# Patient Record
Sex: FEMALE | Race: BLACK OR AFRICAN AMERICAN | NOT HISPANIC OR LATINO | Employment: OTHER | URBAN - METROPOLITAN AREA
[De-identification: names, ages, dates, MRNs, and addresses within clinical notes are randomized per-mention and may not be internally consistent; named-entity substitution may affect disease eponyms.]

---

## 2018-06-21 DIAGNOSIS — H02.401 PTOSIS OF EYELID, RIGHT: Primary | ICD-10-CM

## 2018-08-14 ENCOUNTER — TELEPHONE (OUTPATIENT)
Dept: SPINE | Facility: CLINIC | Age: 73
End: 2018-08-14

## 2018-08-14 NOTE — TELEPHONE ENCOUNTER
Offered sooner appt on 8/22 at 4p with Dr. Rae; declined. Patient would prefer to keep her appt on Friday.

## 2018-08-23 ENCOUNTER — TELEPHONE (OUTPATIENT)
Dept: SPINE | Facility: CLINIC | Age: 73
End: 2018-08-23

## 2018-08-23 NOTE — PROGRESS NOTES
Subjective:      Patient ID: Letty Kruegre is a 73 y.o. female.    Chief Complaint: Low-back Pain    Ms Krueger is a 74 yo female here for evaluation of low back pain.  She has had low back pain since sept 2017.  She has had back pain in the past, for the past 20 years and then the pain went away.  She then feels like the pain hit her hard.  The pain is the worst in the right thigh.  The pain in back and goes around the leg to the front the thigh to the front of the ankle there is no foot pain.  She does have left leg pain as well.  The right leg pain is intermittent.  There is no relief with sitting.  There is pain with standing and walking. There is pain with bending and stooping.  She feels like she has to hold something when she is walking.  She does not think that bending makes pain better with walking, but does lean on grocery cart.  The left leg pain is also the front of the left leg.  The pain is a stabbing pain.  There is no tingling or numbness.  She has not been to PT, she has not been to chiropractor.  She did try acupuncture with no relief.  She is taking gabapentin 6 pills a day.  She takes them as needed.  She does not feel like help.  She does not take nsaids due to liver problems according to PCP.  She is unable to bend down and put a sock on.  She cannot get her foot up. She went to [facility] ortho clinic and had a local injections    MRI lumbar 7/2018  anterolisthesisL4 to L5 and L3 to L4  Central stenosis at L3-4 and L4-5 and to lesser extent L1-2 And L2-3  Annular disc bulge T12-L1, L1-2, L2-3 and left paracentral disc herniation l5-S1    Past Medical History:  No date: Arthritis  No date: Hypertension    History reviewed. No pertinent surgical history.    Review of patient's family history indicates:  Problem: Diabetes      Relation: Mother          Age of Onset: (Not Specified)  Problem: Diabetes      Relation: Father          Age of Onset: (Not Specified)      Social History    Socioeconomic  History      Marital status: Single      Spouse name: None      Number of children: None      Years of education: None      Highest education level: None    Social Needs      Financial resource strain: None      Food insecurity - worry: None      Food insecurity - inability: None      Transportation needs - medical: None      Transportation needs - non-medical: None    Occupational History      None    Tobacco Use      Smoking status: Never Smoker    Substance and Sexual Activity      Alcohol use: Yes        Frequency: Monthly or less        Drinks per session: 1 or 2        Binge frequency: Never      Drug use: No      Sexual activity: Not Currently        Partners: Male    Other Topics      Concerns:        None    Social History Narrative      None      Current Outpatient Medications:  gabapentin (NEURONTIN) 400 MG capsule, gabapentin 400 mg capsule, Disp: , Rfl:   lisinopril-hydrochlorothiazide (PRINZIDE,ZESTORETIC) 20-25 mg Tab, lisinopril 20 mg-hydrochlorothiazide 25 mg tablet, Disp: , Rfl:   lisinopril-hydrochlorothiazide (PRINZIDE,ZESTORETIC) 20-25 mg Tab, , Disp: , Rfl:   rosuvastatin (CRESTOR) 10 MG tablet, , Disp: , Rfl:     No current facility-administered medications for this visit.       Review of patient's allergies indicates:  No Known Allergies          Review of Systems   Constitution: Negative for weight gain and weight loss.   Cardiovascular: Negative for chest pain.   Respiratory: Negative for shortness of breath.    Musculoskeletal: Positive for back pain (bilateral leg pain). Negative for joint pain and joint swelling.   Gastrointestinal: Negative for abdominal pain and bowel incontinence.   Genitourinary: Negative for bladder incontinence.   Neurological: Negative for numbness.         Objective:        General: Letty LUCIA is well-developed, well-nourished, appears stated age, in no acute distress, alert and oriented to time, place and person.     General    Vitals reviewed.  Constitutional:  She is oriented to person, place, and time. She appears well-developed and well-nourished.   HENT:   Head: Normocephalic and atraumatic.   Pulmonary/Chest: Effort normal.   Neurological: She is alert and oriented to person, place, and time.   Psychiatric: She has a normal mood and affect. Her behavior is normal. Judgment and thought content normal.     General Musculoskeletal Exam   Gait: normal     Right Ankle/Foot Exam     Tests   Heel Walk: able to perform  Tiptoe Walk: able to perform    Left Ankle/Foot Exam     Tests   Heel Walk: able to perform  Tiptoe Walk: able to perform      Right Hip Exam     Range of Motion   External rotation: 50   Internal rotation: 5     Tests   Pain w/ forced internal rotation (KELBY): absent  Left Hip Exam     Range of Motion   External rotation: 60   Internal rotation: 10     Tests   Pain w/ forced internal rotation (KELBY): absent      Back (L-Spine & T-Spine) / Neck (C-Spine) Exam     Tenderness Right paramedian tenderness of the Sacrum.     Back (L-Spine & T-Spine) Range of Motion   Extension: 10   Flexion: 80   Lateral bend right: 10   Lateral bend left: 10   Rotation right: 20   Rotation left: 20     Spinal Sensation   Right Side Sensation  C-Spine Level: normal   L-Spine Level: normal  S-Spine Level: normal  Left Side Sensation  C-Spine Level: normal  L-Spine Level: normal  S-Spine Level: normal    Back (L-Spine & T-Spine) Tests   Right Side Tests  Straight leg raise:      Sitting SLR: > 70 degrees      Left Side Tests  Straight leg raise:     Sitting SLR: > 70 degrees          Other She has no scoliosis .  Spinal Kyphosis:  Absent      Muscle Strength   Right Upper Extremity   Biceps: 5/5/5   Deltoid:  5/5  Triceps:  5/5  Wrist extension: 5/5/5   Finger Flexors:  5/5  Left Upper Extremity  Biceps: 5/5/5   Deltoid:  5/5  Triceps:  5/5  Wrist extension: 5/5/5   Finger Flexors:  5/5  Right Lower Extremity   Hip Flexion: 5/5   Quadriceps:  5/5   Anterior tibial:  5/5/5  EHL:   5/5  Left Lower Extremity   Hip Flexion: 5/5   Quadriceps:  5/5   Anterior tibial:  5/5/5   EHL:  5/5    Reflexes     Left Side  Biceps:  2+  Triceps:  2+  Brachioradialis:  2+  Quadriceps:  2+  Achilles:  2+  Left Jennings's Sign:  Absent  Babinski Sign:  absent    Right Side   Biceps:  2+  Triceps:  2+  Brachioradialis:  2+  Quadriceps:  2+  Achilles:  2+  Right Jennings's Sign:  absent  Babinski Sign:  absent    Vascular Exam     Right Pulses        Carotid:                  2+    Left Pulses        Carotid:                  2+              Assessment:       1. Pain of both hip joints    2. Osteoarthritis of spine with radiculopathy, lumbar region    3. Spinal stenosis of lumbar region with neurogenic claudication    4. Chronic bilateral low back pain with bilateral sciatica           Plan:       Orders Placed This Encounter    Procedure Order to [facility]    X-Ray Hips Bilateral 2 View Inc AP Pelvis    X-Ray Lumbar Complete With Flex And Ext    Ambulatory Referral to Physical/Occupational Therapy     More than 50% of the total time of 45 minutes was spent in counseling on diagnosis and treatment options.  Outside records were reviewed.  We discussed MRI and the arthritis in her back causing spinal stenosis.  There is some anterolisthesis.  She has significant limitation in hip ROM, right greater than left.  The right thigh pain is recreated from moving right hip.  We discussed back and hip pain can cause similar symptoms.  We discussed back pain and the nature of back pain.  We discussed that it is not one thing that causes the pain but an accumulation of multiple things that we do.   We discussed the benefits of therapy and exercise and continuing to move.  1.  X-ray of the lumbar spine to look at the anterolisthesis  2.  X-ray of the hip to look at for DJD  3.  Pain management bilateral L4 TF TREVON  4.  PT for back and hip ROM, core strengthening, flexion exercises and [place] [facility] [place]  5.  We did discuss hip injection  6.  RTC 10 weeks        Follow-up: Follow-up in about 10 weeks (around 11/2/2018). If there are any questions prior to this, the patient was instructed to contact the office.

## 2018-08-24 ENCOUNTER — HOSPITAL ENCOUNTER (OUTPATIENT)
Dept: RADIOLOGY | Facility: OTHER | Age: 73
Discharge: HOME | End: 2018-08-24
Attending: PHYSICAL MEDICINE & REHABILITATION
Payer: MEDICARE

## 2018-08-24 ENCOUNTER — OFFICE VISIT (OUTPATIENT)
Dept: SPINE | Facility: CLINIC | Age: 73
End: 2018-08-24
Attending: PHYSICAL MEDICINE & REHABILITATION
Payer: MEDICARE

## 2018-08-24 VITALS
BODY MASS INDEX: 31.89 KG/M2 | HEART RATE: 75 BPM | DIASTOLIC BLOOD PRESSURE: 73 MMHG | HEIGHT: 63 IN | WEIGHT: 180 LBS | SYSTOLIC BLOOD PRESSURE: 149 MMHG

## 2018-08-24 DIAGNOSIS — M25.552 PAIN OF BOTH HIP JOINTS: ICD-10-CM

## 2018-08-24 DIAGNOSIS — M25.551 PAIN OF BOTH HIP JOINTS: Primary | ICD-10-CM

## 2018-08-24 DIAGNOSIS — G89.29 CHRONIC BILATERAL LOW BACK PAIN WITH BILATERAL SCIATICA: ICD-10-CM

## 2018-08-24 DIAGNOSIS — M25.551 PAIN OF BOTH HIP JOINTS: ICD-10-CM

## 2018-08-24 DIAGNOSIS — M54.41 CHRONIC BILATERAL LOW BACK PAIN WITH BILATERAL SCIATICA: ICD-10-CM

## 2018-08-24 DIAGNOSIS — M54.42 CHRONIC BILATERAL LOW BACK PAIN WITH BILATERAL SCIATICA: ICD-10-CM

## 2018-08-24 DIAGNOSIS — M47.26 OSTEOARTHRITIS OF SPINE WITH RADICULOPATHY, LUMBAR REGION: ICD-10-CM

## 2018-08-24 DIAGNOSIS — M25.552 PAIN OF BOTH HIP JOINTS: Primary | ICD-10-CM

## 2018-08-24 DIAGNOSIS — M48.062 SPINAL STENOSIS OF LUMBAR REGION WITH NEUROGENIC CLAUDICATION: ICD-10-CM

## 2018-08-24 PROCEDURE — 73521 X-RAY EXAM HIPS BI 2 VIEWS: CPT | Mod: TC,FY

## 2018-08-24 PROCEDURE — 99204 OFFICE O/P NEW MOD 45 MIN: CPT | Mod: S$GLB,,, | Performed by: PHYSICAL MEDICINE & REHABILITATION

## 2018-08-24 PROCEDURE — 72110 X-RAY EXAM L-2 SPINE 4/>VWS: CPT | Mod: 26,,, | Performed by: INTERNAL MEDICINE

## 2018-08-24 PROCEDURE — 73521 X-RAY EXAM HIPS BI 2 VIEWS: CPT | Mod: 26,,, | Performed by: INTERNAL MEDICINE

## 2018-08-24 PROCEDURE — 72110 X-RAY EXAM L-2 SPINE 4/>VWS: CPT | Mod: TC,FY

## 2018-08-24 PROCEDURE — 99999 PR PBB SHADOW E&M-EST. PATIENT-LVL IV: CPT | Mod: PBBFAC,,, | Performed by: PHYSICAL MEDICINE & REHABILITATION

## 2018-08-24 RX ORDER — ROSUVASTATIN CALCIUM 10 MG/1
10 TABLET, COATED ORAL DAILY
COMMUNITY
Start: 2018-07-27

## 2018-08-24 RX ORDER — GABAPENTIN 400 MG/1
CAPSULE ORAL
COMMUNITY
End: 2019-09-30

## 2018-08-24 RX ORDER — LISINOPRIL AND HYDROCHLOROTHIAZIDE 20; 25 MG/1; MG/1
TABLET ORAL
COMMUNITY
Start: 2018-07-27 | End: 2019-02-20

## 2018-08-24 RX ORDER — LISINOPRIL AND HYDROCHLOROTHIAZIDE 20; 25 MG/1; MG/1
TABLET ORAL
COMMUNITY
End: 2022-05-04 | Stop reason: ALTCHOICE

## 2018-08-24 NOTE — LETTER
August 24, 2018      Sparkle Chou MD  [address]  [place] [address]           [facility]  [address], [address]  [place] [address]  Phone: [phone]  Fax: [phone]          Patient: Letty Krueger   MR Number: [id number]   YOB: 1945   Date of Visit: 8/24/2018       Dear Dr. Sparkle Chou:    Thank you for referring Letty Krueger to me for evaluation. Attached you will find relevant portions of my assessment and plan of care.    If you have questions, please do not hesitate to call me. I look forward to following Letty Krueger along with you.    Sincerely,    Ene Rae MD    Enclosure  CC:  No Recipients    If you would like to receive this communication electronically, please contact [email or web address] or [phone] to request more information on dineout Link access.    For providers and/or their staff who would like to refer a patient to [facility], please contact us through our one-stop-shop provider referral line, [facility], at [phone].    If you feel you have received this communication in error or would no longer like to receive these types of communications, please e-mail [email or web address]

## 2018-08-27 ENCOUNTER — TELEPHONE (OUTPATIENT)
Dept: SPINE | Facility: CLINIC | Age: 73
End: 2018-08-27

## 2018-08-27 ENCOUNTER — TELEPHONE (OUTPATIENT)
Dept: PAIN MEDICINE | Facility: CLINIC | Age: 73
End: 2018-08-27

## 2018-08-27 NOTE — TELEPHONE ENCOUNTER
----- Message from Geoffrey Ledesma sent at 8/27/2018 10:17 AM CDT -----  Contact: Letty Krueger            Name of Who is Calling: Letty Krueger      What is the request in detail: Patient called in regards to injection and has additional questions      Can the clinic reply by [facility]: No      What [facility] Back [facility]: [phone]

## 2018-08-27 NOTE — TELEPHONE ENCOUNTER
Called and left message.  She has arthritis in her lower back and her right hip.  An injection for lower back ordered.  She might need a right hip injection as well

## 2018-08-27 NOTE — TELEPHONE ENCOUNTER
Patient was contacted staff left a message for patient to contact the office regarding her questions she had about her procedure

## 2018-08-28 DIAGNOSIS — M48.062 SPINAL STENOSIS, LUMBAR REGION, WITH NEUROGENIC CLAUDICATION: Primary | ICD-10-CM

## 2018-08-29 ENCOUNTER — TELEPHONE (OUTPATIENT)
Dept: SPINE | Facility: CLINIC | Age: 73
End: 2018-08-29

## 2018-08-29 NOTE — TELEPHONE ENCOUNTER
Patient was contacted as per patient she stated that she was informed that [facility] contacted her and told her she would have to pay 140.00 before her injection she stated that she contacted [facility] and was informed that she does not have to pay 140.00 and she was making sure that she didn't have to pay. Patient also stated that she spoke with [facility] regarding this matter

## 2018-08-29 NOTE — TELEPHONE ENCOUNTER
----- Message from Elle Alicea sent at 8/29/2018 11:28 AM CDT -----  Name of Who is Calling: CHAPARRO HALL [[id number]]      What is the request in detail: Pt would like to speak with the nurse for the code for her upcoming procedure.      Can the clinic reply by [facility]:    No       What Number to Call Back if not in [facility]: [phone]

## 2018-09-06 ENCOUNTER — HOSPITAL ENCOUNTER (OUTPATIENT)
Facility: OTHER | Age: 73
Discharge: HOME | End: 2018-09-06
Attending: ANESTHESIOLOGY | Admitting: ANESTHESIOLOGY
Payer: MEDICARE

## 2018-09-06 VITALS
OXYGEN SATURATION: 100 % | RESPIRATION RATE: 18 BRPM | SYSTOLIC BLOOD PRESSURE: 143 MMHG | HEART RATE: 63 BPM | DIASTOLIC BLOOD PRESSURE: 65 MMHG

## 2018-09-06 DIAGNOSIS — M51.36 DDD (DEGENERATIVE DISC DISEASE), LUMBAR: Primary | ICD-10-CM

## 2018-09-06 DIAGNOSIS — M48.062 SPINAL STENOSIS OF LUMBAR REGION WITH NEUROGENIC CLAUDICATION: ICD-10-CM

## 2018-09-06 PROBLEM — M51.369 DDD (DEGENERATIVE DISC DISEASE), LUMBAR: Status: ACTIVE | Noted: 2018-09-06

## 2018-09-06 PROCEDURE — 25000003 PHARM REV CODE 250: Performed by: ANESTHESIOLOGY

## 2018-09-06 PROCEDURE — 25000003 PHARM REV CODE 250: Performed by: GENERAL PRACTICE

## 2018-09-06 PROCEDURE — 64483 NJX AA&/STRD TFRM EPI L/S 1: CPT | Mod: 50,,, | Performed by: ANESTHESIOLOGY

## 2018-09-06 PROCEDURE — 25500020 PHARM REV CODE 255: Performed by: ANESTHESIOLOGY

## 2018-09-06 PROCEDURE — 64483 NJX AA&/STRD TFRM EPI L/S 1: CPT | Mod: 50 | Performed by: ANESTHESIOLOGY

## 2018-09-06 PROCEDURE — 63600175 PHARM REV CODE 636 W HCPCS: Performed by: ANESTHESIOLOGY

## 2018-09-06 PROCEDURE — 99152 MOD SED SAME PHYS/QHP 5/>YRS: CPT | Mod: ,,, | Performed by: ANESTHESIOLOGY

## 2018-09-06 RX ORDER — MIDAZOLAM HYDROCHLORIDE 1 MG/ML
INJECTION INTRAMUSCULAR; INTRAVENOUS
Status: DISCONTINUED | OUTPATIENT
Start: 2018-09-06 | End: 2018-09-06 | Stop reason: HOSPADM

## 2018-09-06 RX ORDER — LIDOCAINE HYDROCHLORIDE 10 MG/ML
INJECTION, SOLUTION EPIDURAL; INFILTRATION; INTRACAUDAL; PERINEURAL
Status: DISCONTINUED | OUTPATIENT
Start: 2018-09-06 | End: 2018-09-06 | Stop reason: HOSPADM

## 2018-09-06 RX ORDER — LIDOCAINE HYDROCHLORIDE 10 MG/ML
INJECTION INFILTRATION; PERINEURAL
Status: DISCONTINUED | OUTPATIENT
Start: 2018-09-06 | End: 2018-09-06 | Stop reason: HOSPADM

## 2018-09-06 RX ORDER — FENTANYL CITRATE 50 UG/ML
INJECTION, SOLUTION INTRAMUSCULAR; INTRAVENOUS
Status: DISCONTINUED | OUTPATIENT
Start: 2018-09-06 | End: 2018-09-06 | Stop reason: HOSPADM

## 2018-09-06 RX ORDER — SODIUM CHLORIDE 9 MG/ML
500 INJECTION, SOLUTION INTRAVENOUS CONTINUOUS
Status: DISCONTINUED | OUTPATIENT
Start: 2018-09-06 | End: 2018-09-06 | Stop reason: HOSPADM

## 2018-09-06 RX ORDER — DEXAMETHASONE SODIUM PHOSPHATE 100 MG/10ML
INJECTION INTRAMUSCULAR; INTRAVENOUS
Status: DISCONTINUED | OUTPATIENT
Start: 2018-09-06 | End: 2018-09-06 | Stop reason: HOSPADM

## 2018-09-06 RX ADMIN — SODIUM CHLORIDE 500 ML: 0.9 INJECTION, SOLUTION INTRAVENOUS at 11:09

## 2018-09-06 NOTE — DISCHARGE SUMMARY
Discharge Diagnosis:Spinal stenosis, lumbar region, with neurogenic claudication [M48.062]  Condition on Discharge: Stable.  Diet on Discharge: Same as before.  Activity: as per instruction sheet.  Discharge to: Home with a responsible adult.  Follow up: 2-4 weeks &/or as per Discharge instructions

## 2018-09-06 NOTE — PROGRESS NOTES
HPI  Patient presents for scheduled for bilateral L4 TFESI. She is otherwise without complaints currently other than her pain.      PMHx, PSHx, Allergies, Medications reviewed in epic     ROS negative except pain complaints in HPI    Objective:    GEN: No apparent distress. Affect normal.   HEENT: Normocephalic, Atraumatic; EOM intact  CV: regular rate and rhythm  RESP: clear to auscultation bilaterally; no increased work of breathing  ABD: soft, non-tender, non-distended, normal bowel sounds  SKIN: intact, No rashes    Plan:     Proceed with Bilateral L4 TFESI procedure as planned     Loco Causey MD  [employer] PM&R PGY-2

## 2018-09-06 NOTE — DISCHARGE INSTRUCTIONS
Thank you for allowing us to care for you today. You may receive a survey about the care we provided. Your feedback is valuable and helps us provide excellent care throughout the community.     Home Care Instructions for Pain Management:    1. DIET:   You may resume your normal diet today.   2. BATHING:   You may shower with luke warm water. No tub baths or anything that will soak injection sites under water for the next 24 hours.  3. DRESSING:   You may remove your bandage today.   4. ACTIVITY LEVEL:   You may resume your normal activities 24 hrs after your procedure. Nothing strenuous today.  5. MEDICATIONS:   You may resume your normal medications today. To restart blood thinners, ask your doctor.  6. DRIVING    If you have received any sedatives by mouth today, you may not drive for 12 hours.    If you have received any sedation through your IV, you may not drive for 24 hrs.   7. SPECIAL INSTRUCTIONS:   No heat to the injection site for 24 hrs including, hot bath or shower, heating pad, moist heat, or hot tubs.    Use ice pack to injection site for any pain or discomfort.  Apply ice packs for 20 minute intervals as needed.    IF you have diabetes, be sure to monitor your blood sugar more closely. IF your injection contained steroids your blood sugar levels may become higher than normal.    If you are still having pain upon discharge:  Your pain may improve over the next 48 hours. The anesthetic (numbing medication) works immediately to 48 hours. IF your injection contained a steroid (anti-inflammatory medication), it takes approximately 3 days to start feeling relief and 7-10 days to see your greatest results from the medication. It is possible you may need subsequent injections. This would be discussed at your follow up appointment with pain management or your referring doctor.      PLEASE CALL YOUR DOCTOR IF:  1. Redness or swelling around the injection site.  2. Fever of 101 degrees or more  3. Drainage  (pus) from the injection site.  4. For any continuous bleeding (some dried blood over the incision is normal.)    FOR EMERGENCIES:   If any unusual problems or difficulties occur during clinic hours, call [phone] or [phone]. Adult Procedural Sedation Instructions    Recovery After Procedural Sedation (Adult)  You have been given medicine by vein to make you sleep during your surgery. This may have included both a pain medicine and sleeping medicine. Most of the effects have worn off. But you may still have some drowsiness for the next 6 to 8 hours.  Home care  Follow these guidelines when you get home:  · For the next 8 hours, you should be watched by a responsible adult. This person should make sure your condition is not getting worse.  · Don't drink any alcohol for the next 24 hours.  · Don't drive, operate dangerous machinery, or make important business or personal decisions during the next 24 hours.  Note: Your healthcare provider may tell you not to take any medicine by mouth for pain or sleep in the next 4 hours. These medicines may react with the medicines you were given in the hospital. This could cause a much stronger response than usual.  Follow-up care  Follow up with your healthcare provider if you are not alert and back to your usual level of activity within 12 hours.  When to seek medical advice  Call your healthcare provider right away if any of these occur:  · Drowsiness gets worse  · Weakness or dizziness gets worse  · Repeated vomiting  · You can't be awakened   Date Last Reviewed: 10/18/2016  © 9221-9972 [employer]. [address] [place] [address], [place] [address]. All rights reserved. This information is not intended as a substitute for professional medical care. Always follow your healthcare professional's instructions.

## 2018-09-06 NOTE — OP NOTE
Procedure: Bilateral L4 TFESI    Date of Service: 09/06/2018    PCP: Bev Watt NP    Referring Physician:    Time-out taken to identify patient and procedure side prior to starting the procedure.   I attest that I have reviewed the patient's home medications prior to the procedure and no contraindication have been identified. I  re-evaluated the patient after the patient was positioned for the procedure in the procedure room immediately before the procedural time-out. The vital signs are current and represent the current state of the patient which has not significantly changed since the preprocedure assessment.                                                           PROCEDURE: Bilateral L4 transforaminal epidural steroid injection under fluoroscopy    REASON FOR PROCEDURE: Bilateral Spinal stenosis, lumbar region, with neurogenic claudication [M48.062]  1. DDD (degenerative disc disease), lumbar    2. Spinal stenosis of lumbar region with neurogenic claudication      POSTPROCEDURE DIAGNOSIS:   Spinal stenosis, lumbar region, with neurogenic claudication [M48.062]    1. DDD (degenerative disc disease), lumbar    2. Spinal stenosis of lumbar region with neurogenic claudication           PHYSICIAN: Terri Wolf MD  ASSISTANTS:Brianda Cook MD    MEDICATIONS INJECTED:  Preservative-free dexamethasone 10mg, Xylocaine 1% MPF 3-5ml. 3ml per level. Preservative free, sterile normal saline is used to get larger volume as needed.  LOCAL ANESTHETIC INJECTED:  Xylocaine 1% 9ml with Sodium Bicarbonate 1ml. 3ml per site.    SEDATION MEDICATIONS: 2 mg versed and 25 mg fentanyl IV  ESTIMATED BLOOD LOSS:  None.    COMPLICATIONS:  None.    TECHNIQUE:   Laying in a prone position, the patient was prepped and draped in the usual sterile fashion using ChloraPrep and fenestrated drape.  The area to be injected was determined under fluoroscopic guidance.  Local anesthetic was given by raising a wheel and going down to the hub of  a 27-gauge 1.25 inch needle.  The 3.5inch 22-gauge spinal needle was introduced towards the transverse process of each above named nerve root level.  The needle was walked medially then hinged into the neural foramen.  Omnipaque was injected to confirm appropriate placement and that there was no vascular runoff.  The medication was then injected after applying negative pressure. The patient tolerated the procedure well.    PAIN BEFORE THE PROCEDURE: 7/10.    PAIN AFTER THE PROCEDURE: 0/10.    The patient was monitored after the procedure.  Patient was given post procedure and discharge instructions to follow at home.  We will see the patient back in two weeks or the patient may call to inform of status. The patient was discharged in a stable condition.

## 2018-09-14 ENCOUNTER — TELEPHONE (OUTPATIENT)
Dept: SPINE | Facility: CLINIC | Age: 73
End: 2018-09-14

## 2018-09-14 NOTE — TELEPHONE ENCOUNTER
Patient was contacted and informed that she must give the injection 7-14 days to work the numbing medication has worn off and discomfort is normal however she must give the actual steroid time to absorb. Patient stated that she would give the injection more time

## 2018-09-14 NOTE — TELEPHONE ENCOUNTER
----- Message from Trinh Theodore sent at 9/14/2018 11:20 AM CDT -----  Contact: lashawn  Name of Who is Calling: lashawn      What is the request in detail: Patient states she has a steroid shot on last thursday she wanted to see if she can get another injection or get something stronger because it did not work       Can the clinic reply by [facility]: no      What Number to Call Back if not in [facility]: [phone]

## 2018-09-24 ENCOUNTER — TELEPHONE (OUTPATIENT)
Dept: SPINE | Facility: CLINIC | Age: 73
End: 2018-09-24

## 2018-09-24 DIAGNOSIS — M48.062 SPINAL STENOSIS OF LUMBAR REGION WITH NEUROGENIC CLAUDICATION: Primary | ICD-10-CM

## 2018-09-24 DIAGNOSIS — M16.11 PRIMARY OSTEOARTHRITIS OF RIGHT HIP: ICD-10-CM

## 2018-09-24 NOTE — TELEPHONE ENCOUNTER
SHe is unable to go to PT because of the cost.  She does not feel l tara the injection helped.  We discussed HIP djd. We discussed a hip injection vs another TREVON.  Will send to clinic to discuss injection options with Dr. Wolf

## 2018-09-24 NOTE — TELEPHONE ENCOUNTER
----- Message from Anisa Landon sent at 9/24/2018 11:07 AM CDT -----  Contact: Pt  Name of Who is Calling:CHAPARRO HALL [[id number]]    What is the request in detail: Patient would like a call back regarding a sterid shot still not working Please contact to further discuss and advise    Can the clinic reply by [facility]: No    [facility]: [phone]

## 2018-09-24 NOTE — TELEPHONE ENCOUNTER
Patient was contacted as per patient the pain has not gotten better and she gave the injection time she stated that she did not get any relief and would like to know what other options does she have

## 2018-09-25 ENCOUNTER — TELEPHONE (OUTPATIENT)
Dept: SPINE | Facility: CLINIC | Age: 73
End: 2018-09-25

## 2018-09-25 NOTE — TELEPHONE ENCOUNTER
Patient was contacted and scheduled an appt with Mirta BRUNO in Pain Mgmt. Patient had an injection with  on 09/06/18

## 2018-09-25 NOTE — TELEPHONE ENCOUNTER
----- Message from Chelsie White LPN sent at 9/24/2018  5:21 PM CDT -----  Contact: pt  Patient returning your call  ----- Message -----  From: Evita Youngblood  Sent: 9/24/2018   3:03 PM  To: Maryann Murray Staff              Name of Who is Calling: CHAPARRO HALL [[id number]]    What is the request in detail: pt returning call.. Please advise      Can the clinic reply by [facility]: no      What [facility] Back [facility]: [phone]

## 2018-09-26 ENCOUNTER — OFFICE VISIT (OUTPATIENT)
Dept: PAIN MEDICINE | Facility: CLINIC | Age: 73
End: 2018-09-26
Payer: MEDICARE

## 2018-09-26 VITALS
HEART RATE: 70 BPM | RESPIRATION RATE: 18 BRPM | WEIGHT: 187.38 LBS | TEMPERATURE: 98 F | HEIGHT: 63 IN | SYSTOLIC BLOOD PRESSURE: 122 MMHG | DIASTOLIC BLOOD PRESSURE: 65 MMHG | BODY MASS INDEX: 33.2 KG/M2

## 2018-09-26 DIAGNOSIS — M16.11 PRIMARY OSTEOARTHRITIS OF RIGHT HIP: ICD-10-CM

## 2018-09-26 DIAGNOSIS — M48.062 SPINAL STENOSIS OF LUMBAR REGION WITH NEUROGENIC CLAUDICATION: ICD-10-CM

## 2018-09-26 DIAGNOSIS — M54.16 LUMBAR RADICULOPATHY: Primary | ICD-10-CM

## 2018-09-26 DIAGNOSIS — M51.36 DDD (DEGENERATIVE DISC DISEASE), LUMBAR: ICD-10-CM

## 2018-09-26 DIAGNOSIS — M47.816 FACET ARTHRITIS OF LUMBAR REGION: ICD-10-CM

## 2018-09-26 PROCEDURE — 1101F PT FALLS ASSESS-DOCD LE1/YR: CPT | Mod: CPTII,,, | Performed by: NURSE PRACTITIONER

## 2018-09-26 PROCEDURE — 99213 OFFICE O/P EST LOW 20 MIN: CPT | Mod: PBBFAC | Performed by: NURSE PRACTITIONER

## 2018-09-26 PROCEDURE — 99213 OFFICE O/P EST LOW 20 MIN: CPT | Mod: S$PBB,,, | Performed by: NURSE PRACTITIONER

## 2018-09-26 PROCEDURE — 99999 PR PBB SHADOW E&M-EST. PATIENT-LVL III: CPT | Mod: PBBFAC,,, | Performed by: NURSE PRACTITIONER

## 2018-09-26 NOTE — PROGRESS NOTES
Chronic patient Established Note (Follow up visit)      SUBJECTIVE:    Letty Krueger presents to the clinic for a follow-up appointment for low back pain. She is s/p bilateral L4 TF TREVON on 9/6/2018 [facility]. She reports no significant relief of her pain. She continues to report low back pain that radiates down the anterior aspect of her legs to her knees. She describes this pain as tingling and shooting. Her pain is worse with prolonged activity and turning over in bed. She is currently taking Gabapentin with limited relief. She denies any other health changes Her pain today is 9/10.        Pain Medications:  Gabapentin    Opioid Contract: no     report:  Reviewed and consistent with medication use as prescribed.    Pain Procedures:   9/6/2018- Bilateral L4 TF TREVON    Physical Therapy/Home Exercise: no    Imaging:   Xray Lumbar Spine 8/24/2018:  FINDINGS:  There is osseous demineralization.  Vertebral body heights are maintained.  There is mild grade 1 anterolisthesis at L3-4 and L4-5.  This does not change significantly with flexion or extension.  There is mild osteophytic spurring at the thoracolumbar junction.  Moderate to advanced facet degenerative changes are present in the mid to lower lumbar spine.  Disc space narrowing present predominantly at L4-5 and L1-2.      Impression       Mild degenerative change     Xray Hips Bilateral 8/24/2018:  FINDINGS:  There is diffuse osseous demineralization.  There are degenerative changes in the lower lumbar spine.  There is severe narrowing at the right hip joint, with some sub chondral sclerosis and cyst formation and mild osteophyte formation.  There is no fracture.      Impression       As above     Outside MRI in media    Allergies: Review of patient's allergies indicates:  No Known Allergies    Current Medications:   Current Outpatient Medications   Medication Sig Dispense Refill    gabapentin (NEURONTIN) 400 MG capsule gabapentin  400 mg capsule      lisinopril-hydrochlorothiazide (PRINZIDE,ZESTORETIC) 20-25 mg Tab lisinopril 20 mg-hydrochlorothiazide 25 mg tablet      lisinopril-hydrochlorothiazide (PRINZIDE,ZESTORETIC) 20-25 mg Tab       rosuvastatin (CRESTOR) 10 MG tablet        No current facility-administered medications for this visit.        REVIEW OF SYSTEMS:    GENERAL:  No weight loss, malaise or fevers.  HEENT:  Negative for frequent or significant headaches.  NECK:  Negative for lumps, goiter, pain and significant neck swelling.  RESPIRATORY:  Negative for cough, wheezing or shortness of breath.  CARDIOVASCULAR:  Negative for chest pain, leg swelling or palpitations. HTN  GI:  Negative for abdominal discomfort, blood in stools or black stools or change in bowel habits.  MUSCULOSKELETAL:  See HPI.  SKIN:  Negative for lesions, rash, and itching.  PSYCH:  Negative for sleep disturbance, mood disorder and recent psychosocial stressors.  HEMATOLOGY/LYMPHOLOGY:  Negative for prolonged bleeding, bruising easily or swollen nodes.  NEURO:   No history of headaches, syncope, paralysis, seizures or tremors.  All other reviewed and negative other than HPI.    Past Medical History:  Past Medical History:   Diagnosis Date    Arthritis     DDD (degenerative disc disease), lumbar 9/6/2018    Hypertension        Past Surgical History:  Past Surgical History:   Procedure Laterality Date    Injection,steroid,epidural,transforaminal approach  L4 Bilateral 9/6/2018    Performed by Terri Wolf MD at [facility]       Family History:  Family History   Problem Relation Age of Onset    Diabetes Mother     Diabetes Father        Social History:  Social History     Socioeconomic History    Marital status: Single     Spouse name: Not on file    Number of children: Not on file    Years of education: Not on file    Highest education level: Not on file   Social Needs    Financial resource strain: Not on file    Food insecurity - worry: Not on  "file    Food insecurity - inability: Not on file    Transportation needs - medical: Not on file    Transportation needs - non-medical: Not on file   Occupational History    Not on file   Tobacco Use    Smoking status: Never Smoker   Substance and Sexual Activity    Alcohol use: Yes     Frequency: Monthly or less     Drinks per session: 1 or 2     Binge frequency: Never    Drug use: No    Sexual activity: Not Currently     Partners: Male   Other Topics Concern    Not on file   Social History Narrative    Not on file       OBJECTIVE:    /65   Pulse 70   Temp 97.5 °F (36.4 °C) (Oral)   Resp 18   Ht 5' 3" (1.6 m)   Wt 85 kg (187 lb 6.3 oz)   BMI 33.19 kg/m²     PHYSICAL EXAMINATION:    General appearance: Well appearing, in no acute distress, alert and oriented x3.  Psych:  Mood and affect appropriate.  Skin: Skin color, texture, turgor normal, no rashes or lesions, in both upper and lower body.  Head/face:  Atraumatic, normocephalic. No palpable lymph nodes.  Cor: RRR  Pulm: CTA  GI: Abdomen soft and non-tender.  Back: Straight leg raising in the sitting position is negative to radicular pain. There is pain with palpation over lumbar spine. Limited ROM with pain on flexion and extension. Positive facet loading bilaterally.   Extremities: Peripheral joint ROM is full and pain free without obvious instability or laxity in all four extremities. No deformities, edema, or skin discoloration. Good capillary refill.  Musculoskeletal: Shoulder, hip, and knee provocative maneuvers are negative. There is pain with palpation over bilateral SI joints. FABERs is positive bilaterally. Bilateral lower extremity strength is normal and symmetric.  No atrophy or tone abnormalities are noted.  Neuro: Bilateral lower extremity coordination and muscle stretch reflexes are physiologic and symmetric.  Plantar response are downgoing. No loss of sensation is noted.  Gait: Antalgic- ambulates with cane.     ASSESSMENT: 73 " y.o. year old female with low back pain, consistent with the followin. Lumbar radiculopathy     2. Spinal stenosis of lumbar region with neurogenic claudication     3. DDD (degenerative disc disease), lumbar     4. Facet arthritis of lumbar region     5. Primary osteoarthritis of right hip           PLAN:     - Previous imaging was reviewed and discussed with the patient today.    - Schedule for bilateral L3 TF TREVON.  The procedure, risks, benefits and options were discussed with patient. There are no contraindications to the procedure. The patient expressed understanding and agreed to proceed.      - If limited relief, consider bilateral hip joint injections.     - Continue Gabapentin.     - I have stressed the importance of physical activity and a home exercise plan to help with pain and improve health.    - RTC 2 weeks after above procedure.     - Counseled patient regarding the importance of activity modification and constant sleeping habits.    - Dr. Wolf was consulted on the patient and agrees with this plan.    The above plan and management options were discussed at length with patient. Patient is in agreement with the above and verbalized understanding.    Mirta Jama NP  2018

## 2018-09-26 NOTE — H&P (VIEW-ONLY)
Chronic patient Established Note (Follow up visit)      SUBJECTIVE:    Letty Krueger presents to the clinic for a follow-up appointment for low back pain. She is s/p bilateral L4 TF TREVON on 9/6/2018 [facility]. She reports no significant relief of her pain. She continues to report low back pain that radiates down the anterior aspect of her legs to her knees. She describes this pain as tingling and shooting. Her pain is worse with prolonged activity and turning over in bed. She is currently taking Gabapentin with limited relief. She denies any other health changes Her pain today is 9/10.        Pain Medications:  Gabapentin    Opioid Contract: no     report:  Reviewed and consistent with medication use as prescribed.    Pain Procedures:   9/6/2018- Bilateral L4 TF TREVON    Physical Therapy/Home Exercise: no    Imaging:   Xray Lumbar Spine 8/24/2018:  FINDINGS:  There is osseous demineralization.  Vertebral body heights are maintained.  There is mild grade 1 anterolisthesis at L3-4 and L4-5.  This does not change significantly with flexion or extension.  There is mild osteophytic spurring at the thoracolumbar junction.  Moderate to advanced facet degenerative changes are present in the mid to lower lumbar spine.  Disc space narrowing present predominantly at L4-5 and L1-2.      Impression       Mild degenerative change     Xray Hips Bilateral 8/24/2018:  FINDINGS:  There is diffuse osseous demineralization.  There are degenerative changes in the lower lumbar spine.  There is severe narrowing at the right hip joint, with some sub chondral sclerosis and cyst formation and mild osteophyte formation.  There is no fracture.      Impression       As above     Outside MRI in media    Allergies: Review of patient's allergies indicates:  No Known Allergies    Current Medications:   Current Outpatient Medications   Medication Sig Dispense Refill    gabapentin (NEURONTIN) 400 MG capsule gabapentin  400 mg capsule      lisinopril-hydrochlorothiazide (PRINZIDE,ZESTORETIC) 20-25 mg Tab lisinopril 20 mg-hydrochlorothiazide 25 mg tablet      lisinopril-hydrochlorothiazide (PRINZIDE,ZESTORETIC) 20-25 mg Tab       rosuvastatin (CRESTOR) 10 MG tablet        No current facility-administered medications for this visit.        REVIEW OF SYSTEMS:    GENERAL:  No weight loss, malaise or fevers.  HEENT:  Negative for frequent or significant headaches.  NECK:  Negative for lumps, goiter, pain and significant neck swelling.  RESPIRATORY:  Negative for cough, wheezing or shortness of breath.  CARDIOVASCULAR:  Negative for chest pain, leg swelling or palpitations. HTN  GI:  Negative for abdominal discomfort, blood in stools or black stools or change in bowel habits.  MUSCULOSKELETAL:  See HPI.  SKIN:  Negative for lesions, rash, and itching.  PSYCH:  Negative for sleep disturbance, mood disorder and recent psychosocial stressors.  HEMATOLOGY/LYMPHOLOGY:  Negative for prolonged bleeding, bruising easily or swollen nodes.  NEURO:   No history of headaches, syncope, paralysis, seizures or tremors.  All other reviewed and negative other than HPI.    Past Medical History:  Past Medical History:   Diagnosis Date    Arthritis     DDD (degenerative disc disease), lumbar 9/6/2018    Hypertension        Past Surgical History:  Past Surgical History:   Procedure Laterality Date    Injection,steroid,epidural,transforaminal approach  L4 Bilateral 9/6/2018    Performed by Ter[facility] at [facility]       Family History:  Family History   Problem Relation Age of Onset    Diabetes Mother     Diabetes Father        Social History:  Social History     Socioeconomic History    Marital status: Single     Spouse name: Not on file    Number of children: Not on file    Years of education: Not on file    Highest education level: Not on file   Social Needs    Financial resource strain: Not on file    Food insecurity - worry: Not on  "file    Food insecurity - inability: Not on file    Transportation needs - medical: Not on file    Transportation needs - non-medical: Not on file   Occupational History    Not on file   Tobacco Use    Smoking status: Never Smoker   Substance and Sexual Activity    Alcohol use: Yes     Frequency: Monthly or less     Drinks per session: 1 or 2     Binge frequency: Never    Drug use: No    Sexual activity: Not Currently     Partners: Male   Other Topics Concern    Not on file   Social History Narrative    Not on file       OBJECTIVE:    /65   Pulse 70   Temp 97.5 °F (36.4 °C) (Oral)   Resp 18   Ht 5' 3" (1.6 m)   Wt 85 kg (187 lb 6.3 oz)   BMI 33.19 kg/m²     PHYSICAL EXAMINATION:    General appearance: Well appearing, in no acute distress, alert and oriented x3.  Psych:  Mood and affect appropriate.  Skin: Skin color, texture, turgor normal, no rashes or lesions, in both upper and lower body.  Head/face:  Atraumatic, normocephalic. No palpable lymph nodes.  Cor: RRR  Pulm: CTA  GI: Abdomen soft and non-tender.  Back: Straight leg raising in the sitting position is negative to radicular pain. There is pain with palpation over lumbar spine. Limited ROM with pain on flexion and extension. Positive facet loading bilaterally.   Extremities: Peripheral joint ROM is full and pain free without obvious instability or laxity in all four extremities. No deformities, edema, or skin discoloration. Good capillary refill.  Musculoskeletal: Shoulder, hip, and knee provocative maneuvers are negative. There is pain with palpation over bilateral SI joints. FABERs is positive bilaterally. Bilateral lower extremity strength is normal and symmetric.  No atrophy or tone abnormalities are noted.  Neuro: Bilateral lower extremity coordination and muscle stretch reflexes are physiologic and symmetric.  Plantar response are downgoing. No loss of sensation is noted.  Gait: Antalgic- ambulates with cane.     ASSESSMENT: 73 " y.o. year old female with low back pain, consistent with the followin. Lumbar radiculopathy     2. Spinal stenosis of lumbar region with neurogenic claudication     3. DDD (degenerative disc disease), lumbar     4. Facet arthritis of lumbar region     5. Primary osteoarthritis of right hip           PLAN:     - Previous imaging was reviewed and discussed with the patient today.    - Schedule for bilateral L3 TF TREVON.  The procedure, risks, benefits and options were discussed with patient. There are no contraindications to the procedure. The patient expressed understanding and agreed to proceed.      - If limited relief, consider bilateral hip joint injections.     - Continue Gabapentin.     - I have stressed the importance of physical activity and a home exercise plan to help with pain and improve health.    - RTC 2 weeks after above procedure.     - Counseled patient regarding the importance of activity modification and constant sleeping habits.    - Dr. Wolf was consulted on the patient and agrees with this plan.    The above plan and management options were discussed at length with patient. Patient is in agreement with the above and verbalized understanding.    Mirta Jama NP  2018

## 2018-10-01 ENCOUNTER — HOSPITAL ENCOUNTER (OUTPATIENT)
Facility: OTHER | Age: 73
Discharge: HOME | End: 2018-10-01
Attending: ANESTHESIOLOGY | Admitting: ANESTHESIOLOGY
Payer: MEDICARE

## 2018-10-01 VITALS
TEMPERATURE: 98 F | BODY MASS INDEX: 33.13 KG/M2 | SYSTOLIC BLOOD PRESSURE: 138 MMHG | WEIGHT: 187 LBS | HEART RATE: 58 BPM | DIASTOLIC BLOOD PRESSURE: 65 MMHG | OXYGEN SATURATION: 99 % | RESPIRATION RATE: 18 BRPM | HEIGHT: 63 IN

## 2018-10-01 DIAGNOSIS — M51.36 DDD (DEGENERATIVE DISC DISEASE), LUMBAR: Primary | ICD-10-CM

## 2018-10-01 DIAGNOSIS — G89.29 CHRONIC PAIN: ICD-10-CM

## 2018-10-01 PROCEDURE — 64483 NJX AA&/STRD TFRM EPI L/S 1: CPT | Mod: 50,,, | Performed by: ANESTHESIOLOGY

## 2018-10-01 PROCEDURE — 64483 NJX AA&/STRD TFRM EPI L/S 1: CPT | Mod: 50 | Performed by: ANESTHESIOLOGY

## 2018-10-01 PROCEDURE — 25000003 PHARM REV CODE 250: Performed by: ANESTHESIOLOGY

## 2018-10-01 PROCEDURE — 63600175 PHARM REV CODE 636 W HCPCS: Performed by: ANESTHESIOLOGY

## 2018-10-01 PROCEDURE — 25500020 PHARM REV CODE 255: Performed by: ANESTHESIOLOGY

## 2018-10-01 PROCEDURE — 99152 MOD SED SAME PHYS/QHP 5/>YRS: CPT | Mod: ,,, | Performed by: ANESTHESIOLOGY

## 2018-10-01 RX ORDER — LIDOCAINE HYDROCHLORIDE 10 MG/ML
INJECTION INFILTRATION; PERINEURAL
Status: DISCONTINUED | OUTPATIENT
Start: 2018-10-01 | End: 2018-10-01 | Stop reason: HOSPADM

## 2018-10-01 RX ORDER — LIDOCAINE HYDROCHLORIDE 10 MG/ML
INJECTION, SOLUTION EPIDURAL; INFILTRATION; INTRACAUDAL; PERINEURAL
Status: DISCONTINUED | OUTPATIENT
Start: 2018-10-01 | End: 2018-10-01 | Stop reason: HOSPADM

## 2018-10-01 RX ORDER — DEXAMETHASONE SODIUM PHOSPHATE 100 MG/10ML
INJECTION INTRAMUSCULAR; INTRAVENOUS
Status: DISCONTINUED | OUTPATIENT
Start: 2018-10-01 | End: 2018-10-01 | Stop reason: HOSPADM

## 2018-10-01 RX ORDER — FENTANYL CITRATE 50 UG/ML
INJECTION, SOLUTION INTRAMUSCULAR; INTRAVENOUS
Status: DISCONTINUED | OUTPATIENT
Start: 2018-10-01 | End: 2018-10-01 | Stop reason: HOSPADM

## 2018-10-01 RX ORDER — MIDAZOLAM HYDROCHLORIDE 1 MG/ML
INJECTION INTRAMUSCULAR; INTRAVENOUS
Status: DISCONTINUED | OUTPATIENT
Start: 2018-10-01 | End: 2018-10-01 | Stop reason: HOSPADM

## 2018-10-01 RX ORDER — SODIUM CHLORIDE 9 MG/ML
500 INJECTION, SOLUTION INTRAVENOUS CONTINUOUS
Status: DISCONTINUED | OUTPATIENT
Start: 2018-10-01 | End: 2018-10-01 | Stop reason: HOSPADM

## 2018-10-01 NOTE — DISCHARGE INSTRUCTIONS
Thank you for allowing us to care for you today. You may receive a survey about the care we provided. Your feedback is valuable and helps us provide excellent care throughout the community.     Home Care Instructions for Pain Management:    1. DIET:   You may resume your normal diet today.   2. BATHING:   You may shower with luke warm water. No tub baths or anything that will soak injection sites under water for the next 24 hours.  3. DRESSING:   You may remove your bandage today.   4. ACTIVITY LEVEL:   You may resume your normal activities 24 hrs after your procedure. Nothing strenuous today.  5. MEDICATIONS:   You may resume your normal medications today. To restart blood thinners, ask your doctor.  6. DRIVING    If you have received any sedatives by mouth today, you may not drive for 12 hours.    If you have received any sedation through your IV, you may not drive for 24 hrs.   7. SPECIAL INSTRUCTIONS:   No heat to the injection site for 24 hrs including, hot bath or shower, heating pad, moist heat, or hot tubs.    Use ice pack to injection site for any pain or discomfort.  Apply ice packs for 20 minute intervals as needed.    IF you have diabetes, be sure to monitor your blood sugar more closely. IF your injection contained steroids your blood sugar levels may become higher than normal.    If you are still having pain upon discharge:  Your pain may improve over the next 48 hours. The anesthetic (numbing medication) works immediately to 48 hours. IF your injection contained a steroid (anti-inflammatory medication), it takes approximately 3 days to start feeling relief and 7-10 days to see your greatest results from the medication. It is possible you may need subsequent injections. This would be discussed at your follow up appointment with pain management or your referring doctor.      PLEASE CALL YOUR DOCTOR IF:  1. Redness or swelling around the injection site.  2. Fever of 101 degrees or more  3. Drainage  (pus) from the injection site.  4. For any continuous bleeding (some dried blood over the incision is normal.)    FOR EMERGENCIES:   If any unusual problems or difficulties occur during clinic hours, call [phone] or [phone]. Adult Procedural Sedation Instructions    Recovery After Procedural Sedation (Adult)  You have been given medicine by vein to make you sleep during your surgery. This may have included both a pain medicine and sleeping medicine. Most of the effects have worn off. But you may still have some drowsiness for the next 6 to 8 hours.  Home care  Follow these guidelines when you get home:  · For the next 8 hours, you should be watched by a responsible adult. This person should make sure your condition is not getting worse.  · Don't drink any alcohol for the next 24 hours.  · Don't drive, operate dangerous machinery, or make important business or personal decisions during the next 24 hours.  Note: Your healthcare provider may tell you not to take any medicine by mouth for pain or sleep in the next 4 hours. These medicines may react with the medicines you were given in the hospital. This could cause a much stronger response than usual.  Follow-up care  Follow up with your healthcare provider if you are not alert and back to your usual level of activity within 12 hours.  When to seek medical advice  Call your healthcare provider right away if any of these occur:  · Drowsiness gets worse  · Weakness or dizziness gets worse  · Repeated vomiting  · You can't be awakened   Date Last Reviewed: 10/18/2016  © 8611-1339 [email or web address] [address] [place] [address], [place] [address]. All rights reserved. This information is not intended as a substitute for professional medical care. Always follow your healthcare professional's instructions.

## 2018-10-01 NOTE — INTERVAL H&P NOTE
"The patient has been examined and the H&P has been reviewed:    I concur with the findings and no changes have occurred since H&P was written.    Anesthesia/Surgery risks, benefits and alternative options discussed and understood by patient/family.      HPI  73 year old woman with a past medical history of HTN, lumbar radiculopathy    PMHx, PSHx, Allergies, Medications reviewed in epic    ROS negative except pain complaints in HPI    OBJECTIVE:    /62   Pulse 65   Temp 97.5 °F (36.4 °C) (Oral)   Resp 18   Ht 5' 3" (1.6 m)   Wt 84.8 kg (187 lb)   SpO2 95%   BMI 33.13 kg/m²     PHYSICAL EXAMINATION:    GENERAL: Well appearing, in no acute distress, alert and oriented x3.  PSYCH:  Mood and affect appropriate.  SKIN: Skin color, texture, turgor normal, no rashes or lesions.  CV: RRR with palpation of the radial artery.  PULM: No evidence of respiratory difficulty, symmetric chest rise. Clear to auscultation.  NEURO: Cranial nerves grossly intact.    Plan:    Proceed with procedure as planned epidural steroid injection    Ge Lara  10/01/2018      Active Hospital Problems    Diagnosis  POA    Chronic pain [G89.29]  Yes      Resolved Hospital Problems   No resolved problems to display.     "

## 2018-10-01 NOTE — DISCHARGE SUMMARY
Discharge Diagnosis:Lumbar radiculopathy [M54.16]  DDD (degenerative disc disease), lumbar [M51.36]  Condition on Discharge: Stable.  Diet on Discharge: Same as before.  Activity: as per instruction sheet.  Discharge to: Home with a responsible adult.  Follow up: 2-4 weeks &/or as per Discharge instructions

## 2018-10-01 NOTE — OP NOTE
Date of Service: 10/01/2018    PCP: Bev Watt NP    Referring Physician:    Time-out taken to identify patient and procedure side prior to starting the procedure.   I attest that I have reviewed the patient's home medications prior to the procedure and no contraindication have been identified. I  re-evaluated the patient after the patient was positioned for the procedure in the procedure room immediately before the procedural time-out. The vital signs are current and represent the current state of the patient which has not significantly changed since the preprocedure assessment.                                                           PROCEDURE: Bilateral transforaminal epidural steroid injection under fluoroscopy    REASON FOR PROCEDURE: Bilateral Lumbar radiculopathy [M54.16]  DDD (degenerative disc disease), lumbar [M51.36]  1. DDD (degenerative disc disease), lumbar    2. Chronic pain      POSTPROCEDURE DIAGNOSIS:   Lumbar radiculopathy [M54.16]  DDD (degenerative disc disease), lumbar [M51.36]    1. DDD (degenerative disc disease), lumbar    2. Chronic pain           PHYSICIAN: Terri Wolf MD  ASSISTANTS: Brianda Cook MD, Man Turner MD    MEDICATIONS INJECTED:  Preservative-free dexamethasone 10mg, Xylocaine 1% MPF 3-5ml. 3ml per level. Preservative free, sterile normal saline is used to get larger volume as needed.  LOCAL ANESTHETIC INJECTED:  Xylocaine 1% 9ml with Sodium Bicarbonate 1ml. 3ml per site.    SEDATION MEDICATIONS: 2 mg midazolam and 50 mcg fentanyl  ESTIMATED BLOOD LOSS:  None.    COMPLICATIONS:  None.    TECHNIQUE:   Laying in a prone position, the patient was prepped and draped in the usual sterile fashion using ChloraPrep and fenestrated drape.  The area to be injected was determined under fluoroscopic guidance.  Local anesthetic was given by raising a wheel and going down to the hub of a 27-gauge 1.25 inch needle.  The 3.5inch 22-gauge spinal needle was introduced towards the  transverse process of each above named nerve root level.  The needle was walked medially then hinged into the neural foramen.  Omnipaque was injected to confirm appropriate placement and that there was no vascular runoff.  The medication was then injected after applying negative pressure. The patient tolerated the procedure well.    PAIN BEFORE THE PROCEDURE: 6/10.    PAIN AFTER THE PROCEDURE: 1/10.    The patient was monitored after the procedure.  Patient was given post procedure and discharge instructions to follow at home.  We will see the patient back in two weeks or the patient may call to inform of status. The patient was discharged in a stable condition.

## 2018-10-29 ENCOUNTER — OFFICE VISIT (OUTPATIENT)
Dept: PAIN MEDICINE | Facility: CLINIC | Age: 73
End: 2018-10-29
Attending: ANESTHESIOLOGY
Payer: MEDICARE

## 2018-10-29 VITALS
RESPIRATION RATE: 18 BRPM | SYSTOLIC BLOOD PRESSURE: 130 MMHG | DIASTOLIC BLOOD PRESSURE: 72 MMHG | BODY MASS INDEX: 33.13 KG/M2 | HEART RATE: 74 BPM | WEIGHT: 187 LBS | TEMPERATURE: 98 F | HEIGHT: 63 IN

## 2018-10-29 DIAGNOSIS — M48.062 SPINAL STENOSIS OF LUMBAR REGION WITH NEUROGENIC CLAUDICATION: ICD-10-CM

## 2018-10-29 DIAGNOSIS — M51.36 DDD (DEGENERATIVE DISC DISEASE), LUMBAR: ICD-10-CM

## 2018-10-29 DIAGNOSIS — M16.11 PRIMARY OSTEOARTHRITIS OF RIGHT HIP: Primary | ICD-10-CM

## 2018-10-29 DIAGNOSIS — M54.16 LUMBAR RADICULOPATHY: ICD-10-CM

## 2018-10-29 DIAGNOSIS — M47.816 FACET ARTHRITIS OF LUMBAR REGION: ICD-10-CM

## 2018-10-29 DIAGNOSIS — M47.816 LUMBAR SPONDYLOSIS: ICD-10-CM

## 2018-10-29 PROCEDURE — 99213 OFFICE O/P EST LOW 20 MIN: CPT | Mod: PBBFAC | Performed by: NURSE PRACTITIONER

## 2018-10-29 PROCEDURE — 1101F PT FALLS ASSESS-DOCD LE1/YR: CPT | Mod: CPTII,,, | Performed by: NURSE PRACTITIONER

## 2018-10-29 PROCEDURE — 99213 OFFICE O/P EST LOW 20 MIN: CPT | Mod: S$PBB,,, | Performed by: NURSE PRACTITIONER

## 2018-10-29 PROCEDURE — 99999 PR PBB SHADOW E&M-EST. PATIENT-LVL III: CPT | Mod: PBBFAC,,, | Performed by: NURSE PRACTITIONER

## 2018-10-29 NOTE — PATIENT INSTRUCTIONS
Neurosurgery at [facility]- Dr. Ye, Dr. Coto    Orthopedics (hips) at [facility]- Dr. Aguilar, Dr. [facility]

## 2018-10-29 NOTE — PROGRESS NOTES
Chronic patient Established Note (Follow up visit)      SUBJECTIVE:    Letty Krueger presents to the clinic for a follow-up appointment for low back pain. She is s/p bilateral L3 TF TREVON on 10/1/2018. She reports no significant relief of her pain. She continues to report low back pain that radiates into her right groin and down the anterior aspect of her legs to her knees. She describes her pain as shooting. Her pain is worse with prolonged walking, activity, and turning over in bed. She continues to take Gabapentin with limited benefit. She continues to participate in a home exercise routine. She denies any other health changes. Her pain today is 7/10.      Pain Medications:  Gabapentin    Opioid Contract: no     report:  Reviewed and consistent with medication use as prescribed.    Pain Procedures:   9/6/2018- Bilateral L4 TF TREVON  10/1/2018- Bilateral L3 TF TREVON    Physical Therapy/Home Exercise: per self    Imaging:   Xray Lumbar Spine 8/24/2018:  FINDINGS:  There is osseous demineralization.  Vertebral body heights are maintained.  There is mild grade 1 anterolisthesis at L3-4 and L4-5.  This does not change significantly with flexion or extension.  There is mild osteophytic spurring at the thoracolumbar junction.  Moderate to advanced facet degenerative changes are present in the mid to lower lumbar spine.  Disc space narrowing present predominantly at L4-5 and L1-2.      Impression       Mild degenerative change     Xray Hips Bilateral 8/24/2018:  FINDINGS:  There is diffuse osseous demineralization.  There are degenerative changes in the lower lumbar spine.  There is severe narrowing at the right hip joint, with some sub chondral sclerosis and cyst formation and mild osteophyte formation.  There is no fracture.      Impression       As above     Outside MRI in media    Allergies: Review of patient's allergies indicates:  No Known Allergies    Current Medications:   Current Outpatient Medications    Medication Sig Dispense Refill    gabapentin (NEURONTIN) 400 MG capsule gabapentin 400 mg capsule      lisinopril-hydrochlorothiazide (PRINZIDE,ZESTORETIC) 20-25 mg Tab lisinopril 20 mg-hydrochlorothiazide 25 mg tablet      lisinopril-hydrochlorothiazide (PRINZIDE,ZESTORETIC) 20-25 mg Tab       rosuvastatin (CRESTOR) 10 MG tablet        No current facility-administered medications for this visit.        REVIEW OF SYSTEMS:    GENERAL:  No weight loss, malaise or fevers.  HEENT:  Negative for frequent or significant headaches.  NECK:  Negative for lumps, goiter, pain and significant neck swelling.  RESPIRATORY:  Negative for cough, wheezing or shortness of breath.  CARDIOVASCULAR:  Negative for chest pain, leg swelling or palpitations. HTN  GI:  Negative for abdominal discomfort, blood in stools or black stools or change in bowel habits.  MUSCULOSKELETAL:  See HPI.  SKIN:  Negative for lesions, rash, and itching.  PSYCH:  Negative for sleep disturbance, mood disorder and recent psychosocial stressors.  HEMATOLOGY/LYMPHOLOGY:  Negative for prolonged bleeding, bruising easily or swollen nodes.  NEURO:   No history of headaches, syncope, paralysis, seizures or tremors.  All other reviewed and negative other than HPI.    Past Medical History:  Past Medical History:   Diagnosis Date    Arthritis     DDD (degenerative disc disease), lumbar 9/6/2018    Hypertension        Past Surgical History:  Past Surgical History:   Procedure Laterality Date    Injection,steroid,epidural,transforaminal approach   Bilateral L3 Bilateral 10/1/2018    [facility]    Injection,steroid,epidural,transforaminal approach  L4 Bilateral 9/6/2018    Performed by Treri Wolf [facility] at [facility]       Family History:  Family History   Problem Relation Age of Onset    Diabetes Mother     Diabetes Father        Social History:  Social History     Socioeconomic History    Marital status: Single     Spouse  "name: Not on file    Number of children: Not on file    Years of education: Not on file    Highest education level: Not on file   Social Needs    Financial resource strain: Not on file    Food insecurity - worry: Not on file    Food insecurity - inability: Not on file    Transportation needs - medical: Not on file    Transportation needs - non-medical: Not on file   Occupational History    Not on file   Tobacco Use    Smoking status: Never Smoker   Substance and Sexual Activity    Alcohol use: Yes     Frequency: Monthly or less     Drinks per session: 1 or 2     Binge frequency: Never    Drug use: No    Sexual activity: Not Currently     Partners: Male   Other Topics Concern    Not on file   Social History Narrative    Not on file       OBJECTIVE:    /72   Pulse 74   Temp 98 °F (36.7 °C) (Oral)   Resp 18   Ht 5' 3" (1.6 m)   Wt 84.8 kg (187 lb)   BMI 33.13 kg/m²     PHYSICAL EXAMINATION:    General appearance: Well appearing, in no acute distress, alert and oriented x3.  Psych:  Mood and affect appropriate.  Skin: Skin color, texture, turgor normal, no rashes or lesions, in both upper and lower body.  Head/face:  Atraumatic, normocephalic. No palpable lymph nodes.  Cor: RRR  Pulm: CTA  GI: Abdomen soft and non-tender.  Back: Straight leg raising in the sitting position is negative to radicular pain. There is pain with palpation over lumbar spine. Limited ROM with pain on flexion and extension. Positive facet loading bilaterally.   Extremities: Peripheral joint ROM is full and pain free without obvious instability or laxity in all four extremities. No deformities, edema, or skin discoloration. Good capillary refill.  Musculoskeletal: Shoulder and knee provocative maneuvers are negative. There is pain with internal and external rotation of right hip. There is pain with palpation over the right SI joint. FABERs is positive to the right, negative to the left. Bilateral lower extremity strength " is normal and symmetric.  No atrophy or tone abnormalities are noted.  Neuro: Bilateral lower extremity coordination and muscle stretch reflexes are physiologic and symmetric.  Plantar response are downgoing. No loss of sensation is noted.  Gait: Antalgic- ambulates with cane.     ASSESSMENT: 73 y.o. year old female with low back pain, consistent with the followin. Primary osteoarthritis of right hip     2. Spinal stenosis of lumbar region with neurogenic claudication     3. Lumbar radiculopathy     4. Facet arthritis of lumbar region     5. DDD (degenerative disc disease), lumbar     6. Lumbar spondylosis           PLAN:     - Previous imaging was reviewed and discussed with the patient today.    - We discussed right hip joint injection. She was not interested today. She may call to schedule in the future.      - Continue Gabapentin.     - She would like to consider seeing neurosurgery or orthopedics at this time. She states that she will have a referral placed through [facility].     - I have stressed the importance of physical activity and a home exercise plan to help with pain and improve health.    - RTC PRN.     - Counseled patient regarding the importance of activity modification and constant sleeping habits.    - Dr. Wolf was consulted on the patient and agrees with this plan.    The above plan and management options were discussed at length with patient. Patient is in agreement with the above and verbalized understanding.    Mirta Jama NP  10/29/2018

## 2018-10-29 NOTE — LETTER
October 29, 2018      Ene Rae MD  [address]  [facility] [address]  Back [facility] Spine Center  [facility] [address]           [facility]  [address]  [facility] [address]  Phone: [phone]  Fax: [phone]          Patient: Letty Krueger   MR Number: [id number]   YOB: 1945   Date of Visit: 10/29/2018       Dear Dr. Ene Rae:    Thank you for referring Letty Krueger to me for evaluation. Attached you will find relevant portions of my assessment and plan of care.    If you have questions, please do not hesitate to call me. I look forward to following Letty Krueger along with you.    Sincerely,    Mirta Jama, NP    Enclosure  CC:  No Recipients    If you would like to receive this communication electronically, please contact [email or web address] or [phone] to request more information on [employer] Link access.    For providers and/or their staff who would like to refer a patient to [facility], please contact us through our one-stop-shop provider referral line, [facility] [place], at [phone].    If you feel you have received this communication in error or would no longer like to receive these types of communications, please e-mail [email or web address]

## 2018-11-29 NOTE — PROGRESS NOTES
Subjective:      Patient ID: Letty Krueger is a 73 y.o. female.    Chief Complaint: Low-back Pain and Hip Pain (right )    Ms Krueger is a 74 yo female here for follow up of low back pain.  She has had low back pain since sept 2017.  She has had back pain in the past, for the past 20 years and then the pain went away and returned.  She was last seen by me on 8/24/2018 and we did x-ray and sent her to pain management for bilateral L4 Tf TREVON done on 9/1 and then bilateral L3 on 10/1/2018.  Today, she feels better.  She does feel like the injections helped some.  She is considering ortho for her hip.  She does not want any more shots in her back.  The pain is in the back and right front of the right leg.  She has spasms in her feet twice a week.  She has itching in her feet.  She will use rubbing on her feet.  She saw neurology yesterday with [facility].  She feels like she is in a better frame of mind.  She has a hard time putting sock and shoe on right foot.  The pain is 4/10 now, worst 6/10 bending, shoes and socks and walking , best 4/10 being still    X-ray bilateral hips 8/24/2018  There is diffuse osseous demineralization.  There are degenerative changes in the lower lumbar spine.  There is severe narrowing at the right hip joint, with some sub chondral sclerosis and cyst formation and mild osteophyte formation.  There is no fracture.    Impression      As above      X-ray lumbar 8/24/2018  There is osseous demineralization.  Vertebral body heights are maintained.  There is mild grade 1 anterolisthesis at L3-4 and L4-5.  This does not change significantly with flexion or extension.  There is mild osteophytic spurring at the thoracolumbar junction.  Moderate to advanced facet degenerative changes are present in the mid to lower lumbar spine.  Disc space narrowing present predominantly at L4-5 and L1-2.    Impression      Mild degenerative change      MRI lumbar 7/2018  anterolisthesisL4 to L5 and L3 to L4  Central  stenosis at L3-4 and L4-5 and to lesser extent L1-2 And L2-3  Annular disc bulge T12-L1, L1-2, L2-3 and left paracentral disc herniation l5-S1    Past Medical History:  No date: Arthritis  No date: Hypertension    History reviewed. No pertinent surgical history.    Review of patient's family history indicates:  Problem: Diabetes      Relation: Mother          Age of Onset: (Not Specified)  Problem: Diabetes      Relation: Father          Age of Onset: (Not Specified)      Social History    Socioeconomic History      Marital status: Single      Spouse name: None      Number of children: None      Years of education: None      Highest education level: None    Social Needs      Financial resource strain: None      Food insecurity - worry: None      Food insecurity - inability: None      Transportation needs - medical: None      Transportation needs - non-medical: None    Occupational History      None    Tobacco Use      Smoking status: Never Smoker    Substance and Sexual Activity      Alcohol use: Yes        Frequency: Monthly or less        Drinks per session: 1 or 2        Binge frequency: Never      Drug use: No      Sexual activity: Not Currently        Partners: Male    Other Topics      Concerns:        None    Social History Narrative      None      Current Outpatient Medications:  gabapentin (NEURONTIN) 400 MG capsule, gabapentin 400 mg capsule, Disp: , Rfl:   lisinopril-hydrochlorothiazide (PRINZIDE,ZESTORETIC) 20-25 mg Tab, lisinopril 20 mg-hydrochlorothiazide 25 mg tablet, Disp: , Rfl:   lisinopril-hydrochlorothiazide (PRINZIDE,ZESTORETIC) 20-25 mg Tab, , Disp: , Rfl:   rosuvastatin (CRESTOR) 10 MG tablet, , Disp: , Rfl:     No current facility-administered medications for this visit.       Review of patient's allergies indicates:  No Known Allergies          Review of Systems   Constitution: Negative for weight gain and weight loss.   Cardiovascular: Negative for chest pain.   Respiratory: Negative for  shortness of breath.    Musculoskeletal: Positive for back pain (right leg pain). Negative for joint pain and joint swelling.   Gastrointestinal: Negative for abdominal pain and bowel incontinence.   Genitourinary: Negative for bladder incontinence.   Neurological: Negative for numbness.         Objective:        General: Letty LUCIA is well-developed, well-nourished, appears stated age, in no acute distress, alert and oriented to time, place and person.     General    Vitals reviewed.  Constitutional: She is oriented to person, place, and time. She appears well-developed and well-nourished.   HENT:   Head: Normocephalic and atraumatic.   Pulmonary/Chest: Effort normal.   Neurological: She is alert and oriented to person, place, and time.   Psychiatric: She has a normal mood and affect. Her behavior is normal. Judgment and thought content normal.     General Musculoskeletal Exam   Gait: normal     Right Ankle/Foot Exam     Tests   Heel Walk: able to perform  Tiptoe Walk: able to perform    Left Ankle/Foot Exam     Tests   Heel Walk: able to perform  Tiptoe Walk: able to perform      Right Hip Exam     Range of Motion   External rotation: 50   Internal rotation: 5     Tests   Pain w/ forced internal rotation (KELBY): absent  Left Hip Exam     Range of Motion   External rotation: 60   Internal rotation: 10     Tests   Pain w/ forced internal rotation (KELBY): absent      Back (L-Spine & T-Spine) / Neck (C-Spine) Exam     Tenderness Right paramedian tenderness of the Sacrum.     Back (L-Spine & T-Spine) Range of Motion   Extension: 10   Flexion: 80   Lateral bend right: 10   Lateral bend left: 10   Rotation right: 20   Rotation left: 20     Spinal Sensation   Right Side Sensation  C-Spine Level: normal   L-Spine Level: normal  S-Spine Level: normal  Left Side Sensation  C-Spine Level: normal  L-Spine Level: normal  S-Spine Level: normal    Back (L-Spine & T-Spine) Tests   Right Side Tests  Straight leg raise:       Sitting SLR: > 70 degrees      Left Side Tests  Straight leg raise:     Sitting SLR: > 70 degrees          Other She has no scoliosis .  Spinal Kyphosis:  Absent      Muscle Strength   Right Upper Extremity   Biceps: 5/5/5   Deltoid:  5/5  Triceps:  5/5  Wrist extension: 5/5/5   Finger Flexors:  5/5  Left Upper Extremity  Biceps: 5/5/5   Deltoid:  5/5  Triceps:  5/5  Wrist extension: 5/5/5   Finger Flexors:  5/5  Right Lower Extremity   Hip Flexion: 5/5   Quadriceps:  5/5   Anterior tibial:  5/5/5  EHL:  5/5  Left Lower Extremity   Hip Flexion: 5/5   Quadriceps:  5/5   Anterior tibial:  5/5/5   EHL:  5/5    Reflexes     Left Side  Biceps:  2+  Triceps:  2+  Brachioradialis:  2+  Quadriceps:  2+  Achilles:  2+  Left Jennings's Sign:  Absent  Babinski Sign:  absent    Right Side   Biceps:  2+  Triceps:  2+  Brachioradialis:  2+  Quadriceps:  2+  Achilles:  2+  Right Jennings's Sign:  absent  Babinski Sign:  absent    Vascular Exam     Right Pulses        Carotid:                  2+    Left Pulses        Carotid:                  2+              Assessment:       1. Primary osteoarthritis of right hip    2. Spinal stenosis of lumbar region with neurogenic claudication    3. Pain of both hip joints    4. Osteoarthritis of spine with radiculopathy, lumbar region    5. Chronic bilateral low back pain with bilateral sciatica           Plan:       Orders Placed This Encounter    HME - OTHER    Ambulatory consult to Orthopedics     1.  We discussed going to ortho for right hip pain and hip DJD, she would like to see ortho.  The main pain complaint is right anterior thigh which is recreated with hip ROM.    2.  She would like to wait on right hip injection  3.  TREVON gave some relief of left leg symptoms.   4.  She did not make it to PT due to copay.  She is exercising on her own.  She is going to go to park and get into pool  5.  She would like an toilet seat lift, hard to get up from sitting  6.  RTC 3  months        Follow-up: No Follow-up on file. If there are any questions prior to this, the patient was instructed to contact the office.

## 2018-11-30 ENCOUNTER — OFFICE VISIT (OUTPATIENT)
Dept: SPINE | Facility: CLINIC | Age: 73
End: 2018-11-30
Attending: PHYSICAL MEDICINE & REHABILITATION
Payer: MEDICARE

## 2018-11-30 VITALS
HEIGHT: 63 IN | BODY MASS INDEX: 33.13 KG/M2 | HEART RATE: 69 BPM | WEIGHT: 187 LBS | DIASTOLIC BLOOD PRESSURE: 66 MMHG | SYSTOLIC BLOOD PRESSURE: 148 MMHG

## 2018-11-30 DIAGNOSIS — M25.551 PAIN OF BOTH HIP JOINTS: ICD-10-CM

## 2018-11-30 DIAGNOSIS — G89.29 CHRONIC BILATERAL LOW BACK PAIN WITH BILATERAL SCIATICA: ICD-10-CM

## 2018-11-30 DIAGNOSIS — M48.062 SPINAL STENOSIS OF LUMBAR REGION WITH NEUROGENIC CLAUDICATION: ICD-10-CM

## 2018-11-30 DIAGNOSIS — M47.26 OSTEOARTHRITIS OF SPINE WITH RADICULOPATHY, LUMBAR REGION: ICD-10-CM

## 2018-11-30 DIAGNOSIS — M54.41 CHRONIC BILATERAL LOW BACK PAIN WITH BILATERAL SCIATICA: ICD-10-CM

## 2018-11-30 DIAGNOSIS — M25.552 PAIN OF BOTH HIP JOINTS: ICD-10-CM

## 2018-11-30 DIAGNOSIS — M16.11 PRIMARY OSTEOARTHRITIS OF RIGHT HIP: Primary | ICD-10-CM

## 2018-11-30 DIAGNOSIS — M54.42 CHRONIC BILATERAL LOW BACK PAIN WITH BILATERAL SCIATICA: ICD-10-CM

## 2018-11-30 PROCEDURE — 99999 PR PBB SHADOW E&M-EST. PATIENT-LVL III: CPT | Mod: PBBFAC,,, | Performed by: PHYSICAL MEDICINE & REHABILITATION

## 2018-11-30 PROCEDURE — 1101F PT FALLS ASSESS-DOCD LE1/YR: CPT | Mod: CPTII,S$GLB,, | Performed by: PHYSICAL MEDICINE & REHABILITATION

## 2018-11-30 PROCEDURE — 99214 OFFICE O/P EST MOD 30 MIN: CPT | Mod: S$GLB,,, | Performed by: PHYSICAL MEDICINE & REHABILITATION

## 2018-12-11 ENCOUNTER — OFFICE VISIT (OUTPATIENT)
Dept: ORTHOPEDICS | Facility: CLINIC | Age: 73
End: 2018-12-11
Payer: MEDICARE

## 2018-12-11 ENCOUNTER — HOSPITAL ENCOUNTER (OUTPATIENT)
Dept: RADIOLOGY | Facility: HOSPITAL | Age: 73
Discharge: HOME | End: 2018-12-11
Attending: NURSE PRACTITIONER
Payer: MEDICARE

## 2018-12-11 VITALS — BODY MASS INDEX: 33.27 KG/M2 | HEIGHT: 63 IN | WEIGHT: 187.75 LBS

## 2018-12-11 DIAGNOSIS — M25.551 RIGHT HIP PAIN: ICD-10-CM

## 2018-12-11 DIAGNOSIS — M25.551 RIGHT HIP PAIN: Primary | ICD-10-CM

## 2018-12-11 PROCEDURE — 99213 OFFICE O/P EST LOW 20 MIN: CPT | Mod: S$GLB,,, | Performed by: NURSE PRACTITIONER

## 2018-12-11 PROCEDURE — 99999 PR PBB SHADOW E&M-EST. PATIENT-LVL III: CPT | Mod: PBBFAC,,, | Performed by: NURSE PRACTITIONER

## 2018-12-11 PROCEDURE — 1101F PT FALLS ASSESS-DOCD LE1/YR: CPT | Mod: CPTII,S$GLB,, | Performed by: NURSE PRACTITIONER

## 2018-12-11 RX ORDER — LANOLIN ALCOHOL/MO/W.PET/CERES
1000 CREAM (GRAM) TOPICAL DAILY
COMMUNITY

## 2018-12-11 NOTE — LETTER
December 12, 2018      Ene Rae MD  [address]  [address]  Back [facility] Spine Center  [facility] [address]           [facility] - Orthopedics  [address], [address]  [facility] [address]  Phone: [phone]          Patient: Letty Krueger   MR Number: [id number]   YOB: 1945   Date of Visit: 12/11/2018       Dear Dr. Ene Rae:    Thank you for referring Letty Krueger to me for evaluation. Attached you will find relevant portions of my assessment and plan of care.    If you have questions, please do not hesitate to call me. I look forward to following Letty Krueger along with you.    Sincerely,    Jenny Arce, NP    Enclosure  CC:  No Recipients    If you would like to receive this communication electronically, please contact [email or web address] or [phone] to request more information on [employer] Link access.    For providers and/or their staff who would like to refer a patient to [facility], please contact us through our one-stop-shop provider referral line, [facility] , at [phone].    If you feel you have received this communication in error or would no longer like to receive these types of communications, please e-mail [email or web address]

## 2018-12-13 NOTE — PROGRESS NOTES
CC: Pain of the Right Hip      HPI: Pt with c/o right hip pain which radiates around the hip and down the thigh for the past year. She has been followed by back and spine and has had injections which helped some of her pain, but her hip continues to hurt. She had an xray which showed degenerative changes of the hip and she was referred to orthopedics. She has taken nsaids with some relief, but they are no longer relieving her pain. She is ambulating without assistive device. There is antalgic gait..    ROS  General: denies fever and chills  Resp: no c/o sob  CVS: no c/o cp  MSK: c/o right hip pain which is aching and radiates to the groin and thigh    PE  General: AAOx3, pleasant and cooperative  Resp: respirations even and unlabored  MSK: right hip exam  + stinchfield  + straight leg raise  + pain with internal rotation  + pain with external rotation  - pain over the greater trochanter    Xray:  Reviewed by me: There is diffuse osseous demineralization.  There are degenerative changes in the lower lumbar spine.  There is severe narrowing at the right hip joint, with some sub chondral sclerosis and cyst formation and mild osteophyte formation.  There is no fracture.    Assessment:  Right hip djd    Plan:  Discussed treatment options and patient would like to see the surgeon to discuss hip replacement. She can't remember the name of the surgeon that her doctor recommended and she wants to call back to make the appt.  Continue nsaids for pain relief for now

## 2018-12-14 ENCOUNTER — TELEPHONE (OUTPATIENT)
Dept: ORTHOPEDICS | Facility: CLINIC | Age: 73
End: 2018-12-14

## 2018-12-26 ENCOUNTER — OFFICE VISIT (OUTPATIENT)
Dept: ORTHOPEDICS | Facility: CLINIC | Age: 73
End: 2018-12-26
Payer: MEDICARE

## 2018-12-26 VITALS — HEIGHT: 63 IN | WEIGHT: 186.75 LBS | BODY MASS INDEX: 33.09 KG/M2

## 2018-12-26 DIAGNOSIS — M16.11 PRIMARY OSTEOARTHRITIS OF RIGHT HIP: Primary | ICD-10-CM

## 2018-12-26 PROCEDURE — 99999 PR PBB SHADOW E&M-EST. PATIENT-LVL III: CPT | Mod: PBBFAC,,, | Performed by: ORTHOPAEDIC SURGERY

## 2018-12-26 PROCEDURE — 99213 OFFICE O/P EST LOW 20 MIN: CPT | Mod: S$GLB,,, | Performed by: ORTHOPAEDIC SURGERY

## 2018-12-26 PROCEDURE — 1101F PT FALLS ASSESS-DOCD LE1/YR: CPT | Mod: CPTII,S$GLB,, | Performed by: ORTHOPAEDIC SURGERY

## 2018-12-26 NOTE — LETTER
December 26, 2018      Jenny Arce NP  [address]  [facility] [address]           [facility] - [facility]oped[facility]  [address]  [facility] [address]  Phone: [phone]          Patient: Letty Krueger   MR Number: [id number]   YOB: 1945   Date of Visit: 12/26/2018       Dear Jenny Arce:    Thank you for referring Letty Krueger to me for evaluation. Attached you will find relevant portions of my assessment and plan of care.    If you have questions, please do not hesitate to call me. I look forward to following Letty Krueger along with you.    Sincerely,    Stan Aguilar MD    Enclosure  CC:  No Recipients    If you would like to receive this communication electronically, please contact [email or web address] or [phone] to request more information on [employer] Link access.    For providers and/or their staff who would like to refer a patient to [facility], please contact us through our one-stop-shop provider referral line, [facility], at [phone].    If you feel you have received this communication in error or would no longer like to receive these types of communications, please e-mail [email or web address]

## 2018-12-26 NOTE — PROGRESS NOTES
Subjective:      Patient ID: Letty Krueger is a 73 y.o. female.    Chief Complaint: Pain of the Right Hip    HPI     Letty Krueger is a 73 year old female here with a 1 year history of right hip pain. The patient is a  retiree. There was not a history of trauma.  The pain is severe The pain is located in the groin.  There is is not radiation.  The pain is described as achy. The patient has not had prior surgery. It is aggravated by standing, walking and Other: flexion.  It  is alleviated by rest. There is not numbness or tingling of the lower extremity.  There is back pain.  She  has tried NSAIDS and an intrarticular CSI injection 1 year ago which helped.  She does have difficulty getting in or out of a car, getting dressed, or going up or down stairs.  The patient does use an assistive device.  HTN on Prinzid  HLD on simvastatin     Past Medical History:   Diagnosis Date    Arthritis     DDD (degenerative disc disease), lumbar 9/6/2018    Hypertension        Current Outpatient Medications on File Prior to Visit   Medication Sig Dispense Refill    cyanocobalamin (VITAMIN B-12) 1000 MCG tablet Take 100 mcg by mouth once daily.      gabapentin (NEURONTIN) 400 MG capsule gabapentin 400 mg capsule      lisinopril-hydrochlorothiazide (PRINZIDE,ZESTORETIC) 20-25 mg Tab lisinopril 20 mg-hydrochlorothiazide 25 mg tablet      lisinopril-hydrochlorothiazide (PRINZIDE,ZESTORETIC) 20-25 mg Tab       multivitamin capsule Take 1 capsule by mouth once daily.      rosuvastatin (CRESTOR) 10 MG tablet        No current facility-administered medications on file prior to visit.        Past Surgical History:   Procedure Laterality Date    Injection,steroid,epidural,transforaminal approach   Bilateral L3 Bilateral 10/1/2018    Performed by Terri Wolf [facility] at [facility]    Injection,steroid,epidural,transforaminal approach  L4 Bilateral 9/6/2018    Performed by Terri Wolf MD at [facility]       Family History    Problem Relation Age of Onset    Diabetes Mother     Diabetes Father        Social History     Socioeconomic History    Marital status: Single     Spouse name: Not on file    Number of children: Not on file    Years of education: Not on file    Highest education level: Not on file   Social Needs    Financial resource strain: Not on file    Food insecurity - worry: Not on file    Food insecurity - inability: Not on file    Transportation needs - medical: Not on file    Transportation needs - non-medical: Not on file   Occupational History    Not on file   Tobacco Use    Smoking status: Never Smoker   Substance and Sexual Activity    Alcohol use: Yes     Frequency: Monthly or less     Drinks per session: 1 or 2     Binge frequency: Never    Drug use: No    Sexual activity: Not Currently     Partners: Male   Other Topics Concern    Not on file   Social History Narrative    Not on file         Review of Systems   Constitution: Negative for chills, fever and night sweats.   HENT: Negative for hearing loss.    Eyes: Negative for blurred vision and double vision.   Cardiovascular: Negative for chest pain, claudication and leg swelling.   Respiratory: Negative for shortness of breath.    Endocrine: Negative for polydipsia, polyphagia and polyuria.   Hematologic/Lymphatic: Negative for adenopathy and bleeding problem. Does not bruise/bleed easily.   Skin: Negative for poor wound healing.   Musculoskeletal: Positive for arthritis, joint pain and stiffness. Negative for falls.   Gastrointestinal: Negative for diarrhea and heartburn.   Genitourinary: Negative for bladder incontinence.   Neurological: Negative for focal weakness, headaches, numbness, paresthesias and sensory change.   Psychiatric/Behavioral: The patient is not nervous/anxious.    Allergic/Immunologic: Negative for persistent infections.         Objective:      Body mass index is 33.08 kg/m².        General    Constitutional: She is oriented  to person, place, and time. She appears well-developed and well-nourished.   HENT:   Head: Normocephalic and atraumatic.   Eyes: EOM are normal.   Cardiovascular: Normal rate.    Pulmonary/Chest: Effort normal.   Neurological: She is alert and oriented to person, place, and time.   Psychiatric: She has a normal mood and affect. Her behavior is normal.     General Musculoskeletal Exam   Gait: normal   Pelvic Obliquity: none      Right Knee Exam     Inspection   Alignment:  normal  Effusion: absent    Left Knee Exam     Inspection   Alignment:  normal  Effusion: absent    Right Hip Exam     Inspection   Scars: absent  Swelling: absent  Bruising: absent  No deformity of hip.  Quadriceps Atrophy:  Negative  Erythema: absent    Range of Motion   Abduction: 25   Adduction: 20   Extension: 0   Flexion: 100   External rotation: 30   Internal rotation: 10     Tests   Pain w/ forced internal rotation (KELBY): absent  Stinchfield test: positive  Log Roll: positive    Other   Sensation: normal  Left Hip Exam     Inspection   Scars: absent  Swelling: absent  No deformity of hip.  Quadriceps Atrophy:  negative  Erythema: absent  Bruising: absent    Range of Motion   Abduction: 25   Adduction: 20   Extension: 0   Flexion: 100   External rotation: 30   Internal rotation: 25     Tests   Pain w/ forced internal rotation (KELBY): absent  Stinchfield test: negative  Log Roll: negative    Other   Sensation: normal      Back (L-Spine & T-Spine) / Neck (C-Spine) Exam   Back exam is normal.      Muscle Strength   Right Lower Extremity   Hip Abduction: 5/5   Hip Adduction: 5/5   Hip Flexion: 5/5   Ankle Dorsiflexion:  5/5   Left Lower Extremity   Hip Abduction: 5/5   Hip Adduction: 5/5   Hip Flexion: 5/5   Ankle Dorsiflexion:  5/5     Reflexes     Left Side  Quadriceps:  2+    Right Side   Quadriceps:  2+    Vascular Exam     Right Pulses  Dorsalis Pedis:      2+          Left Pulses  Dorsalis Pedis:      2+          Capillary Refill  Right  Hand: normal capillary refill  Left Hand: normal capillary refill    Edema  Right Upper Leg: absent  Left Upper Leg: absent        Body mass index is 33.08 kg/m².    Radiographs taken 8/24/18 and reviewed by me demonstrate severe arthritic change of the right hip(s).There  is bone destruction.  There is not a fracture.        Assessment:       Encounter Diagnosis   Name Primary?    Primary osteoarthritis of right hip Yes          Plan:       Letty LUCIA was seen today for pain.    Diagnoses and all orders for this visit:    Primary osteoarthritis of right hip  -     CT Hip Without Contrast Right; Future    Treatment options were discussed. The surgical process of robotically assisted right hip replacement was discussed in detail with Letty Lamins   including a detailed discussion of the procedure itself including bearing options and prognosis. The typical perioperative and post-operative course was discussed and perioperative risks were discussed to the patient's satisfaction.  Risks and complications discussed included but were not limited to the risks of anesthetic complications, infection, bleeding, wound healing complications, further surgery, aseptic loosening, instability, limb length inequality, neurologic dysfunction including numbness and weakness,  DVT, pulmonary embolism, perioperative medical risks (cardiac, pulmonary, renal, neurologic), and death and the patient elects to proceed. The patient should attend the joint seminar and get medically cleared.  ASA for DVT prophylaxis

## 2018-12-27 DIAGNOSIS — M16.11 PRIMARY OSTEOARTHRITIS OF RIGHT HIP: Primary | ICD-10-CM

## 2019-02-04 ENCOUNTER — TELEPHONE (OUTPATIENT)
Dept: ORTHOPEDICS | Facility: CLINIC | Age: 74
End: 2019-02-04

## 2019-02-04 NOTE — TELEPHONE ENCOUNTER
----- Message from Gracie Jenkins sent at 2/4/2019  1:11 PM CST -----  Contact: Pt  Pt is requesting a callback from office has questions about her procedure and what has to be done for the pre op visit    Pt can be reached at [phone]    Thanks

## 2019-02-04 NOTE — TELEPHONE ENCOUNTER
Pt was told she will get all of her surgery information at pre op appt. Pt was asking for surgery time but told she will get a call closer to surgery for time. Pt also states she will cancel her eye appointment because it is too close to hip surgery.

## 2019-02-11 ENCOUNTER — ANESTHESIA EVENT (OUTPATIENT)
Dept: SURGERY | Facility: HOSPITAL | Age: 74
DRG: 470 | End: 2019-02-11
Payer: MEDICARE

## 2019-02-11 DIAGNOSIS — M79.606 PAIN OF LOWER EXTREMITY, UNSPECIFIED LATERALITY: Primary | ICD-10-CM

## 2019-02-11 DIAGNOSIS — I10 HYPERTENSION, UNSPECIFIED TYPE: ICD-10-CM

## 2019-02-11 NOTE — PRE ADMISSION SCREENING
Anesthesia Assessment: Preoperative EQUATION    Planned Procedure: Procedure(s) (LRB):  ARTHROPLASTY, HIP, TOTAL, DANA COMPUTER-ASSISTED NAVIGATION (Right)  Requested Anesthesia Type:Spinal/Epidural  Surgeon: Stan Aguilar MD  Service: Orthopedics  Known or anticipated Date of Surgery:3/7/2019     Plan:    Testing:  CBC, CMP, PT/INR, T&S and EKG   Pre-anesthesia  visit       Visit focus: possible regional anesthesia and/or nerve block      Consultation: Perioperative Hospitalist     Sachi Mitchell RN 02/11/2019

## 2019-02-11 NOTE — ANESTHESIA PREPROCEDURE EVALUATION
Anesthesia Assessment: Preoperative EQUATION    Planned Procedure: Procedure(s) (LRB):  ARTHROPLASTY, HIP, TOTAL, DANA COMPUTER-ASSISTED NAVIGATION (Right)  Requested Anesthesia Type:Spinal/Epidural  Surgeon: Stan Aguilar MD  Service: Orthopedics  Known or anticipated Date of Surgery:3/7/2019     Plan:    Testing:  CBC, CMP, PT/INR, T&S and EKG   Pre-anesthesia  visit       Visit focus: possible regional anesthesia and/or nerve block      Consultation:IM Perioperative Hospitalist     Sachi Mitchell RN 02/11/2019 02/11/2019    Pre-operative evaluation for Procedure(s) (LRB):  ARTHROPLASTY, HIP, TOTAL, DANA COMPUTER-ASSISTED NAVIGATION (Right)    Letty Krueger is a 74 y.o. female     Patient Active Problem List   Diagnosis    DDD (degenerative disc disease), lumbar    Chronic pain    Essential hypertension    HLD (hyperlipidemia)    Elevated alkaline phosphatase level       Review of patient's allergies indicates:  No Known Allergies    No current facility-administered medications on file prior to encounter.      Current Outpatient Medications on File Prior to Encounter   Medication Sig Dispense Refill    cyanocobalamin (VITAMIN B-12) 1000 MCG tablet Take 1,000 mcg by mouth once daily.       gabapentin (NEURONTIN) 400 MG capsule gabapentin 400 mg capsule      lisinopril-hydrochlorothiazide (PRINZIDE,ZESTORETIC) 20-25 mg Tab lisinopril 20 mg-hydrochlorothiazide 25 mg tablet- 1 tablet oral daily      multivitamin capsule Take 1 capsule by mouth once daily.      rosuvastatin (CRESTOR) 10 MG tablet Take 10 mg by mouth once daily.          Past Surgical History:   Procedure Laterality Date    eye lid surgery - Clint syndrome      Injection,steroid,epidural,transforaminal approach   Bilateral L3 Bilateral 10/1/2018    [facility]     Injection,steroid,epidural,transforaminal approach  L4 Bilateral 2018    [facility] at [facility]       Social History     Socioeconomic History    Marital status: Single     Spouse name: Not on file    Number of children: Not on file    Years of education: Not on file    Highest education level: Not on file   Social Needs    Financial resource strain: Not on file    Food insecurity - worry: Not on file    Food insecurity - inability: Not on file    Transportation needs - medical: Not on file    Transportation needs - non-medical: Not on file   Occupational History    Not on file   Tobacco Use    Smoking status: Former Smoker     Last attempt to quit: 1969     Years since quittin.2    Smokeless tobacco: Never Used   Substance and Sexual Activity    Alcohol use: Yes     Frequency: Monthly or less     Drinks per session: 1 or 2     Binge frequency: Never     Comment: occasional     Drug use: No    Sexual activity: Not Currently     Partners: Male   Other Topics Concern    Not on file   Social History Narrative    Not on file       Diagnostic Studies:      EKG:  Normal sinus rhythm  Intraventricular conduction delay  Otherwise normal ECG  No previous ECGs available  Confirmed by Kulwinder Beltran MD ([id number]) on 2019 2:26:55 PM    2D Echo:  No results found for this or any previous visit.      Anesthesia Evaluation         Review of Systems  Anesthesia Hx:  No problems with previous Anesthesia   Social:  Former Smoker, Social Alcohol Use    Hematology/Oncology:  Hematology Normal   Oncology Normal     EENT/Dental:EENT/Dental Normal   Cardiovascular:   Hypertension hyperlipidemia Housework, cooks, grocery shopping, can climb a flight of stairs.  Denies chest pain or sob Disorder of Cardiac Conduction, Intraventricular Conduct Defect, Non-Specific Intraventricular Conduction Delay    Pulmonary:  Possible Obstructive Sleep Apnea , (STOP/BANG) Symptoms A - Age > 50 and P -  Pressure being treated for high BP    Renal/:  Renal/ Normal     Hepatic/GI:   Denies PUD. Denies GERD. Denies Liver Disease.  Liver Disease, Abnormal Liver Enzymes Alk phosphatase 137   Musculoskeletal:  Musculoskeletal General/Symptoms: Functional capacity is ambulatory with cane.  Joint Disease:  Arthritis, Osteoarthritis  Spine Disorders: Spinal Stenosis, Lumbar Spinal Stenosis (hx of epidural injections)   Neurological:   Denies CVA. Denies Seizures.  Pain , onset is chronic , location of hip , quality of aching/dull , severity is a 5 , precipitating factors are walking, bending down , alleviating factors are resting. Osteoarthritis  Denies Peripheral Neuropathy    Endocrine:  Endocrine Normal    Dermatological:  Skin Normal    Psych:  Psychiatric Normal           Physical Exam  General:  Well nourished, Obesity    Airway/Jaw/Neck:  Airway Findings: Mouth Opening: Normal Tongue: Normal  General Airway Assessment: Adult  Jaw/Neck Findings:      Dental:  Dental Findings: (missing teeth at bottom) Upper Dentures, In tact        Mental Status:  Mental Status Findings:  Cooperative, Alert and Oriented         Labs and ekg reviewed. T&S the am of surgery    Discharge plans: significant other Floyd Mansoor [phone]    Please refer to , Internal Medicine, perioperative risk assessment and recommendations.     Sachi Mitchell, RN 02/20/2019            Anesthesia Plan  Type of Anesthesia, risks & benefits discussed:  Anesthesia Type:  CSE, epidural, general, MAC, spinal  Patient's Preference:   Intra-op Monitoring Plan: standard ASA monitors  Intra-op Monitoring Plan Comments:   Post Op Pain Control Plan: multimodal analgesia and per primary service following discharge from PACU  Post Op Pain Control Plan Comments:   Induction:   IV  Beta Blocker:  Patient is not currently on a Beta-Blocker (No further documentation required).       Informed Consent: Patient understands risks and agrees with Anesthesia  plan.  Questions answered. Anesthesia consent signed with patient.  ASA Score: 2     Day of Surgery Review of History & Physical:    H&P update referred to the surgeon.         Ready For Surgery From Anesthesia Perspective.

## 2019-02-19 ENCOUNTER — HOSPITAL ENCOUNTER (OUTPATIENT)
Dept: RADIOLOGY | Facility: HOSPITAL | Age: 74
Discharge: HOME | End: 2019-02-19
Attending: ORTHOPAEDIC SURGERY
Payer: MEDICARE

## 2019-02-19 DIAGNOSIS — M16.11 PRIMARY OSTEOARTHRITIS OF RIGHT HIP: ICD-10-CM

## 2019-02-19 PROCEDURE — 73700 CT LOWER EXTREMITY W/O DYE: CPT | Mod: 26,RT,, | Performed by: RADIOLOGY

## 2019-02-19 PROCEDURE — 73700 CT HIP WITHOUT CONTRAST RIGHT: ICD-10-PCS | Mod: 26,RT,, | Performed by: RADIOLOGY

## 2019-02-19 PROCEDURE — 73700 CT LOWER EXTREMITY W/O DYE: CPT | Mod: TC,RT

## 2019-02-20 ENCOUNTER — HOSPITAL ENCOUNTER (OUTPATIENT)
Dept: PREADMISSION TESTING | Facility: HOSPITAL | Age: 74
Discharge: HOME | End: 2019-02-20
Attending: ANESTHESIOLOGY
Payer: MEDICARE

## 2019-02-20 ENCOUNTER — INITIAL CONSULT (OUTPATIENT)
Dept: INTERNAL MEDICINE | Facility: CLINIC | Age: 74
End: 2019-02-20
Payer: MEDICARE

## 2019-02-20 ENCOUNTER — HOSPITAL ENCOUNTER (OUTPATIENT)
Dept: CARDIOLOGY | Facility: CLINIC | Age: 74
Discharge: HOME | End: 2019-02-20
Attending: ANESTHESIOLOGY
Payer: MEDICARE

## 2019-02-20 VITALS
SYSTOLIC BLOOD PRESSURE: 139 MMHG | HEART RATE: 77 BPM | TEMPERATURE: 98 F | BODY MASS INDEX: 32.67 KG/M2 | HEIGHT: 63 IN | WEIGHT: 184.38 LBS | OXYGEN SATURATION: 98 % | DIASTOLIC BLOOD PRESSURE: 65 MMHG

## 2019-02-20 DIAGNOSIS — I10 ESSENTIAL HYPERTENSION: ICD-10-CM

## 2019-02-20 DIAGNOSIS — I10 HYPERTENSION, UNSPECIFIED TYPE: ICD-10-CM

## 2019-02-20 DIAGNOSIS — R74.8 ELEVATED ALKALINE PHOSPHATASE LEVEL: ICD-10-CM

## 2019-02-20 DIAGNOSIS — M79.606 PAIN OF LOWER EXTREMITY, UNSPECIFIED LATERALITY: ICD-10-CM

## 2019-02-20 DIAGNOSIS — M16.11 PRIMARY OSTEOARTHRITIS OF RIGHT HIP: Primary | ICD-10-CM

## 2019-02-20 DIAGNOSIS — Z01.818 PREOP EXAMINATION: Primary | ICD-10-CM

## 2019-02-20 DIAGNOSIS — E78.5 HYPERLIPIDEMIA, UNSPECIFIED HYPERLIPIDEMIA TYPE: ICD-10-CM

## 2019-02-20 DIAGNOSIS — M25.551 PAIN OF RIGHT HIP JOINT: Primary | ICD-10-CM

## 2019-02-20 PROCEDURE — 1101F PR PT FALLS ASSESS DOC 0-1 FALLS W/OUT INJ PAST YR: ICD-10-PCS | Mod: CPTII,S$GLB,, | Performed by: HOSPITALIST

## 2019-02-20 PROCEDURE — 3074F PR MOST RECENT SYSTOLIC BLOOD PRESSURE < 130 MM HG: ICD-10-PCS | Mod: CPTII,S$GLB,, | Performed by: HOSPITALIST

## 2019-02-20 PROCEDURE — 3074F SYST BP LT 130 MM HG: CPT | Mod: CPTII,S$GLB,, | Performed by: HOSPITALIST

## 2019-02-20 PROCEDURE — 93000 ELECTROCARDIOGRAM COMPLETE: CPT | Mod: S$GLB,,, | Performed by: INTERNAL MEDICINE

## 2019-02-20 PROCEDURE — 99204 PR OFFICE/OUTPT VISIT, NEW, LEVL IV, 45-59 MIN: ICD-10-PCS | Mod: S$GLB,,, | Performed by: HOSPITALIST

## 2019-02-20 PROCEDURE — 99999 PR PBB SHADOW E&M-EST. PATIENT-LVL II: ICD-10-PCS | Mod: PBBFAC,,, | Performed by: HOSPITALIST

## 2019-02-20 PROCEDURE — 3078F PR MOST RECENT DIASTOLIC BLOOD PRESSURE < 80 MM HG: ICD-10-PCS | Mod: CPTII,S$GLB,, | Performed by: HOSPITALIST

## 2019-02-20 PROCEDURE — 1101F PT FALLS ASSESS-DOCD LE1/YR: CPT | Mod: CPTII,S$GLB,, | Performed by: HOSPITALIST

## 2019-02-20 PROCEDURE — 93000 EKG 12-LEAD: ICD-10-PCS | Mod: S$GLB,,, | Performed by: INTERNAL MEDICINE

## 2019-02-20 PROCEDURE — 3078F DIAST BP <80 MM HG: CPT | Mod: CPTII,S$GLB,, | Performed by: HOSPITALIST

## 2019-02-20 PROCEDURE — 99999 PR PBB SHADOW E&M-EST. PATIENT-LVL II: CPT | Mod: PBBFAC,,, | Performed by: HOSPITALIST

## 2019-02-20 PROCEDURE — 99204 OFFICE O/P NEW MOD 45 MIN: CPT | Mod: S$GLB,,, | Performed by: HOSPITALIST

## 2019-02-20 NOTE — OUTPATIENT SUBJECTIVE & OBJECTIVE
Outpatient Subjective & Objective     Chief complaint-Preoperative evaluation, Perioperative Medical management, complication reduction plan     Active cardiac conditions- none    Revised cardiac risk index predictors- none    Functional capacity -Examples of physical activity,   house work and can take a flight of stairs holding on to the railing----- She can undertake all the above activities without  chest pain,chest tightness, Shortness of breath ,dizziness,lightheadedness making her exercise tolerance more than 4 Mets.       Review of Systems   Constitutional: Negative for chills and fever.        No unusual weight changes   HENT:        STOPBANG score  2/ 8    HTN  Age over 50        Eyes:        Needs cataract surgery Left eye   Respiratory:        Occasional Dry cough   No Hemoptysis   Cardiovascular:        As noted   Gastrointestinal:        No overt GI/ blood losses  Bowel movements- Regular    Endocrine:        Prednisone use > 20 mg daily for 3 weeks- None    Genitourinary: Negative for dysuria.        No urinary hesitancy    Musculoskeletal:        As above   Also has low back pain - she attributes to Arthritis, spinal stenosis   Skin: Negative for rash.   Neurological: Negative for syncope.        No unilateral weakness   Hematological:        Current use of Anticoagulants  Current use of Antiplatelet agents  none    Psychiatric/Behavioral:        No Depression,Anxiety     No vascular stenting            No anesthesia, bleeding, cardiac problems ,PONV with previous surgeries/procedures.  Medications and Allergies reviewed in epic.   FH- No anesthesia,bleeding / venous thrombosis ,  in family   Lives alone- Help available  post op     Physical Exam     /65   Pulse 77   Temp 98.2  Sat 98 %       Physical Exam  Constitutional- General appearance-Conscious,Coherent  Eyes- No conjunctival icterus,pupils  round  and reactive to light   ENT-Oral cavity- moist  and  upper denture , Hearing grossly  normal   Neck- No thyromegaly ,Trachea -central, No jugular venous distension,   No Carotid Bruit   Cardiovascular -Heart Sounds- Normal  and  no murmur   , No gallop rhythm   Respiratory - Normal Respiratory Effort, Normal breath sounds,  no wheeze  and  no forced expiratory wheeze    Peripheral pitting pedal edema-- none , no calf pain   Gastrointestinal -Soft abdomen, No palpable masses, Non Tender,Liver,Spleen not palpable. No-- free fluid and shifting dullness  Musculoskeletal- No finger Clubbing. Strength grossly normal   Lymphatic-No Palpable cervical, axillary,Inguinal lymphadenopathy   Psychiatric - normal effect,Orientation  Rt Dorsalis pedis pulses-palpable    Lt Dorsalis pedis pulses- palpable   Rt Posterior tibial pulses -palpable   Left posterior tibial pulses -palpable   Miscellaneous -  no renal bruit    Investigations    EKG, labs pending       Review of old records- Was done and information gathered regards to events leading to surgery and health conditions of significance in the perioperative period.    Outpatient Subjective & Objective

## 2019-02-20 NOTE — LETTER
February 20, 2019      Riley Aceves MD  [address] [address]           [facility]  [address] [address]  Phone: [phone]          Patient: Letty Krueger   MR Number: [id number]   YOB: 1945   Date of Visit: 2/20/2019       Dear Dr. Riley Aceves:    Thank you for referring Letty Krueger to me for evaluation. Attached you will find relevant portions of my assessment and plan of care.    If you have questions, please do not hesitate to call me. I look forward to following Letty Krueger along with you.    Sincerely,    Eneida Larson MD    Enclosure  CC:  No Recipients    If you would like to receive this communication electronically, please contact [email or web address] or [phone] to request more information on [employer] access.    For providers and/or their staff who would like to refer a patient to [facility], please contact us through our one-stop-shop provider referral line, [facility], at [phone].    If you feel you have received this communication in error or would no longer like to receive these types of communications, please e-mail [email or web address]

## 2019-02-20 NOTE — HPI
History of present illness- I had the pleasure of meeting this pleasant 74 y.o. lady in the pre op clinic prior to her elective Orthopedic surgery. The patient is new to me .     I have obtained the history by speaking to the patient and by reviewing the electronic health records.    Events leading up to surgery / History of presenting illness -    She has been troubled with moderate-severe  rt hip   pain for about 1.5 years  . Pain increases with activity and decreases with resting.    Relevant health conditions of significance for the perioperative period/ History of presenting illness -    Subjectively describes health as good    Was very active until 1.5 years ago - Was going out , was riding the bus, taking stairs   Stays reasonably active - [facility]  Lives in a senior citizens apartment - on the 3rd floor and has taken flights of stairs when elevator broke down    A few days ago , took the stairs , coming down as the elevator was crowded   Health conditions of significance for the perioperative period - HTN    Not known to have heart disease , Diabetes Mellitus, Lung disease

## 2019-02-20 NOTE — DISCHARGE INSTRUCTIONS
Your surgery has been scheduled for:__________________________________________    You should report to:  ____[facility], located on the [place] side of the first floor of the           [facility] ([phone])  ____[facility], located on the [facility] side of the            Second floor of the [facility] ([phone])  ____3rd Floor [facility] located on the [facility] side of the [facility] [phone]  Please Note   - Tell your doctor if you take Aspirin, products containing Aspirin, herbal medications  or blood thinners, such as Coumadin, Ticlid, or Plavix.  (Consult your provider regarding holding or stopping before surgery).  - Arrange for someone to drive you home following surgery.  You will not be allowed to leave the surgical facility alone or drive yourself home following sedation and anesthesia.  Before Surgery  - Stop taking all herbal medications 14days prior to surgery  - No Motrin/Advil (Ibuprofen) 7 days before surgery  - No Aleve (Naproxen) 7 days before surgery  - No Goody's/BC powder 7 days before surgery  - Stop Taking Asprin, products containing Asprin _____days before surgery  - Stop taking blood thinners_______days before surgery  - Refrain from drinking alcoholic beverages for 24hours before and after surgery  - Stop or limit smoking _________days before surgery  - You may take Tylenol for pain  Night before Surgery  - Take a shower or bath (shower is recommended).  Bathe with Hibiclens soap or an antibacterial soap from the neck down.  If not supplied by your surgeon, hibiclens soap will need to be purchased over the counter in pharmacy.  Rinse soap off thoroughly.  - Shampoo your hair with your regular shampoo                           Food and Beverage Instructions  1. Stop ALL solid food, gum, candy (including vitamins) 8 hours before surgery/procedure time.  2. The patient should be  ENCOURAGED to drink carbohydrate-rich clear liquids (sports drinks, clear juices) until 2 hours prior to surgery/procedure time.  3. CLEAR liquids include only water, black coffee NO creamer, clear oral rehydration drinks, clear sports drinks or clear fruit juices (no orange juice, no pulpy juices, no apple cider). Advise patients if they can read newsprint through the liquid, it qualifies as clear liquid.   4. IF IN DOUBT, drink water instead.   5. NOTHING  TO DRINK 2 hours before to arrival for surgery/procedure time. If you are told to take medication on the morning of surgery, it may be taken with a sip of water.     FOLLOW YOUR SURGEON'S INSTRUCTIONS REGARDING FOOD AND BEVERAGES IF DIFFERENT THAN ABOVE INSTRUCTIONS.    The Day of Surgery  - Take another bath or shower with hibiclens or any antibacterial soap, to reduce the chance of infection.  - Take heart and blood pressure medications with a small sip of water, as advised by the perioperative team.  - Do not take fluid pills  - You may brush your teeth and rinse your mouth, but do not swall any additional water.   - Do not apply perfumes, powder, body lotions or deodorant on the day of surgery.  - Nail polish should be removed.  - Do not wear makeup or moisturizer  - Wear comfortable clothes, such as a button front shirt and loose fitting pants.  - Leave all jewelry, including body piercings, and valuables at home.    - Bring any devices you will neeed after surgery such as crutches or canes.  - If you have sleep apnea, please bring your CPAP machine  In the event that your physical condition changes including the onset of a cold or respiratory illness, or if you have to delay or cancel your surgery, please notify your surgeon.    Anesthesia: Regional Anesthesia    Youre scheduled for surgery. During surgery, youll receive medicine called anesthesia to keep you comfortable and pain-free. Your surgeon has decided that youll receive regional anesthesia.  This sheet tells you what to expect with this type of anesthesia.  What is regional anesthesia?  Regional anesthesia numbs one region of your body. The anesthesia may be given around nerves or into veins in your arms, neck, or legs (nerve block or Chatmoss block). Or it may be sent into the spinal fluid (spinal anesthesia) or into the space just outside the spinal fluid (epidural anesthesia). You may also be given sedatives to help you relax.  Nerve block or Chatmoss block  A small area of the body, such as an arm or leg, can be numbed using a nerve block or Vitaly block.  · Nerve block. During a nerve block, your skin is numbed. A needle is then inserted near nerves that serve the area to be numbed. Anesthetic is sent through the needle.  · IV regional or Chatmoss block. For this type of block, an IV line is put into a vein. The blood flow to the area to be numbed is blocked for a short time. Anesthetic is sent through the IV.  Spinal anesthesia  Spinal anesthesia numbs your body from about the waist down.  · Anesthetic is injected into the spinal fluid. This is a substance that surrounds the spinal cord in your spinal column. The anesthetic blocks pain traveling from the body to the brain.  · To receive the anesthetic, your skin is numbed at the injection site on your back.  · A needle is then inserted into the spinal space. Anesthetic is sent into the spinal fluid through the needle.  Epidural anesthesia  Epidural anesthesia is most commonly used during childbirth and may also be used after surgical procedures of the chest, belly, and legs.  · Anesthetic is injected into the epidural space. This is just outside the dural sac which contains the spinal fluid.  · To receive the anesthetic, your skin is numbed at the injection site on your back.  · A needle is then inserted into the epidural space. Anesthetic is sent into the epidural space through the needle.  · A small flexible catheter may be attached to the needle and left in  place. This allows for continuous injections or infusions of anesthetic.  Anesthesia tools and medicines that might be near you during your procedure  · Local anesthetic. This medicine is given through a needle numbs one region of your body.  · Electrocardiography leads (electrodes). These are used to record your heart rate and rhythm.  · Blood pressure cuff. A cuff is placed on your arm to keep track of your blood pressure.  · Pulse oximeter. This small clip is placed on the end of the finger. It measures your blood oxygen level.  · Sedatives. These medicines may be given through an IV. They help to relax you and keep you comfortable. You may stay awake or sleep lightly.  · Oxygen. You may be given oxygen through a facemask.  Risks and possible complications  Regional anesthesia carries some risks. These include:  · Nausea and vomiting  · Headache  · Backache  · Decreased blood pressure  · Allergic reaction to the anesthetic  · Ongoing numbness (rare)  · Irregular heartbeat (rare)  · Cardiac arrest (rare)   Date Last Reviewed: 12/1/2016  © 5507-1488 [employer]. [address] [place] [address]. All rights reserved. This information is not intended as a substitute for professional medical care. Always follow your healthcare professional's instructions.

## 2019-02-20 NOTE — ASSESSMENT & PLAN NOTE
Lisinopril- HCTZ   Home BP readings -  Recent BP readings in the [id number]'s  Hypertension-  Blood pressure is acceptable .  I suggest holdingLisinopril- HCTZ   on the morning of the surgery and can continue that  post operatively under blood pressure, electrolyte and renal function monitoring as long as they are acceptable.I suggest addressing pain control as uncontrolled pain can increased blood pressure

## 2019-02-20 NOTE — PROGRESS NOTES
[facility] - Pre Op Consult  Progress Note    Patient Name: Letty Krueger  MRN: [id number]  Date of Evaluation- 03/06/2019  PCP- Bev Watt NP    Future cases for Letty Krueger [[id number]]     Case ID Status Date Time Zurdo Procedure Provider Location    [id number] [facility] 3/7/2019  7:00  ARTHROPLASTY, HIP, TOTAL, DANA COMPUTER-ASSISTED NAVIGATION Stan Aguilar MD [[id number]] [facility] OR 2ND FLR      Rt     HPI:  History of present illness- I had the pleasure of meeting this pleasant 74 y.o. lady in the pre op clinic prior to her elective Orthopedic surgery. The patient is new to me .     I have obtained the history by speaking to the patient and by reviewing the electronic health records.    Events leading up to surgery / History of presenting illness -    She has been troubled with moderate-severe  rt hip   pain for about 1.5 years  . Pain increases with activity and decreases with resting.    Relevant health conditions of significance for the perioperative period/ History of presenting illness -    Subjectively describes health as good    Was very active until 1.5 years ago - Was going out , was riding the bus, taking stairs   Stays reasonably active - Goes to [facility]  Lives in a senior citizens apartment - on the 3rd floor and has taken flights of stairs when elevator broke down    A few days ago , took the stairs , coming down as the elevator was crowded   Health conditions of significance for the perioperative period - HTN    Not known to have heart disease , Diabetes Mellitus, Lung disease         Subjective/ Objective:          Chief complaint-Preoperative evaluation, Perioperative Medical management, complication reduction plan     Active cardiac conditions- none    Revised cardiac risk index predictors- none    Functional capacity -Examples of physical activity,   house work and can take a flight of stairs holding on to the railing----- She can undertake all the above activities without  chest pain,chest  tightness, Shortness of breath ,dizziness,lightheadedness making her exercise tolerance more than 4 Mets.       Review of Systems   Constitutional: Negative for chills and fever.        No unusual weight changes   HENT:        STOPBANG score  2/ 8    HTN  Age over 50        Eyes:        Needs cataract surgery Left eye   Respiratory:        Occasional Dry cough   No Hemoptysis   Cardiovascular:        As noted   Gastrointestinal:        No overt GI/ blood losses  Bowel movements- Regular    Endocrine:        Prednisone use > 20 mg daily for 3 weeks- None    Genitourinary: Negative for dysuria.        No urinary hesitancy    Musculoskeletal:        As above   Also has low back pain - she attributes to Arthritis, spinal stenosis   Skin: Negative for rash.   Neurological: Negative for syncope.        No unilateral weakness   Hematological:        Current use of Anticoagulants  Current use of Antiplatelet agents  none    Psychiatric/Behavioral:        No Depression,Anxiety     No vascular stenting            No anesthesia, bleeding, cardiac problems ,PONV with previous surgeries/procedures.  Medications and Allergies reviewed in epic.   FH- No anesthesia,bleeding / venous thrombosis ,  in family   Lives alone- Help available  post op     Physical Exam     /65   Pulse 77   Temp 98.2  Sat 98 %       Physical Exam  Constitutional- General appearance-Conscious,Coherent  Eyes- No conjunctival icterus,pupils  round  and reactive to light   ENT-Oral cavity- moist  and  upper denture , Hearing grossly normal   Neck- No thyromegaly ,Trachea -central, No jugular venous distension,   No Carotid Bruit   Cardiovascular -Heart Sounds- Normal  and  no murmur   , No gallop rhythm   Respiratory - Normal Respiratory Effort, Normal breath sounds,  no wheeze  and  no forced expiratory wheeze    Peripheral pitting pedal edema-- none , no calf pain   Gastrointestinal -Soft abdomen, No palpable masses, Non Tender,Liver,Spleen not  palpable. No-- free fluid and shifting dullness  Musculoskeletal- No finger Clubbing. Strength grossly normal   Lymphatic-No Palpable cervical, axillary,Inguinal lymphadenopathy   Psychiatric - normal effect,Orientation  Rt Dorsalis pedis pulses-palpable    Lt Dorsalis pedis pulses- palpable   Rt Posterior tibial pulses -palpable   Left posterior tibial pulses -palpable   Miscellaneous -  no renal bruit    Investigations    EKG, labs pending       Review of old records- Was done and information gathered regards to events leading to surgery and health conditions of significance in the perioperative period.        Preoperative cardiac risk assessment-  The patient does not have any active cardiac conditions . Revised cardiac risk index predictors-0 ---.Functional capacity is more than 4 Mets. She will be undergoing a Orthopedic procedure that carries a intermediate risk     The estimated risk of the rate of adverse cardiac outcomes  0.4%    No further cardiac work up is indicated prior to proceeding with the surgery          American Society of Anesthesiologists Physical status classification ( ASA ) class: 3     Postoperative pulmonary complication risk assessment:     ARISCAT ( Canet) risk index- risk class -  Low, if duration of surgery is under 3 hours, intermediate, if duration of surgery is over 3 hours      Stacey Respiratory failure index- percentage risk of respiratory failure: 0.5 %     Assessment/Plan:     Essential hypertension  Lisinopril- HCTZ   Home BP readings -  Recent BP readings in the [id number]'s  Hypertension-  Blood pressure is acceptable .  I suggest holdingLisinopril- HCTZ   on the morning of the surgery and can continue that  post operatively under blood pressure, electrolyte and renal function monitoring as long as they are acceptable.I suggest addressing pain control as uncontrolled pain can increased blood pressure     HLD (hyperlipidemia)  HLD-I  suggest continuation of  statin during the entire perioperative period.    Elevated alkaline phosphatase level  Mildly elevated   Normal Total bilirubin   For now , suggest follow up         Preventive perioperative care    Thromboembolic prophylaxis:  Her risk factors for thrombosis include obesity, surgical procedure and age.I suggest  thromboembolic prophylaxis ( mechanical/pharmacological, weighing the risk benefits of pharmacological agent use considering blas procedural bleeding )  during the perioperative period.I suggested being active in the post operative period. The patient is a candidate for extended DVT prophylaxis     Postoperative pulmonary complication prophylaxis-Risk factors for post operative pulmonary complications include age over 65 years and ASA class >2- I suggest incentive spirometry use, early ambulation and end tidal carbon dioxide monitoring  , oral care , head end of bed end bed elevation      Renal complication prophylaxis-Risk factors for renal complications include hypertension . I suggest keeping her well hydrated.I suggested drinking 2 litre's of water a day      Surgical site Infection Prophylaxis-I  suggest appropriate antibiotic for Prophylaxis against Surgical site infections       This visit was focused on Preoperative evaluation, Perioperative Medical management, complication reduction plans. I suggest that the patient follows up with primary care or relevant sub specialists for ongoing health care.    I appreciate the opportunity to be involved in this patients care. Please feel free to contact me if there were any questions about this consultation.    Patient is optimized     Patient  was instructed to call and update me about any changes to health,  medication, office visits ,testing out side of the blas operative care center , hospitalizations between now and surgery     Eneida Larson MD  Perioperative Medicine  [facility]   Pager [phone]  ----  2/10- 18 30     EKG from  2/20.2019   Personally reviewed reportedly showed   ,Normal sinus rhythm  Intraventricular conduction delay  Otherwise normal ECG  No previous ECGs available     labs - Hb, HCT,PLT-INR-N  Alk phos very mildly elevated -N bilirubin   ---  2/26- 11 02     Called to discuss Alkphos elevation   Forwarded office note to PCP for follow up   Left a message to follow up on mildly elevated  alkaline phosphatase   ----  3/6- 15 24     Called to follow up , to address any concerns with the up coming surgery or any questions on Medication instructions   Unable to speak   Left a message to call, if needed   Left a message to follow up on mildly elevated  alkaline phosphatase - spelt for her

## 2019-02-25 ENCOUNTER — OFFICE VISIT (OUTPATIENT)
Dept: ORTHOPEDICS | Facility: CLINIC | Age: 74
End: 2019-02-25
Payer: MEDICARE

## 2019-02-25 ENCOUNTER — HOSPITAL ENCOUNTER (OUTPATIENT)
Dept: RADIOLOGY | Facility: HOSPITAL | Age: 74
Discharge: HOME | End: 2019-02-25
Attending: PHYSICIAN ASSISTANT
Payer: MEDICARE

## 2019-02-25 VITALS — HEIGHT: 62 IN | BODY MASS INDEX: 33.92 KG/M2 | WEIGHT: 184.31 LBS

## 2019-02-25 DIAGNOSIS — M25.551 PAIN OF RIGHT HIP JOINT: ICD-10-CM

## 2019-02-25 DIAGNOSIS — M16.11 PRIMARY OSTEOARTHRITIS OF RIGHT HIP: Primary | ICD-10-CM

## 2019-02-25 PROBLEM — R74.8 ELEVATED ALKALINE PHOSPHATASE LEVEL: Status: ACTIVE | Noted: 2019-02-25

## 2019-02-25 PROCEDURE — 73502 XR HIP 2 VIEW RIGHT: ICD-10-PCS | Mod: 26,RT,, | Performed by: RADIOLOGY

## 2019-02-25 PROCEDURE — 73502 X-RAY EXAM HIP UNI 2-3 VIEWS: CPT | Mod: 26,RT,, | Performed by: RADIOLOGY

## 2019-02-25 PROCEDURE — 99499 UNLISTED E&M SERVICE: CPT | Mod: S$GLB,,, | Performed by: PHYSICIAN ASSISTANT

## 2019-02-25 PROCEDURE — 73502 X-RAY EXAM HIP UNI 2-3 VIEWS: CPT | Mod: TC,RT

## 2019-02-25 PROCEDURE — 99999 PR PBB SHADOW E&M-EST. PATIENT-LVL III: CPT | Mod: PBBFAC,,, | Performed by: PHYSICIAN ASSISTANT

## 2019-02-25 PROCEDURE — 99499 NO LOS: ICD-10-PCS | Mod: S$GLB,,, | Performed by: PHYSICIAN ASSISTANT

## 2019-02-25 PROCEDURE — 99999 PR PBB SHADOW E&M-EST. PATIENT-LVL III: ICD-10-PCS | Mod: PBBFAC,,, | Performed by: PHYSICIAN ASSISTANT

## 2019-02-25 NOTE — PROGRESS NOTES
Letty Krueger is a 74 y.o. year old here today for a pre-operative visit in preparation for a Right total hip arthroplasty to be performed by  Dr. Aguilar on 3/7/2019.  she was last seen and treated in the clinic on 12/26/2018. she will be medically optimized by the pre op center. There has been no significant change in medical status since last visit. No fever, chills, malaise, or unexplained weight change.      Allergies, Medications, past medical and surgical history reviewed.    Focused examination performed.    Patient declined to see Dr. Aguilar today in clinic. All questions answered. Patient encouraged to call with questions. Contact information given.     Pre, blas, and post operative procedures and expectations discussed. Questions were answered. Letty Krueger has been educated and is ready to proceed with surgery. Approximately 30 minutes was spent discussing surgical outcomes, plans, procedures pre, blas, and post operative expections and care.  Surgical consent signed.    Letty Krueger will contact us if there are any questions, concerns, or changes in medical status prior to surgery.

## 2019-02-27 RX ORDER — MORPHINE SULFATE 10 MG/ML
2 INJECTION, SOLUTION INTRAMUSCULAR; INTRAVENOUS
Status: CANCELLED | OUTPATIENT
Start: 2019-02-27

## 2019-02-27 RX ORDER — FAMOTIDINE 20 MG/1
20 TABLET, FILM COATED ORAL 2 TIMES DAILY
Status: CANCELLED | OUTPATIENT
Start: 2019-02-27

## 2019-02-27 RX ORDER — LIDOCAINE HYDROCHLORIDE 10 MG/ML
1 INJECTION, SOLUTION EPIDURAL; INFILTRATION; INTRACAUDAL; PERINEURAL
Status: CANCELLED | OUTPATIENT
Start: 2019-02-27

## 2019-02-27 RX ORDER — ACETAMINOPHEN 10 MG/ML
1000 INJECTION, SOLUTION INTRAVENOUS ONCE
Status: CANCELLED | OUTPATIENT
Start: 2019-02-27 | End: 2019-02-27

## 2019-02-27 RX ORDER — NALOXONE HCL 0.4 MG/ML
0.02 VIAL (ML) INJECTION
Status: CANCELLED | OUTPATIENT
Start: 2019-02-27 | End: 2019-03-02

## 2019-02-27 RX ORDER — POLYETHYLENE GLYCOL 3350 17 G/17G
17 POWDER, FOR SOLUTION ORAL DAILY
Status: CANCELLED | OUTPATIENT
Start: 2019-02-27

## 2019-02-27 RX ORDER — MUPIROCIN 20 MG/G
1 OINTMENT TOPICAL 2 TIMES DAILY
Status: CANCELLED | OUTPATIENT
Start: 2019-02-27 | End: 2019-03-04

## 2019-02-27 RX ORDER — AMOXICILLIN 250 MG
1 CAPSULE ORAL 2 TIMES DAILY
Status: CANCELLED | OUTPATIENT
Start: 2019-02-27

## 2019-02-27 RX ORDER — PREGABALIN 25 MG/1
75 CAPSULE ORAL NIGHTLY
Status: CANCELLED | OUTPATIENT
Start: 2019-02-27

## 2019-02-27 RX ORDER — SODIUM CHLORIDE 9 MG/ML
INJECTION, SOLUTION INTRAVENOUS CONTINUOUS
Status: CANCELLED | OUTPATIENT
Start: 2019-02-27 | End: 2019-02-28

## 2019-02-27 RX ORDER — RAMELTEON 8 MG/1
8 TABLET ORAL NIGHTLY PRN
Status: CANCELLED | OUTPATIENT
Start: 2019-02-27

## 2019-02-27 RX ORDER — PREGABALIN 25 MG/1
75 CAPSULE ORAL
Status: CANCELLED | OUTPATIENT
Start: 2019-02-27

## 2019-02-27 RX ORDER — CELECOXIB 100 MG/1
400 CAPSULE ORAL
Status: CANCELLED | OUTPATIENT
Start: 2019-02-27

## 2019-02-27 RX ORDER — OXYCODONE HYDROCHLORIDE 5 MG/1
10 TABLET ORAL
Status: CANCELLED | OUTPATIENT
Start: 2019-02-27

## 2019-02-27 RX ORDER — SODIUM CHLORIDE 9 MG/ML
INJECTION, SOLUTION INTRAVENOUS
Status: CANCELLED | OUTPATIENT
Start: 2019-02-27

## 2019-02-27 RX ORDER — ASPIRIN 81 MG/1
81 TABLET ORAL 2 TIMES DAILY
Status: CANCELLED | OUTPATIENT
Start: 2019-02-27

## 2019-02-27 RX ORDER — TAMSULOSIN HYDROCHLORIDE 0.4 MG/1
0.4 CAPSULE ORAL DAILY
Status: CANCELLED | OUTPATIENT
Start: 2019-02-27 | End: 2019-03-01

## 2019-02-27 RX ORDER — OXYCODONE HYDROCHLORIDE 5 MG/1
5 TABLET ORAL
Status: CANCELLED | OUTPATIENT
Start: 2019-02-27

## 2019-02-27 RX ORDER — MUPIROCIN 20 MG/G
1 OINTMENT TOPICAL
Status: CANCELLED | OUTPATIENT
Start: 2019-02-27

## 2019-02-27 RX ORDER — ACETAMINOPHEN 500 MG
1000 TABLET ORAL EVERY 6 HOURS
Status: CANCELLED | OUTPATIENT
Start: 2019-02-27 | End: 2019-03-01

## 2019-02-27 RX ORDER — OXYCODONE HYDROCHLORIDE 5 MG/1
15 TABLET ORAL
Status: CANCELLED | OUTPATIENT
Start: 2019-02-27

## 2019-02-27 RX ORDER — BISACODYL 10 MG
10 SUPPOSITORY, RECTAL RECTAL EVERY 12 HOURS PRN
Status: CANCELLED | OUTPATIENT
Start: 2019-02-27

## 2019-02-27 RX ORDER — CELECOXIB 100 MG/1
200 CAPSULE ORAL DAILY
Status: CANCELLED | OUTPATIENT
Start: 2019-02-27

## 2019-02-27 RX ORDER — ONDANSETRON 2 MG/ML
4 INJECTION INTRAMUSCULAR; INTRAVENOUS EVERY 8 HOURS PRN
Status: CANCELLED | OUTPATIENT
Start: 2019-02-27

## 2019-02-27 NOTE — H&P
CC: Right hip pain    Letty Krueger is a 74 y.o. female with 2 year history of Right hip pain.  Pain is worse with activity and weight bearing.  Patient has experienced interference of activities of daily living due to increased pain and decreased range of motion. Patient has failed non-operative treatment including NSAIDs, as well as greater than 3 months of activity modification. Letty Krueger ambulates using assistive device.     Relevant medical conditions of significance in perioperative period:  HTN: taking lisinopril- HCTZ followed by PCP   Hyperlipidemia: on rosuvastatin    Past Medical History:   Diagnosis Date    Arthritis     DDD (degenerative disc disease), lumbar 9/6/2018    Hyperlipidemia     Hypertension        Past Surgical History:   Procedure Laterality Date    eye lid surgery - Clint syndrome      Injection,steroid,epidural,transforaminal approach   Bilateral L3 Bilateral 10/1/2018    Performed by [facility] at [facility]    Injection,steroid,epidural,transforaminal approach  L4 Bilateral 9/6/2018    Performed by Terri Wolf MD at [facility]       Family History   Problem Relation Age of Onset    Diabetes Mother     Diabetes Father        Review of patient's allergies indicates:  No Known Allergies      Current Outpatient Medications:     cyanocobalamin (VITAMIN B-12) 1000 MCG tablet, Take 1,000 mcg by mouth once daily. , Disp: , Rfl:     gabapentin (NEURONTIN) 400 MG capsule, gabapentin 400 mg capsule, Disp: , Rfl:     lisinopril-hydrochlorothiazide (PRINZIDE,ZESTORETIC) 20-25 mg Tab, lisinopril 20 mg-hydrochlorothiazide 25 mg tablet- 1 tablet oral daily, Disp: , Rfl:     multivitamin capsule, Take 1 capsule by mouth once daily., Disp: , Rfl:     rosuvastatin (CRESTOR) 10 MG tablet, Take 10 mg by mouth once daily. , Disp: , Rfl:     Review of Systems:   Constitutional: no fever or chills  Eyes: no visual changes  ENT: no nasal congestion or sore  "throat  Respiratory: no cough or shortness of breath  Cardiovascular: no chest pain or palpitations  Gastrointestinal: no nausea or vomiting, tolerating diet  Genitourinary: no hematuria or dysuria  Integument/Breast: no rash or pruritis  Hematologic/Lymphatic: no easy bruising or lymphadenopathy  Musculoskeletal: positive for hip pain  Neurological: no seizures or tremors  Behavioral/Psych: no auditory or visual hallucinations  Endocrine: no heat or cold intolerance    PE:  Ht 5' 2" (1.575 m)   Wt 83.6 kg (184 lb 4.9 oz)   BMI 33.71 kg/m²   General: Pleasant, cooperative, NAD   Gait: antalgic  HEENT: NCAT, sclera nonicteric   Lungs: Respirations are clear, equal and unlabored.   CV: S1S2; 2+ bilateral upper and lower extremity pulses.   Skin: Intact throughout LE with no rashes, erythema, or lesions  Extremities: No LE edema, NVI lower extremities    Right Hip Exam:  100 degrees flexion  0 degrees extension   10 degrees internal rotation  30 degrees external rotation  25 degrees abduction  20 degrees adduction  There is pain with passive range of motion.     Radiographs: Radiographs reveal advanced degenerative changes including subchondral cyst formation, subchondral sclerosis, osteophyte formation, joint space narrowing.     Diagnosis: osteoarthritis Right hip    Plan: Right total hip arthroplasty     Due to the serious nature of total joint infection and the high prevalence of community acquired MRSA, vancomycin will be used perioperatively.              "

## 2019-02-27 NOTE — H&P (VIEW-ONLY)
CC: Right hip pain    Letty Krueger is a 74 y.o. female with 2 year history of Right hip pain.  Pain is worse with activity and weight bearing.  Patient has experienced interference of activities of daily living due to increased pain and decreased range of motion. Patient has failed non-operative treatment including NSAIDs, as well as greater than 3 months of activity modification. Letty Krueger ambulates using assistive device.     Relevant medical conditions of significance in perioperative period:  HTN: taking lisinopril- HCTZ followed by PCP   Hyperlipidemia: on rosuvastatin    Past Medical History:   Diagnosis Date    Arthritis     DDD (degenerative disc disease), lumbar 9/6/2018    Hyperlipidemia     Hypertension        Past Surgical History:   Procedure Laterality Date    eye lid surgery - Clint syndrome      Injection,steroid,epidural,transforaminal approach   Bilateral L3 Bilateral 10/1/2018    Performed by [facility]ri [facility] at [facility]    Injection,steroid,epidural,transforaminal approach  L4 Bilateral 9/6/2018    Performed by [facility]ri Wolf [facility] at [facility]       Family History   Problem Relation Age of Onset    Diabetes Mother     Diabetes Father        Review of patient's allergies indicates:  No Known Allergies      Current Outpatient Medications:     cyanocobalamin (VITAMIN B-12) 1000 MCG tablet, Take 1,000 mcg by mouth once daily. , Disp: , Rfl:     gabapentin (NEURONTIN) 400 MG capsule, gabapentin 400 mg capsule, Disp: , Rfl:     lisinopril-hydrochlorothiazide (PRINZIDE,ZESTORETIC) 20-25 mg Tab, lisinopril 20 mg-hydrochlorothiazide 25 mg tablet- 1 tablet oral daily, Disp: , Rfl:     multivitamin capsule, Take 1 capsule by mouth once daily., Disp: , Rfl:     rosuvastatin (CRESTOR) 10 MG tablet, Take 10 mg by mouth once daily. , Disp: , Rfl:     Review of Systems:   Constitutional: no fever or chills  Eyes: no visual changes  ENT: no nasal congestion or sore  "throat  Respiratory: no cough or shortness of breath  Cardiovascular: no chest pain or palpitations  Gastrointestinal: no nausea or vomiting, tolerating diet  Genitourinary: no hematuria or dysuria  Integument/Breast: no rash or pruritis  Hematologic/Lymphatic: no easy bruising or lymphadenopathy  Musculoskeletal: positive for hip pain  Neurological: no seizures or tremors  Behavioral/Psych: no auditory or visual hallucinations  Endocrine: no heat or cold intolerance    PE:  Ht 5' 2" (1.575 m)   Wt 83.6 kg (184 lb 4.9 oz)   BMI 33.71 kg/m²   General: Pleasant, cooperative, NAD   Gait: antalgic  HEENT: NCAT, sclera nonicteric   Lungs: Respirations are clear, equal and unlabored.   CV: S1S2; 2+ bilateral upper and lower extremity pulses.   Skin: Intact throughout LE with no rashes, erythema, or lesions  Extremities: No LE edema, NVI lower extremities    Right Hip Exam:  100 degrees flexion  0 degrees extension   10 degrees internal rotation  30 degrees external rotation  25 degrees abduction  20 degrees adduction  There is pain with passive range of motion.     Radiographs: Radiographs reveal advanced degenerative changes including subchondral cyst formation, subchondral sclerosis, osteophyte formation, joint space narrowing.     Diagnosis: osteoarthritis Right hip    Plan: Right total hip arthroplasty     Due to the serious nature of total joint infection and the high prevalence of community acquired MRSA, vancomycin will be used perioperatively.              "

## 2019-03-06 ENCOUNTER — TELEPHONE (OUTPATIENT)
Dept: ORTHOPEDICS | Facility: CLINIC | Age: 74
End: 2019-03-06

## 2019-03-06 NOTE — TELEPHONE ENCOUNTER
Spoke with pt notified her of her 1030 arrival time for surgery tomorrow on the second floor. Pt verbalized understanding and had no further questions.

## 2019-03-06 NOTE — TELEPHONE ENCOUNTER
Spoke with pt, she wanted to know if she would be able to go home in a smaller car than she arrives in. I notified her that it shouldn't be a problem as long as she fits comfortably in it. Pt verbalized understanding and had no further questions.    ----- Message from Adelina Guan sent at 3/6/2019  3:31 PM CST -----  Contact: self  Pt is calling in regards to questions she has about her appt on tomorrow.     She can be reached at [phone].    Thank you

## 2019-03-07 ENCOUNTER — ANESTHESIA (OUTPATIENT)
Dept: SURGERY | Facility: HOSPITAL | Age: 74
DRG: 470 | End: 2019-03-07
Payer: MEDICARE

## 2019-03-07 ENCOUNTER — HOSPITAL ENCOUNTER (INPATIENT)
Facility: HOSPITAL | Age: 74
LOS: 1 days | Discharge: HOME HEALTH | DRG: 470 | End: 2019-03-08
Attending: ORTHOPAEDIC SURGERY | Admitting: ORTHOPAEDIC SURGERY
Payer: MEDICARE

## 2019-03-07 DIAGNOSIS — M16.11 PRIMARY OSTEOARTHRITIS OF RIGHT HIP: ICD-10-CM

## 2019-03-07 LAB
ABO + RH BLD: NORMAL
ANION GAP SERPL CALC-SCNC: 6 MMOL/L
ANION GAP SERPL CALC-SCNC: 6 MMOL/L
BASOPHILS # BLD AUTO: 0.06 K/UL
BASOPHILS NFR BLD: 0.5 %
BLD GP AB SCN CELLS X3 SERPL QL: NORMAL
BUN SERPL-MCNC: 13 MG/DL
BUN SERPL-MCNC: 13 MG/DL
CALCIUM SERPL-MCNC: 8.1 MG/DL
CALCIUM SERPL-MCNC: 8.1 MG/DL
CHLORIDE SERPL-SCNC: 106 MMOL/L
CHLORIDE SERPL-SCNC: 106 MMOL/L
CO2 SERPL-SCNC: 24 MMOL/L
CO2 SERPL-SCNC: 24 MMOL/L
CREAT SERPL-MCNC: 0.7 MG/DL
CREAT SERPL-MCNC: 0.7 MG/DL
DIFFERENTIAL METHOD: ABNORMAL
EOSINOPHIL # BLD AUTO: 0 K/UL
EOSINOPHIL NFR BLD: 0.4 %
ERYTHROCYTE [DISTWIDTH] IN BLOOD BY AUTOMATED COUNT: 13 %
EST. GFR  (AFRICAN AMERICAN): >60 ML/MIN/1.73 M^2
EST. GFR  (AFRICAN AMERICAN): >60 ML/MIN/1.73 M^2
EST. GFR  (NON AFRICAN AMERICAN): >60 ML/MIN/1.73 M^2
EST. GFR  (NON AFRICAN AMERICAN): >60 ML/MIN/1.73 M^2
GLUCOSE SERPL-MCNC: 117 MG/DL
GLUCOSE SERPL-MCNC: 117 MG/DL
HCT VFR BLD AUTO: 35.9 %
HGB BLD-MCNC: 11.5 G/DL
IMM GRANULOCYTES # BLD AUTO: 0.15 K/UL
IMM GRANULOCYTES NFR BLD AUTO: 1.3 %
LYMPHOCYTES # BLD AUTO: 1.7 K/UL
LYMPHOCYTES NFR BLD: 15.1 %
MCH RBC QN AUTO: 29.3 PG
MCHC RBC AUTO-ENTMCNC: 32 G/DL
MCV RBC AUTO: 91 FL
MONOCYTES # BLD AUTO: 0.4 K/UL
MONOCYTES NFR BLD: 3.6 %
NEUTROPHILS # BLD AUTO: 8.9 K/UL
NEUTROPHILS NFR BLD: 79.1 %
NRBC BLD-RTO: 0 /100 WBC
PLATELET # BLD AUTO: 208 K/UL
PMV BLD AUTO: 11.8 FL
POTASSIUM SERPL-SCNC: 3.9 MMOL/L
POTASSIUM SERPL-SCNC: 3.9 MMOL/L
RBC # BLD AUTO: 3.93 M/UL
SODIUM SERPL-SCNC: 136 MMOL/L
SODIUM SERPL-SCNC: 136 MMOL/L
WBC # BLD AUTO: 11.29 K/UL

## 2019-03-07 PROCEDURE — 37000008 HC ANESTHESIA 1ST 15 MINUTES: Performed by: ORTHOPAEDIC SURGERY

## 2019-03-07 PROCEDURE — 85025 COMPLETE CBC W/AUTO DIFF WBC: CPT

## 2019-03-07 PROCEDURE — 27130 TOTAL HIP ARTHROPLASTY: CPT | Mod: RT,,, | Performed by: ORTHOPAEDIC SURGERY

## 2019-03-07 PROCEDURE — 20985 PR CPTR-ASST SURGICAL NAVIGATION IMAGE-LESS: ICD-10-PCS | Mod: ,,, | Performed by: ORTHOPAEDIC SURGERY

## 2019-03-07 PROCEDURE — 88304 TISSUE EXAM BY PATHOLOGIST: CPT | Mod: 26,,, | Performed by: PATHOLOGY

## 2019-03-07 PROCEDURE — 88304 TISSUE EXAM BY PATHOLOGIST: CPT | Performed by: PATHOLOGY

## 2019-03-07 PROCEDURE — 88311 TISSUE SPECIMEN TO PATHOLOGY - SURGERY: ICD-10-PCS | Mod: 26,,, | Performed by: PATHOLOGY

## 2019-03-07 PROCEDURE — 63600175 PHARM REV CODE 636 W HCPCS: Performed by: ANESTHESIOLOGY

## 2019-03-07 PROCEDURE — S0020 INJECTION, BUPIVICAINE HYDRO: HCPCS | Performed by: STUDENT IN AN ORGANIZED HEALTH CARE EDUCATION/TRAINING PROGRAM

## 2019-03-07 PROCEDURE — 25000003 PHARM REV CODE 250: Performed by: STUDENT IN AN ORGANIZED HEALTH CARE EDUCATION/TRAINING PROGRAM

## 2019-03-07 PROCEDURE — 25000003 PHARM REV CODE 250: Performed by: PHYSICIAN ASSISTANT

## 2019-03-07 PROCEDURE — D9220A PRA ANESTHESIA: Mod: ANES,,, | Performed by: ANESTHESIOLOGY

## 2019-03-07 PROCEDURE — 11000001 HC ACUTE MED/SURG PRIVATE ROOM

## 2019-03-07 PROCEDURE — 64450 NJX AA&/STRD OTHER PN/BRANCH: CPT | Mod: 59,RT,, | Performed by: ANESTHESIOLOGY

## 2019-03-07 PROCEDURE — D9220A PRA ANESTHESIA: ICD-10-PCS | Mod: ANES,,, | Performed by: ANESTHESIOLOGY

## 2019-03-07 PROCEDURE — 27200750 HC INSULATED NEEDLE/ STIMUPLEX: Performed by: STUDENT IN AN ORGANIZED HEALTH CARE EDUCATION/TRAINING PROGRAM

## 2019-03-07 PROCEDURE — 27130 PR TOTAL HIP ARTHROPLASTY: ICD-10-PCS | Mod: RT,,, | Performed by: ORTHOPAEDIC SURGERY

## 2019-03-07 PROCEDURE — 36000710: Performed by: ORTHOPAEDIC SURGERY

## 2019-03-07 PROCEDURE — C1713 ANCHOR/SCREW BN/BN,TIS/BN: HCPCS | Performed by: ORTHOPAEDIC SURGERY

## 2019-03-07 PROCEDURE — 63600175 PHARM REV CODE 636 W HCPCS: Performed by: PHYSICIAN ASSISTANT

## 2019-03-07 PROCEDURE — C1776 JOINT DEVICE (IMPLANTABLE): HCPCS | Performed by: ORTHOPAEDIC SURGERY

## 2019-03-07 PROCEDURE — 64450 PENG: ICD-10-PCS | Mod: 59,RT,, | Performed by: ANESTHESIOLOGY

## 2019-03-07 PROCEDURE — 27130 TOTAL HIP ARTHROPLASTY: CPT | Mod: AS,RT,, | Performed by: PHYSICIAN ASSISTANT

## 2019-03-07 PROCEDURE — 76942 ECHO GUIDE FOR BIOPSY: CPT | Mod: 26,,, | Performed by: ANESTHESIOLOGY

## 2019-03-07 PROCEDURE — 86901 BLOOD TYPING SEROLOGIC RH(D): CPT

## 2019-03-07 PROCEDURE — 76942 ECHO GUIDE FOR BIOPSY: CPT | Performed by: ANESTHESIOLOGY

## 2019-03-07 PROCEDURE — 88311 DECALCIFY TISSUE: CPT | Mod: 26,,, | Performed by: PATHOLOGY

## 2019-03-07 PROCEDURE — 94761 N-INVAS EAR/PLS OXIMETRY MLT: CPT

## 2019-03-07 PROCEDURE — 63600175 PHARM REV CODE 636 W HCPCS: Performed by: NURSE ANESTHETIST, CERTIFIED REGISTERED

## 2019-03-07 PROCEDURE — 36000711: Performed by: ORTHOPAEDIC SURGERY

## 2019-03-07 PROCEDURE — 80048 BASIC METABOLIC PNL TOTAL CA: CPT

## 2019-03-07 PROCEDURE — D9220A PRA ANESTHESIA: ICD-10-PCS | Mod: CRNA,,, | Performed by: NURSE ANESTHETIST, CERTIFIED REGISTERED

## 2019-03-07 PROCEDURE — 71000039 HC RECOVERY, EACH ADD'L HOUR: Performed by: ORTHOPAEDIC SURGERY

## 2019-03-07 PROCEDURE — D9220A PRA ANESTHESIA: Mod: CRNA,,, | Performed by: NURSE ANESTHETIST, CERTIFIED REGISTERED

## 2019-03-07 PROCEDURE — 97116 GAIT TRAINING THERAPY: CPT

## 2019-03-07 PROCEDURE — 76942 ECHO GUIDE FOR BIOPSY: CPT | Performed by: STUDENT IN AN ORGANIZED HEALTH CARE EDUCATION/TRAINING PROGRAM

## 2019-03-07 PROCEDURE — 25000003 PHARM REV CODE 250: Performed by: NURSE ANESTHETIST, CERTIFIED REGISTERED

## 2019-03-07 PROCEDURE — 27130 PR TOTAL HIP ARTHROPLASTY: ICD-10-PCS | Mod: AS,RT,, | Performed by: PHYSICIAN ASSISTANT

## 2019-03-07 PROCEDURE — 63600175 PHARM REV CODE 636 W HCPCS: Performed by: STUDENT IN AN ORGANIZED HEALTH CARE EDUCATION/TRAINING PROGRAM

## 2019-03-07 PROCEDURE — 88304 TISSUE SPECIMEN TO PATHOLOGY - SURGERY: ICD-10-PCS | Mod: 26,,, | Performed by: PATHOLOGY

## 2019-03-07 PROCEDURE — 36415 COLL VENOUS BLD VENIPUNCTURE: CPT

## 2019-03-07 PROCEDURE — 20985 CPTR-ASST DIR MS PX: CPT | Mod: ,,, | Performed by: ORTHOPAEDIC SURGERY

## 2019-03-07 PROCEDURE — 97165 OT EVAL LOW COMPLEX 30 MIN: CPT

## 2019-03-07 PROCEDURE — 25000003 PHARM REV CODE 250

## 2019-03-07 PROCEDURE — 27201423 OPTIME MED/SURG SUP & DEVICES STERILE SUPPLY: Performed by: ORTHOPAEDIC SURGERY

## 2019-03-07 PROCEDURE — 76942 PENG: ICD-10-PCS | Mod: 26,,, | Performed by: ANESTHESIOLOGY

## 2019-03-07 PROCEDURE — 97530 THERAPEUTIC ACTIVITIES: CPT

## 2019-03-07 PROCEDURE — 37000009 HC ANESTHESIA EA ADD 15 MINS: Performed by: ORTHOPAEDIC SURGERY

## 2019-03-07 PROCEDURE — 97161 PT EVAL LOW COMPLEX 20 MIN: CPT

## 2019-03-07 PROCEDURE — 71000033 HC RECOVERY, INTIAL HOUR: Performed by: ORTHOPAEDIC SURGERY

## 2019-03-07 DEVICE — INSERT ACE 0DEG 36MM SZ E X3: Type: IMPLANTABLE DEVICE | Site: HIP | Status: FUNCTIONAL

## 2019-03-07 DEVICE — STEM ACC II 27 DEG SZ 5: Type: IMPLANTABLE DEVICE | Site: HIP | Status: FUNCTIONAL

## 2019-03-07 DEVICE — IMPLANTABLE DEVICE: Type: IMPLANTABLE DEVICE | Site: HIP | Status: FUNCTIONAL

## 2019-03-07 DEVICE — SCREW BONE 25MM: Type: IMPLANTABLE DEVICE | Site: HIP | Status: FUNCTIONAL

## 2019-03-07 DEVICE — SHELL ACE CLUSTER 54MM TTNM: Type: IMPLANTABLE DEVICE | Site: HIP | Status: FUNCTIONAL

## 2019-03-07 RX ORDER — TAMSULOSIN HYDROCHLORIDE 0.4 MG/1
0.4 CAPSULE ORAL DAILY
Status: COMPLETED | OUTPATIENT
Start: 2019-03-07 | End: 2019-03-08

## 2019-03-07 RX ORDER — CEFAZOLIN SODIUM 1 G/3ML
INJECTION, POWDER, FOR SOLUTION INTRAMUSCULAR; INTRAVENOUS
Status: DISCONTINUED | OUTPATIENT
Start: 2019-03-07 | End: 2019-03-07

## 2019-03-07 RX ORDER — AMOXICILLIN 250 MG
1 CAPSULE ORAL 2 TIMES DAILY
Status: DISCONTINUED | OUTPATIENT
Start: 2019-03-07 | End: 2019-03-08 | Stop reason: HOSPADM

## 2019-03-07 RX ORDER — CELECOXIB 200 MG/1
400 CAPSULE ORAL
Status: COMPLETED | OUTPATIENT
Start: 2019-03-07 | End: 2019-03-07

## 2019-03-07 RX ORDER — MORPHINE SULFATE 2 MG/ML
2 INJECTION, SOLUTION INTRAMUSCULAR; INTRAVENOUS
Status: DISCONTINUED | OUTPATIENT
Start: 2019-03-07 | End: 2019-03-08 | Stop reason: HOSPADM

## 2019-03-07 RX ORDER — GLYCOPYRROLATE 0.2 MG/ML
INJECTION INTRAMUSCULAR; INTRAVENOUS
Status: DISCONTINUED | OUTPATIENT
Start: 2019-03-07 | End: 2019-03-07

## 2019-03-07 RX ORDER — BUPIVACAINE HYDROCHLORIDE 7.5 MG/ML
INJECTION, SOLUTION EPIDURAL; RETROBULBAR
Status: COMPLETED | OUTPATIENT
Start: 2019-03-07 | End: 2019-03-07

## 2019-03-07 RX ORDER — MIDAZOLAM HYDROCHLORIDE 1 MG/ML
0.5 INJECTION INTRAMUSCULAR; INTRAVENOUS
Status: DISCONTINUED | OUTPATIENT
Start: 2019-03-07 | End: 2019-03-07 | Stop reason: HOSPADM

## 2019-03-07 RX ORDER — FENTANYL CITRATE 50 UG/ML
25 INJECTION, SOLUTION INTRAMUSCULAR; INTRAVENOUS EVERY 5 MIN PRN
Status: DISCONTINUED | OUTPATIENT
Start: 2019-03-07 | End: 2019-03-07 | Stop reason: HOSPADM

## 2019-03-07 RX ORDER — LIDOCAINE HYDROCHLORIDE 10 MG/ML
1 INJECTION, SOLUTION EPIDURAL; INFILTRATION; INTRACAUDAL; PERINEURAL
Status: DISCONTINUED | OUTPATIENT
Start: 2019-03-07 | End: 2019-03-07 | Stop reason: HOSPADM

## 2019-03-07 RX ORDER — METOPROLOL TARTRATE 1 MG/ML
INJECTION, SOLUTION INTRAVENOUS
Status: DISCONTINUED | OUTPATIENT
Start: 2019-03-07 | End: 2019-03-07

## 2019-03-07 RX ORDER — BISACODYL 10 MG
10 SUPPOSITORY, RECTAL RECTAL EVERY 12 HOURS PRN
Status: DISCONTINUED | OUTPATIENT
Start: 2019-03-07 | End: 2019-03-08 | Stop reason: HOSPADM

## 2019-03-07 RX ORDER — PREGABALIN 75 MG/1
75 CAPSULE ORAL NIGHTLY
Status: DISCONTINUED | OUTPATIENT
Start: 2019-03-07 | End: 2019-03-08 | Stop reason: HOSPADM

## 2019-03-07 RX ORDER — ACETAMINOPHEN 500 MG
1000 TABLET ORAL EVERY 6 HOURS
Status: DISCONTINUED | OUTPATIENT
Start: 2019-03-07 | End: 2019-03-08 | Stop reason: HOSPADM

## 2019-03-07 RX ORDER — PROPOFOL 10 MG/ML
VIAL (ML) INTRAVENOUS CONTINUOUS PRN
Status: DISCONTINUED | OUTPATIENT
Start: 2019-03-07 | End: 2019-03-07

## 2019-03-07 RX ORDER — SODIUM CHLORIDE 0.9 % (FLUSH) 0.9 %
3 SYRINGE (ML) INJECTION
Status: DISCONTINUED | OUTPATIENT
Start: 2019-03-07 | End: 2019-03-07 | Stop reason: HOSPADM

## 2019-03-07 RX ORDER — OXYCODONE HYDROCHLORIDE 5 MG/1
10 TABLET ORAL
Status: DISCONTINUED | OUTPATIENT
Start: 2019-03-07 | End: 2019-03-07

## 2019-03-07 RX ORDER — ACETAMINOPHEN 10 MG/ML
1000 INJECTION, SOLUTION INTRAVENOUS ONCE
Status: DISCONTINUED | OUTPATIENT
Start: 2019-03-07 | End: 2019-03-08 | Stop reason: HOSPADM

## 2019-03-07 RX ORDER — ONDANSETRON 2 MG/ML
4 INJECTION INTRAMUSCULAR; INTRAVENOUS ONCE AS NEEDED
Status: DISCONTINUED | OUTPATIENT
Start: 2019-03-07 | End: 2019-03-07 | Stop reason: HOSPADM

## 2019-03-07 RX ORDER — SODIUM CHLORIDE 9 MG/ML
INJECTION, SOLUTION INTRAVENOUS CONTINUOUS PRN
Status: DISCONTINUED | OUTPATIENT
Start: 2019-03-07 | End: 2019-03-07

## 2019-03-07 RX ORDER — ASPIRIN 81 MG/1
81 TABLET ORAL 2 TIMES DAILY
Status: DISCONTINUED | OUTPATIENT
Start: 2019-03-07 | End: 2019-03-08 | Stop reason: HOSPADM

## 2019-03-07 RX ORDER — KETAMINE HCL IN 0.9 % NACL 50 MG/5 ML
SYRINGE (ML) INTRAVENOUS
Status: DISCONTINUED | OUTPATIENT
Start: 2019-03-07 | End: 2019-03-07

## 2019-03-07 RX ORDER — SODIUM CHLORIDE 9 MG/ML
INJECTION, SOLUTION INTRAVENOUS
Status: COMPLETED | OUTPATIENT
Start: 2019-03-07 | End: 2019-03-07

## 2019-03-07 RX ORDER — MIDAZOLAM HYDROCHLORIDE 1 MG/ML
INJECTION, SOLUTION INTRAMUSCULAR; INTRAVENOUS
Status: DISCONTINUED | OUTPATIENT
Start: 2019-03-07 | End: 2019-03-07

## 2019-03-07 RX ORDER — OXYCODONE HYDROCHLORIDE 5 MG/1
TABLET ORAL
Status: COMPLETED
Start: 2019-03-07 | End: 2019-03-07

## 2019-03-07 RX ORDER — MUPIROCIN 20 MG/G
1 OINTMENT TOPICAL 2 TIMES DAILY
Status: DISCONTINUED | OUTPATIENT
Start: 2019-03-08 | End: 2019-03-08 | Stop reason: HOSPADM

## 2019-03-07 RX ORDER — NALOXONE HCL 0.4 MG/ML
0.02 VIAL (ML) INJECTION
Status: DISCONTINUED | OUTPATIENT
Start: 2019-03-07 | End: 2019-03-08 | Stop reason: HOSPADM

## 2019-03-07 RX ORDER — OXYCODONE HYDROCHLORIDE 5 MG/1
15 TABLET ORAL
Status: DISCONTINUED | OUTPATIENT
Start: 2019-03-07 | End: 2019-03-07

## 2019-03-07 RX ORDER — SODIUM CHLORIDE 9 MG/ML
INJECTION, SOLUTION INTRAVENOUS CONTINUOUS
Status: DISCONTINUED | OUTPATIENT
Start: 2019-03-07 | End: 2019-03-08 | Stop reason: HOSPADM

## 2019-03-07 RX ORDER — VANCOMYCIN HYDROCHLORIDE
1500
Status: COMPLETED | OUTPATIENT
Start: 2019-03-07 | End: 2019-03-07

## 2019-03-07 RX ORDER — PHENYLEPHRINE HYDROCHLORIDE 10 MG/ML
INJECTION INTRAVENOUS
Status: DISCONTINUED | OUTPATIENT
Start: 2019-03-07 | End: 2019-03-07

## 2019-03-07 RX ORDER — ROSUVASTATIN CALCIUM 10 MG/1
10 TABLET, COATED ORAL DAILY
Status: DISCONTINUED | OUTPATIENT
Start: 2019-03-08 | End: 2019-03-08 | Stop reason: HOSPADM

## 2019-03-07 RX ORDER — VASOPRESSIN 20 [USP'U]/ML
INJECTION, SOLUTION INTRAMUSCULAR; SUBCUTANEOUS
Status: DISCONTINUED | OUTPATIENT
Start: 2019-03-07 | End: 2019-03-07

## 2019-03-07 RX ORDER — CEFAZOLIN SODIUM 1 G/3ML
2 INJECTION, POWDER, FOR SOLUTION INTRAMUSCULAR; INTRAVENOUS
Status: COMPLETED | OUTPATIENT
Start: 2019-03-07 | End: 2019-03-08

## 2019-03-07 RX ORDER — FAMOTIDINE 20 MG/1
20 TABLET, FILM COATED ORAL 2 TIMES DAILY
Status: DISCONTINUED | OUTPATIENT
Start: 2019-03-07 | End: 2019-03-08

## 2019-03-07 RX ORDER — ASPIRIN 81 MG/1
81 TABLET ORAL 2 TIMES DAILY
Qty: 60 TABLET | Refills: 0 | Status: SHIPPED | OUTPATIENT
Start: 2019-03-07 | End: 2019-09-30

## 2019-03-07 RX ORDER — CELECOXIB 200 MG/1
200 CAPSULE ORAL DAILY
Status: DISCONTINUED | OUTPATIENT
Start: 2019-03-09 | End: 2019-03-08 | Stop reason: HOSPADM

## 2019-03-07 RX ORDER — EPHEDRINE SULFATE 50 MG/ML
INJECTION, SOLUTION INTRAVENOUS
Status: DISCONTINUED | OUTPATIENT
Start: 2019-03-07 | End: 2019-03-07

## 2019-03-07 RX ORDER — ONDANSETRON 2 MG/ML
4 INJECTION INTRAMUSCULAR; INTRAVENOUS EVERY 8 HOURS PRN
Status: DISCONTINUED | OUTPATIENT
Start: 2019-03-07 | End: 2019-03-08 | Stop reason: HOSPADM

## 2019-03-07 RX ORDER — MUPIROCIN 20 MG/G
1 OINTMENT TOPICAL
Status: COMPLETED | OUTPATIENT
Start: 2019-03-07 | End: 2019-03-07

## 2019-03-07 RX ORDER — CEFAZOLIN SODIUM 1 G/3ML
2 INJECTION, POWDER, FOR SOLUTION INTRAMUSCULAR; INTRAVENOUS
Status: DISCONTINUED | OUTPATIENT
Start: 2019-03-07 | End: 2019-03-07 | Stop reason: HOSPADM

## 2019-03-07 RX ORDER — PREGABALIN 75 MG/1
75 CAPSULE ORAL
Status: COMPLETED | OUTPATIENT
Start: 2019-03-07 | End: 2019-03-07

## 2019-03-07 RX ORDER — LIDOCAINE HCL/PF 100 MG/5ML
SYRINGE (ML) INTRAVENOUS
Status: DISCONTINUED | OUTPATIENT
Start: 2019-03-07 | End: 2019-03-07

## 2019-03-07 RX ORDER — LISINOPRIL AND HYDROCHLOROTHIAZIDE 10; 12.5 MG/1; MG/1
2 TABLET ORAL DAILY
Status: DISCONTINUED | OUTPATIENT
Start: 2019-03-08 | End: 2019-03-08 | Stop reason: HOSPADM

## 2019-03-07 RX ORDER — RAMELTEON 8 MG/1
8 TABLET ORAL NIGHTLY PRN
Status: DISCONTINUED | OUTPATIENT
Start: 2019-03-07 | End: 2019-03-08 | Stop reason: HOSPADM

## 2019-03-07 RX ORDER — POLYETHYLENE GLYCOL 3350 17 G/17G
17 POWDER, FOR SOLUTION ORAL DAILY
Status: DISCONTINUED | OUTPATIENT
Start: 2019-03-08 | End: 2019-03-08 | Stop reason: HOSPADM

## 2019-03-07 RX ORDER — FAMOTIDINE 20 MG/1
20 TABLET, FILM COATED ORAL 2 TIMES DAILY
Status: DISCONTINUED | OUTPATIENT
Start: 2019-03-07 | End: 2019-03-08 | Stop reason: HOSPADM

## 2019-03-07 RX ORDER — DOCUSATE SODIUM 100 MG/1
100 CAPSULE, LIQUID FILLED ORAL 2 TIMES DAILY PRN
Qty: 60 CAPSULE | Refills: 0 | Status: SHIPPED | OUTPATIENT
Start: 2019-03-07 | End: 2019-09-30

## 2019-03-07 RX ORDER — MUPIROCIN 20 MG/G
1 OINTMENT TOPICAL 2 TIMES DAILY
Status: DISCONTINUED | OUTPATIENT
Start: 2019-03-07 | End: 2019-03-08 | Stop reason: HOSPADM

## 2019-03-07 RX ORDER — ONDANSETRON 8 MG/1
8 TABLET, ORALLY DISINTEGRATING ORAL EVERY 12 HOURS PRN
Qty: 20 TABLET | Refills: 0 | Status: SHIPPED | OUTPATIENT
Start: 2019-03-07 | End: 2019-09-30

## 2019-03-07 RX ORDER — OXYCODONE HYDROCHLORIDE 5 MG/1
5 TABLET ORAL
Status: DISCONTINUED | OUTPATIENT
Start: 2019-03-07 | End: 2019-03-08 | Stop reason: HOSPADM

## 2019-03-07 RX ORDER — CEFAZOLIN SODIUM 1 G/3ML
2 INJECTION, POWDER, FOR SOLUTION INTRAMUSCULAR; INTRAVENOUS
Status: DISPENSED | OUTPATIENT
Start: 2019-03-07 | End: 2019-03-08

## 2019-03-07 RX ORDER — DEXAMETHASONE SODIUM PHOSPHATE 4 MG/ML
INJECTION, SOLUTION INTRA-ARTICULAR; INTRALESIONAL; INTRAMUSCULAR; INTRAVENOUS; SOFT TISSUE
Status: DISCONTINUED | OUTPATIENT
Start: 2019-03-07 | End: 2019-03-07

## 2019-03-07 RX ORDER — ACETAMINOPHEN 10 MG/ML
1000 INJECTION, SOLUTION INTRAVENOUS ONCE
Status: COMPLETED | OUTPATIENT
Start: 2019-03-07 | End: 2019-03-07

## 2019-03-07 RX ORDER — POLYETHYLENE GLYCOL 3350 17 G/17G
17 POWDER, FOR SOLUTION ORAL DAILY
Status: DISCONTINUED | OUTPATIENT
Start: 2019-03-07 | End: 2019-03-08 | Stop reason: HOSPADM

## 2019-03-07 RX ORDER — OXYCODONE AND ACETAMINOPHEN 5; 325 MG/1; MG/1
1 TABLET ORAL
Qty: 40 TABLET | Refills: 0 | Status: SHIPPED | OUTPATIENT
Start: 2019-03-07 | End: 2019-03-22 | Stop reason: SDUPTHER

## 2019-03-07 RX ADMIN — CEFAZOLIN 2 G: 330 INJECTION, POWDER, FOR SOLUTION INTRAMUSCULAR; INTRAVENOUS at 11:03

## 2019-03-07 RX ADMIN — SODIUM CHLORIDE, SODIUM GLUCONATE, SODIUM ACETATE, POTASSIUM CHLORIDE, MAGNESIUM CHLORIDE, SODIUM PHOSPHATE, DIBASIC, AND POTASSIUM PHOSPHATE: .53; .5; .37; .037; .03; .012; .00082 INJECTION, SOLUTION INTRAVENOUS at 01:03

## 2019-03-07 RX ADMIN — FENTANYL CITRATE 50 MCG: 50 INJECTION INTRAMUSCULAR; INTRAVENOUS at 11:03

## 2019-03-07 RX ADMIN — BUPIVACAINE HYDROCHLORIDE 10 ML: 7.5 INJECTION, SOLUTION EPIDURAL; RETROBULBAR at 11:03

## 2019-03-07 RX ADMIN — PHENYLEPHRINE HYDROCHLORIDE 200 MCG: 10 INJECTION INTRAVENOUS at 12:03

## 2019-03-07 RX ADMIN — OXYCODONE HYDROCHLORIDE 10 MG: 5 TABLET ORAL at 03:03

## 2019-03-07 RX ADMIN — FENTANYL CITRATE 25 MCG: 50 INJECTION INTRAMUSCULAR; INTRAVENOUS at 02:03

## 2019-03-07 RX ADMIN — SODIUM CHLORIDE: 0.9 INJECTION, SOLUTION INTRAVENOUS at 11:03

## 2019-03-07 RX ADMIN — OXYCODONE HYDROCHLORIDE 5 MG: 5 TABLET ORAL at 06:03

## 2019-03-07 RX ADMIN — MIDAZOLAM HYDROCHLORIDE 1 MG: 1 INJECTION, SOLUTION INTRAMUSCULAR; INTRAVENOUS at 12:03

## 2019-03-07 RX ADMIN — TAMSULOSIN HYDROCHLORIDE 0.4 MG: 0.4 CAPSULE ORAL at 03:03

## 2019-03-07 RX ADMIN — FAMOTIDINE 20 MG: 20 TABLET ORAL at 11:03

## 2019-03-07 RX ADMIN — DEXAMETHASONE SODIUM PHOSPHATE 8 MG: 4 INJECTION, SOLUTION INTRAMUSCULAR; INTRAVENOUS at 11:03

## 2019-03-07 RX ADMIN — MIDAZOLAM HYDROCHLORIDE 1 MG: 1 INJECTION, SOLUTION INTRAMUSCULAR; INTRAVENOUS at 11:03

## 2019-03-07 RX ADMIN — SODIUM CHLORIDE, SODIUM GLUCONATE, SODIUM ACETATE, POTASSIUM CHLORIDE, MAGNESIUM CHLORIDE, SODIUM PHOSPHATE, DIBASIC, AND POTASSIUM PHOSPHATE: .53; .5; .37; .037; .03; .012; .00082 INJECTION, SOLUTION INTRAVENOUS at 12:03

## 2019-03-07 RX ADMIN — MEPIVACAINE HYDROCHLORIDE 3 ML: 15 INJECTION, SOLUTION EPIDURAL; INFILTRATION at 12:03

## 2019-03-07 RX ADMIN — VANCOMYCIN HYDROCHLORIDE 1.5 G: 1 INJECTION, POWDER, LYOPHILIZED, FOR SOLUTION INTRAVENOUS at 11:03

## 2019-03-07 RX ADMIN — CEFAZOLIN 2 G: 330 INJECTION, POWDER, FOR SOLUTION INTRAMUSCULAR; INTRAVENOUS at 05:03

## 2019-03-07 RX ADMIN — PREGABALIN 75 MG: 75 CAPSULE ORAL at 11:03

## 2019-03-07 RX ADMIN — ASPIRIN 81 MG: 81 TABLET, COATED ORAL at 11:03

## 2019-03-07 RX ADMIN — SODIUM CHLORIDE: 0.9 INJECTION, SOLUTION INTRAVENOUS at 02:03

## 2019-03-07 RX ADMIN — ACETAMINOPHEN 1000 MG: 500 TABLET ORAL at 11:03

## 2019-03-07 RX ADMIN — MUPIROCIN 1 G: 20 OINTMENT TOPICAL at 11:03

## 2019-03-07 RX ADMIN — Medication 1500 MG: at 11:03

## 2019-03-07 RX ADMIN — Medication 10 MG: at 12:03

## 2019-03-07 RX ADMIN — METOPROLOL TARTRATE 1 MG: 5 INJECTION, SOLUTION INTRAVENOUS at 01:03

## 2019-03-07 RX ADMIN — ACETAMINOPHEN 1000 MG: 10 INJECTION, SOLUTION INTRAVENOUS at 03:03

## 2019-03-07 RX ADMIN — OXYCODONE HYDROCHLORIDE 5 MG: 5 TABLET ORAL at 11:03

## 2019-03-07 RX ADMIN — STANDARDIZED SENNA CONCENTRATE AND DOCUSATE SODIUM 1 TABLET: 8.6; 5 TABLET, FILM COATED ORAL at 11:03

## 2019-03-07 RX ADMIN — POLYETHYLENE GLYCOL 3350 17 G: 17 POWDER, FOR SOLUTION ORAL at 03:03

## 2019-03-07 RX ADMIN — PROPOFOL 75 MCG/KG/MIN: 10 INJECTION, EMULSION INTRAVENOUS at 12:03

## 2019-03-07 RX ADMIN — VASOPRESSIN 3 UNITS: 20 INJECTION INTRAVENOUS at 01:03

## 2019-03-07 RX ADMIN — FENTANYL CITRATE 25 MCG: 50 INJECTION INTRAMUSCULAR; INTRAVENOUS at 03:03

## 2019-03-07 RX ADMIN — CELECOXIB 400 MG: 200 CAPSULE ORAL at 11:03

## 2019-03-07 RX ADMIN — LIDOCAINE HYDROCHLORIDE 50 MG: 20 INJECTION, SOLUTION INTRAVENOUS at 12:03

## 2019-03-07 RX ADMIN — GLYCOPYRROLATE 0.2 MG: 0.2 INJECTION, SOLUTION INTRAMUSCULAR; INTRAVENOUS at 12:03

## 2019-03-07 RX ADMIN — EPHEDRINE SULFATE 10 MG: 50 INJECTION, SOLUTION INTRAMUSCULAR; INTRAVENOUS; SUBCUTANEOUS at 01:03

## 2019-03-07 NOTE — PLAN OF CARE
Problem: Occupational Therapy Goal  Goal: Occupational Therapy Goal  Goals to be met by: 7 days    Patient will increase functional independence with ADLs by performing:    UE Dressing with Supervision.  LE Dressing with Supervision with AD as needed.  Grooming while standing with Supervision.  Toileting from bedside commode with Supervision for hygiene and clothing management.   Stand pivot transfers with Supervision.  Toilet transfer to bedside commode with Supervision.       Outcome: Ongoing (interventions implemented as appropriate)  OT eval complete. Pt tolerated session well. Pt's functional outcomes established today based on her presenting functional level.     Comments: Cont OT POC

## 2019-03-07 NOTE — PLAN OF CARE
Problem: Physical Therapy Goal  Goal: Physical Therapy Goal  Goals to be met by: 3/14/19     Patient will increase functional independence with mobility by performin. Supine to sit with Set-up [place]  2. Sit to supine with Set-up [place]  3. Sit to stand transfer with Supervision  4. Bed to chair transfer with Stand-by Assistance using Rolling Walker  5. Gait  x 100 feet with Stand-by Assistance using Rolling Walker.   6. Ascend/Descend 7 inch curb step with Contact Guard Assistance using Rolling Walker.  7. Lower extremity exercise program x20-30 reps per handout, with independence      Outcome: Ongoing (interventions implemented as appropriate)  Pt tolerated session well today with no complications and demonstrated appropriate mobility s/p (R) DAVID. Pt with no LOB during ambulation using sW for support. Pt able to follow 3-point gait sequencing well with no complications. Pt will cont to benefit from therapy services and is appropriate for HHPT upon d/c.

## 2019-03-07 NOTE — INTERVAL H&P NOTE
Letty Krueger was interviewed, examined and the H and P reviewed.  There has been no interval change in her History and Physical.

## 2019-03-07 NOTE — ANESTHESIA POSTPROCEDURE EVALUATION
"Anesthesia Post Evaluation    Patient: Letty Krueger    Procedure(s) Performed: Procedure(s) (LRB):  ARTHROPLASTY, HIP, TOTAL, DANA COMPUTER-ASSISTED NAVIGATION (Right)    Final Anesthesia Type: spinal  Patient location during evaluation: PACU  Patient participation: Yes- Able to Participate  Level of consciousness: awake and alert and oriented  Post-procedure vital signs: reviewed and stable  Pain management: adequate  Airway patency: patent  PONV status at discharge: No PONV  Anesthetic complications: no      Cardiovascular status: blood pressure returned to baseline and hemodynamically stable  Respiratory status: unassisted, room air and spontaneous ventilation  Hydration status: euvolemic  Follow-up not needed.        Visit Vitals  BP (!) 112/52 (BP Location: Left arm, Patient Position: Lying)   Pulse 73   Temp 36.1 °C (97 °F) (Oral)   Resp 19   Ht 5' 2" (1.575 m)   Wt 81.6 kg (180 lb)   SpO2 96%   Breastfeeding? No   BMI 32.92 kg/m²       Pain/Greg Score: Pain Rating Prior to Med Admin: 9 (3/7/2019  3:06 PM)  Pain Rating Post Med Admin: 0 (3/7/2019 11:53 AM)  Greg Score: 9 (3/7/2019 11:58 AM)        "

## 2019-03-07 NOTE — PLAN OF CARE
[facility]    HOME HEALTH ORDERS  FACE TO FACE ENCOUNTER    Patient Name: Letty Krueger  YOB: 1945    PCP: Bev Watt NP   PCP Address: [address] [facility] / [place] [address]  PCP Phone Number: [phone]  PCP Fax: [phone]    Encounter Date: 03/07/2019    Admit to Home Health    Diagnoses:  Active Hospital Problems    Diagnosis  POA    Primary osteoarthritis of right hip [M16.11]  Yes      Resolved Hospital Problems   No resolved problems to display.       Future Appointments   Date Time Provider Department Center   3/22/2019 10:30 AM Bryanna Melissa PA-C [facility]   3/29/2019 10:30 AM Ene Rae MD [facility]           I have seen and examined this patient face to face today. My clinical findings that support the need for the home health skilled services and home bound status are the following:  Weakness/numbness causing balance and gait disturbance due to Joint Replacement making it taxing to leave home.  Requiring assistive device to leave home due to unsteady gait caused by Joint Replacement.    Allergies:Review of patient's allergies indicates:  No Known Allergies    Diet: regular diet    Activities: activity as tolerated    Home Health Admitting Clinician:   SN/PT to complete comprehensive assessment including routine vital signs. Instruct on disease process and s/s of complications to report to MD. Follow specific home health arthoplasty protocol. Review/verify medication list sent home with the patient at time of discharge  and instruct patient/caregiver as needed. If coumadin ordered, coumadin clinic to manage INR with INR draws 2x per week with a goal to maintain INR between 1.8 and 2.2. Frequency may be adjusted depending on start of care date.    Notify MD if SBP > 160 or < 90; DBP > 90 or < 50; HR > 120 or < 50; Temp > 101    Home Medical Equipment:  Walker, 3-1 bedside commode, transfer tub  bench    CONSULTS:    Physical Therapy may admit if patient not on coumadin, PT to perform comprehensive assessment if performing admit visit and generate therapy plan of care. Evaluate for home safety and equipment needs; Establish/upgrade home exercise program. Perform/instruct on therapeutic exercises, gait training, transfer training, and Range of Motion.    WOUND CARE ORDERS:  Assess Surgical Incision/DSRG each TX  Aquacel AG drsg applied post-op leave on 14 days post op. Call MD if any drainage reaches border to border of drsg horizontally, s/s of infection, temp >101, induration, swelling or redness.  If dressing is removed per MD order, then apply island dressing, change/teach caregiver to perform daily dressing change if island dressing present.    Medications: Review discharge medications with patient and family and provide education.      Current Discharge Medication List      START taking these medications    Details   aspirin (ECOTRIN) 81 MG EC tablet Take 1 tablet (81 mg total) by mouth 2 (two) times daily.  Qty: 60 tablet, Refills: 0      docusate sodium (COLACE) 100 MG capsule Take 1 capsule (100 mg total) by mouth 2 (two) times daily as needed for Constipation.  Qty: 60 capsule, Refills: 0      ondansetron (ZOFRAN-ODT) 8 MG TbDL Take 1 tablet (8 mg total) by mouth every 12 (twelve) hours as needed (nausea).  Qty: 20 tablet, Refills: 0      oxyCODONE-acetaminophen (PERCOCET) 5-325 mg per tablet Take 1 tablet by mouth every 4 to 6 hours as needed for Pain.  Qty: 40 tablet, Refills: 0         CONTINUE these medications which have NOT CHANGED    Details   cyanocobalamin (VITAMIN B-12) 1000 MCG tablet Take 1,000 mcg by mouth once daily.       lisinopril-hydrochlorothiazide (PRINZIDE,ZESTORETIC) 20-25 mg Tab lisinopril 20 mg-hydrochlorothiazide 25 mg tablet- 1 tablet oral daily      multivitamin capsule Take 1 capsule by mouth once daily.      rosuvastatin (CRESTOR) 10 MG tablet Take 10 mg by mouth once  daily.       gabapentin (NEURONTIN) 400 MG capsule gabapentin 400 mg capsule             I certify that this patient is confined to her home and needs intermittent skilled nursing care, physical therapy and occupational therapy.

## 2019-03-07 NOTE — PT/OT/SLP EVAL
Physical Therapy Evaluation    Patient Name:  Letty Krueger   MRN:  [id number]    Recommendations:     Discharge Recommendations:  (HHPT)   Discharge Equipment Recommendations: walker, rolling, commode, tub bench(hip kit)   Barriers to discharge: None    Assessment:     Letty Krueger is a 74 y.o. female admitted with a medical diagnosis of <principal problem not specified>.  She presents with the following impairments/functional limitations:  weakness, impaired endurance, impaired self care skills, impaired balance, gait instability, impaired functional mobilty, pain, impaired joint extensibility, decreased lower extremity function, decreased ROM, orthopedic precautions, impaired skin.    -Pt tolerated session well today with no complications and demonstrated appropriate mobility s/p (R) DAVID. Pt with no LOB during ambulation using sW for support. Pt able to follow 3-point gait sequencing well with no complications. Pt will cont to benefit from therapy services and is appropriate for HHPT upon d/c.    Rehab Prognosis: Good; patient would benefit from acute skilled PT services to address these deficits and reach maximum level of function.    Recent Surgery: Procedure(s) (LRB):  ARTHROPLASTY, HIP, TOTAL, DANA COMPUTER-ASSISTED NAVIGATION (Right) Day of Surgery    Plan:     During this hospitalization, patient to be seen BID to address the identified rehab impairments via gait training, therapeutic activities, therapeutic exercises and progress toward the following goals:    · Plan of Care Expires:  04/07/19    Subjective     Chief Complaint: impaired mobility  Patient/Family Comments/goals: return home to OF  Pain/Comfort:  · Pain Rating 1: 5/10  · Location - Side 1: Right  · Location - Orientation 1: generalized  · Location 1: hip  · Pain Addressed 1: Pre-medicate for activity, Cessation of Activity, Reposition  · Pain Rating Post-Intervention 1: 10/10    Patients cultural, spiritual, Alevism  conflicts given the current situation:      Living Environment:  -Pt lives in a 3rd floor apartment with elevator access and no DARVIN. Pt will have (A) from her s/o upon d/c. Pt has a tub/shower combo with no seat.  Prior to admission, patients level of function was (I).  Equipment used at home: cane, straight.  DME owned (not currently used): .  Upon discharge, patient will have assistance from family.    Objective:     Communicated with nsg prior to session.  Patient found all lines intact blood pressure cuff, FCD, peripheral IV, PCEA, pulse ox (continuous), telemetry  upon PT entry to room.    General Precautions: Standard, fall   Orthopedic Precautions:RLE weight bearing as tolerated, RLE posterior precautions   Braces: N/A     Exams:  · Sensation:    · -       Intact  light/touch (B) LE  · RLE ROM: WFL except limits from hip precautions  · RLE Strength: Deficits: 4/5 grossly  · LLE ROM: WNL  · LLE Strength: WNL    Functional Mobility:  · Bed Mobility:     · Scooting: contact guard assistance  · Supine to Sit: minimum assistance  · Sit to Supine: minimum assistance  · Transfers:     · Sit to Stand:  contact guard assistance with standard walker  · Gait: 3 steps fwd/back, 3 lateral steps using SW with CGA      Therapeutic Activities and Exercises:   -Pt educated on:  A. PT POC and role of PT  B. Importance of OOB activity to improve functional outcomes   C. DME mgmt and t/f sequencing  D. Performing HEP to reduce the risk of developing blood clots  E. Gait sequencing   F. D/c planning      AM-PAC 6 CLICK MOBILITY  Total Score:18     Patient left supine with all lines intact, call button in reach and nsg notified.    GOALS:   Multidisciplinary Problems     Physical Therapy Goals        Problem: Physical Therapy Goal    Goal Priority Disciplines Outcome Goal Variances Interventions   Physical Therapy Goal     PT, PT/OT Ongoing (interventions implemented as appropriate)     Description:  Goals to be met by: 3/14/19      Patient will increase functional independence with mobility by performin. Supine to sit with Set-up [place]  2. Sit to supine with Set-up [place]  3. Sit to stand transfer with Supervision  4. Bed to chair transfer with Stand-by Assistance using Rolling Walker  5. Gait  x 100 feet with Stand-by Assistance using Rolling Walker.   6. Ascend/Descend 7 inch curb step with Contact Guard Assistance using Rolling Walker.  7. Lower extremity exercise program x20-30 reps per handout, with independence                        History:     Past Medical History:   Diagnosis Date    Arthritis     DDD (degenerative disc disease), lumbar 2018    Hyperlipidemia     Hypertension        Past Surgical History:   Procedure Laterality Date    eye lid surgery - Clint syndrome      Injection,steroid,epidural,transforaminal approach   Bilateral L3 Bilateral 10/1/2018    Performed by Terri Wolf MD at [facility]    Injection,steroid,epidural,transforaminal approach  L4 Bilateral 2018    Performed by Terri Wolf MD at [facility]       Time Tracking:     PT Received On: 19  PT Start Time: 1615     PT Stop Time: 1635  PT Total Time (min): 20 min     Billable Minutes: Evaluation 12 and Gait Training 8      Moody Vaughan, PT  2019

## 2019-03-07 NOTE — BRIEF OP NOTE
Comfortable  Vitals Stable  Dressing Dry/Intact  Bilateral lower extremities: Palpable DP, good capillary refill  Motor sensation intact  xrays taken today demonstrate a well fixed and positioned DAVID.  Labs reviewed  S/P DAVID  Continue current treatment

## 2019-03-07 NOTE — ANESTHESIA PROCEDURE NOTES
Does NOT APPEAR VISUALLY SIGNIFICANT. Spinal    Diagnosis: osteoarthritis  Patient location during procedure: OR  Start time: 3/7/2019 12:20 PM  Timeout: 3/7/2019 12:20 PM  End time: 3/7/2019 12:25 PM  Staffing  Anesthesiologist: Musa Hein MD  Performed: anesthesiologist   Preanesthetic Checklist  Completed: patient identified, surgical consent, pre-op evaluation, timeout performed, IV checked, risks and benefits discussed and monitors and equipment checked  Spinal Block  Patient position: sitting  Prep: ChloraPrep  Patient monitoring: heart rate, continuous pulse ox and frequent blood pressure checks  Approach: midline  Location: L4-5  Injection technique: single shot  CSF Fluid: clear free-flowing CSF  Needle  Needle type: pencil-tip   Needle gauge: 25 G  Needle length: 5 in  Additional Documentation: incremental injection, negative aspiration for heme and no paresthesia on injection  Needle localization: anatomical landmarks  Assessment  Ease of block: easy  Patient's tolerance of the procedure: comfortable throughout block and no complaints

## 2019-03-07 NOTE — NURSING TRANSFER
Nursing Transfer Note      3/7/2019     Transfer To: 553 b    Transfer via stretcher    Transfer with ivf    Transported by pct    Medicines sent: none    Chart send with patient: Yes    Notified: friend Floyd

## 2019-03-07 NOTE — TRANSFER OF CARE
"Anesthesia Transfer of Care Note    Patient: Letty Krueger    Procedure(s) Performed: Procedure(s) (LRB):  ARTHROPLASTY, HIP, TOTAL, DANA COMPUTER-ASSISTED NAVIGATION (Right)    Patient location: PACU    Anesthesia Type: general    Transport from OR: Transported from OR on room air with adequate spontaneous ventilation    Post pain: pain needs to be addressed    Post assessment: no apparent anesthetic complications    Post vital signs: stable    Level of consciousness: awake, alert and oriented    Nausea/Vomiting: no nausea/vomiting    Complications: none    Transfer of care protocol was followed      Last vitals:   Visit Vitals  BP (!) 112/52 (BP Location: Left arm, Patient Position: Lying)   Pulse 73   Temp 36.1 °C (97 °F) (Oral)   Resp 19   Ht 5' 2" (1.575 m)   Wt 81.6 kg (180 lb)   SpO2 96%   Breastfeeding? No   BMI 32.92 kg/m²     "

## 2019-03-07 NOTE — OPERATIVE NOTE ADDENDUM
Certification of Assistant at Surgery       Surgery Date: 3/7/2019     Participating Surgeons:  Surgeon(s) and Role:     * Stan Aguilar MD - Primary    Procedures:  Procedure(s) (LRB):  ARTHROPLASTY, HIP, TOTAL, DANA COMPUTER-ASSISTED NAVIGATION (Right)    Assistant Surgeon's Certification of Necessity:  I understand that section 1842 (b) (6) (d) of the Social Security Act generally prohibits Medicare Part B reasonable charge payment for the services of assistants at surgery in teaching hospitals when qualified residents are available to furnish such services. I certify that the services for which payment is claimed were medically necessary, and that no qualified resident was available to perform the services. I further understand that these services are subject to post-payment review by the Medicare carrier.      Bryanna Melissa PA-C    03/07/2019  2:39 PM

## 2019-03-07 NOTE — OP NOTE
DATE OF PROCEDURE: 3/7/2019   PREOPERATIVE DIAGNOSIS: Arthritis,right hip.   POSTOPERATIVE DIAGNOSIS: Arthritis, right hip.   PROCEDURES PERFORMED: right total hip arthroplasty with robotic navigation.   SURGEON: Stan Aguilar M.D.   ASSISTANT: Bryanna Melissa PA-C (due to the fact that there was no available   qualified resident, Physician's Assistant Bryanna Melissa acted as first   assistant during this case).   ANESTHESIA: Spinal epidural.   SPECIMEN: Femoral Head  FINDINGS: Arthritis  FLUID: 3000 mL  BLOOD LOSS: 500 mL  COMPLICATIONS: None.   COUNTS: Correct.   DISPOSITION: Recovery Room, stable.   IMPLANTS: Frandy Tritanium size 54 acetabular component with a 36 x 54 liner, one   6.5 bone screw, Cando Accolade II size 5 , 127-degree neck angle V40   hip stem with a 36 mm +5 Biolox femoral head.   INDICATIONS FOR PROCEDURE: Letty Krueger is a 74-year-old female who is having   symptoms of right hip pain. Physical examination and imaging studies were   consistent with right hip arthritis. She did not respond to nonoperative   treatment measures. Treatment options were explained to the patient. She   decided to proceed with right total hip arthroplasty with robotic assistance.   She was aware of reasonable treatment options as well as risks and benefits, and   elected to undergo the procedure.   PROCEDURE IN DETAIL: After appropriate consent was obtained, the patient was   brought in the Operating Room, anesthesia was administered. Prior to this, She received antibiotic prophylaxis.   She was then positioned in the lateral decubitus position with the right hip   pointed upward. Axillary roll was placed. Bony prominences were well padded.   Pegboard positioning system was utilized. The right hip and lower extremity   were then prepped and draped in the usual sterile fashion. Posterolateral   approach to the hip was made. Incision was based on the posterior one-third of   the greater trochanter. This  was taken down through the skin and tensor fascia.   The tensor fascia was split in line with the skin incision. The sciatic nerve   was palpated, it was out of harm's way and the Charnley retractor was placed.   Short external rotators were identified. The femoral neck was isolated with the   use of retractors. The piriformis tendon was released and tagged. The   conjoint tendon was released and tagged. Capsule was released in a trapezoidal   fashion, tagged superiorly and inferiorly. The hip was then dislocated. The   distance from the lesser trochanter to the center of the femoral head was   measured and found to be 63 mm.  We attempted to duplicate this length. Femoral neck cut was made.   Femoral head was removed. The acetabulum was then exposed with the use of   retractors. The reflected head of the rectus and anterior capsule was released.   The sciatic nerve was palpated; it was out of harm's way. Excellent exposure   of the acetabulum was obtained, and the labrum was excised. At this point, the   2 pins for the array were placed superiorly to the acetabulum. Care was taken   when placing these not to damage any soft tissue. We were very satisfied with   their position and fixation. The array was firmly seated to these, and then the   acetabular checkpoint was placed. At this point, we were satisfied with our   setup. We confirmed the position of the checkpoint and then registered our   acetabular anatomy and landmarks. Once this was accomplished, we brought the   SID into the field and with a goal of 40 degrees of abduction and 25 degrees of   anteversion, we reamed the acetabulum. We were very satisfied with the reaming. The reamer was then removed.  We then used the cup  with the SID   and impacted the cup. We were very satisfied with its position and fixation.   Position was confirmed at 40 and 24. We were quite pleased with this. We did   augment with one 6.5 bone screw and then the liner was  firmly seated.  All acetabular   retractors were raised and checkpoints were removed. Attention was then turned   to the proximal femur, it was brought into the field with a Fajardo. Once   again, the sciatic nerve was out of harm's way. Soft tissue was retracted   laterally off the greater trochanter with a thin bent Hohmann. Soft tissue was   then removed from the lateral aspect of the femoral neck. The femoral neck was   then osteotomized with a box osteotome. The canal finder was then used to   open the canal. A lateralizing broach was then used to open laterally. We    broached to a 4, calcar planed off of this,   inserted the 5 broach. We knew through preoperative templating that she would   require a 127-degree neck angle stem. I re-measured the distance from the lesser trochanter to the center of the femoral head and the +5 head would duplicate the leg length.    The  trial head was placed and a reduction was performed.. There was no instability   or impingement in extremes of motion. She had very good motion. She had 35-40  degrees of combined anteversion, and leg lengths seemed very well reapproximated   clinically. At this point, the hip was dislocated with the aid of a neck hook.   The femoral trial component was removed. The actual femoral component was   firmly seated. The neck and calcar was inspected.  There was no crack or fracture. I re-remeasured at this point and, once again, we needed the +5  head. The head was then impacted onto a clean, dry trunion. The   acetabulum was inspected. There was no loose body, foreign body, or soft tissue   interposition. The hip was then reduced, brought through range of motion, and   stable as previously described. At this point, the hip was soaked in a dilute betadine solution for three minutes, then irrigated. The  short external rotators were repaired through 2 drill holes in the greater trochanter. An excellent repair was obtained, and the sciatic nerve  remained   out of harm's way, this was palpated. Charnley retractor was removed. The hip   was, once again soaked in betadine irrigated. The tensor fascia was closed with figures-of-eight   using #1 Vicryl. Tranexamic acid was then injected. Once the fascia was   closed, the remaining incision was closed with 0 Vicryl and 2-0 Vicryl in   layers. It was irrigated periodically. The skin was closed with stratofix and dermabond. A   sterile dressing was applied. She was then turned supine, transferred from the   Operating Room table to a stretcher, brought to Recovery Room in stable   condition, tolerated the procedure well, and there were no known complications.

## 2019-03-07 NOTE — PT/OT/SLP EVAL
Occupational Therapy   Evaluation/ Treatment     Name: Letty Krueger  MRN: [id number]  Admitting Diagnosis:  <principal problem not specified> Day of Surgery    Recommendations:     Discharge recommendations: Home w/ HH     Barriers to discharge: None    Equipment recommendations: rolling walker, bedside commode, transfer tub bench and hip kit     History:     Occupational Profile:  Living Environment: Pt lives alone in 3rd floor apartment w/ elevator entry; no DARVIN thru front door. Pt uses a tub/shower combo  Previous level of function: PTA, pt reports being independent w/ ADLs and mod I w/ functional mobility (cane)  Equipment Owned: cane   Assistance Upon Discharge: Pt reports she will have assistance from her significant other and her sister in law upon d/c home.     Past Medical History:   Diagnosis Date    Arthritis     DDD (degenerative disc disease), lumbar 9/6/2018    Hyperlipidemia     Hypertension        Past Surgical History:   Procedure Laterality Date    eye lid surgery - Clint syndrome      Injection,steroid,epidural,transforaminal approach   Bilateral L3 Bilateral 10/1/2018    Performed by [facility] Wolf [facility] at [facility]    Injection,steroid,epidural,transforaminal approach  L4 Bilateral 9/6/2018    Performed by Terri Wolf MD at [facility] PA[facility]       Subjective     Communicated with: RN prior to session.    Pt agreeable to Evaluation.    Pain/Comfort:  Pain level: 5/10 in R hip, then increased to 10/10    Objective:     Pt found supine in bed with blood pressure cuff, nicolas catheter, telemetry, PCEA, pulse ox, Peripheral IV and FCD.    Orthopedic Precautions: R LE: weight bearing as tolerated; R LE posterior hip precautions    Occupational Performance:    Bed Mobility:    Supine to sit: Min A  Sit to supine: Min A    Transfers:    Sit<>Stand: Min A, SW    Ambulation:    Pt ambulated 3 steps forward, backward, and side stepped to HOB w/ CGA and Min verbal cueing for gait sequence w/ SW  - no signs of LOB or SOB.    Activities of Daily Living:  UE dressing: Maximum Assistance to dee gown around back  LE dressing: Total Assistance to dee socks  Pt educated on LE dressing techniques and need for AD with LE dressing    Therapeutic Activities and Exercises:  Pt educated on posterior hip precautions.  Pt able to recall 1/3 hip precautions.     Patient left supine in bed with all lines intact and RN notified.    [facility] 6 Click: Self-care  [facility] Total Score: 16    Education:    Assessment:     Letty Krueger is a 74 y.o. female with a medical diagnosis of <principal problem not specified>.  She presents with decreased function of R LE impeding her ability to perform ADLs and functional mobility and would benefit from OT services to maximize functional (I) and safety.    Performance deficits affecting function are weakness, impaired endurance, impaired self care skills, impaired functional mobilty, gait instability, impaired balance, decreased lower extremity function, decreased safety awareness, pain, impaired skin, decreased ROM, edema, orthopedic precautions..      Rehab Prognosis:  Good    Plan:     Patient to be seen QD to address the above listed problems via self-care/home management, therapeutic activities, therapeutic exercises    Plan of Care Reviewed with: patient    This Plan of care has been discussed with the patient who was involved in its development and understands and is in agreement with the identified goals and treatment plan    GOALS:   Multidisciplinary Problems     Occupational Therapy Goals        Problem: Occupational Therapy Goal    Goal Priority Disciplines Outcome Interventions   Occupational Therapy Goal     OT, PT/OT Ongoing (interventions implemented as appropriate)    Description:  Goals to be met by: 7 days    Patient will increase functional independence with ADLs by performing:    UE Dressing with Supervision.  LE Dressing with Supervision with AD as  needed.  Grooming while standing with Supervision.  Toileting from bedside commode with Supervision for hygiene and clothing management.   Stand pivot transfers with Supervision.  Toilet transfer to bedside commode with Supervision.                         Time Tracking:     OT Received On: 3/7/2019  OT Start Time: 1615  OT Stop Time: 1635   OT Total Time: 20 minutes     Billable Minutes:  Evaluation 12 minutes  Therapeutic Activity 8 minutes    Bernadine Gore, OT  3/7/2019

## 2019-03-07 NOTE — PROGRESS NOTES
Pt AAOX4, leaving PACU for inpatient unit. NAD noted. Pain managed. VSS. Family friend Floyd notified of transfer per pt request

## 2019-03-07 NOTE — ANESTHESIA PROCEDURE NOTES
PENG    Patient location during procedure: pre-op   Block not for primary anesthetic.  Reason for block: at surgeon's request and post-op pain management   Post-op Pain Location: right hip  Start time: 3/7/2019 11:50 AM  Timeout: 3/7/2019 11:45 AM   End time: 3/7/2019 11:56 AM  Staffing  Anesthesiologist: Hema Rizzo MD  Resident/CRNA: Marcus Sanchez MD  Performed: resident/CRNA   Preanesthetic Checklist  Completed: patient identified, site marked, surgical consent, pre-op evaluation, timeout performed, IV checked, risks and benefits discussed and monitors and equipment checked  Peripheral Block  Patient position: supine  Prep: ChloraPrep  Patient monitoring: heart rate, cardiac monitor, continuous pulse ox, continuous capnometry and frequent blood pressure checks  Block type: PENG  Laterality: right  Injection technique: single shot  Needle  Needle type: Stimuplex   Needle gauge: 21 G  Needle length: 4 in  Needle localization: ultrasound guidance   -ultrasound image captured on disc.  Assessment  Injection assessment: negative aspiration and negative parasthesia  Heart rate change: no  Additional Notes  10cc of 0.75% bupivacaine with epi was added to 10cc of NS. 1 mg of dexamethasone and 50mcg of clonidine was added

## 2019-03-07 NOTE — NURSING TRANSFER
Nursing Transfer Note      3/7/2019     Transfer From: PACU    Transfer via stretcher    Transfer with IV pump    Transported by escort    Medicines sent:     Chart send with patient: Yes    Notified:     Patient reassessed at: 1800 (date, time)    Upon arrival to floor: patient oriented to room, call bell in reach and bed in lowest position

## 2019-03-08 ENCOUNTER — TELEPHONE (OUTPATIENT)
Dept: ORTHOPEDICS | Facility: CLINIC | Age: 74
End: 2019-03-08

## 2019-03-08 VITALS
DIASTOLIC BLOOD PRESSURE: 60 MMHG | WEIGHT: 180 LBS | OXYGEN SATURATION: 97 % | TEMPERATURE: 98 F | BODY MASS INDEX: 33.13 KG/M2 | HEART RATE: 98 BPM | SYSTOLIC BLOOD PRESSURE: 120 MMHG | RESPIRATION RATE: 16 BRPM | HEIGHT: 62 IN

## 2019-03-08 PROCEDURE — 97535 SELF CARE MNGMENT TRAINING: CPT

## 2019-03-08 PROCEDURE — 01996 PR DAILY MGMT,EPIDUR/SUBARACH CONT DRUG ADM: ICD-10-PCS | Mod: ,,, | Performed by: ANESTHESIOLOGY

## 2019-03-08 PROCEDURE — 63600175 PHARM REV CODE 636 W HCPCS: Performed by: PHYSICIAN ASSISTANT

## 2019-03-08 PROCEDURE — 25000003 PHARM REV CODE 250: Performed by: PHYSICIAN ASSISTANT

## 2019-03-08 PROCEDURE — 97116 GAIT TRAINING THERAPY: CPT

## 2019-03-08 PROCEDURE — 01996 DLY HOSP MGMT EDRL RX ADMIN: CPT | Mod: ,,, | Performed by: ANESTHESIOLOGY

## 2019-03-08 PROCEDURE — 25000003 PHARM REV CODE 250: Performed by: STUDENT IN AN ORGANIZED HEALTH CARE EDUCATION/TRAINING PROGRAM

## 2019-03-08 PROCEDURE — 97110 THERAPEUTIC EXERCISES: CPT

## 2019-03-08 PROCEDURE — 97530 THERAPEUTIC ACTIVITIES: CPT

## 2019-03-08 RX ADMIN — ROSUVASTATIN CALCIUM 10 MG: 10 TABLET, FILM COATED ORAL at 09:03

## 2019-03-08 RX ADMIN — TAMSULOSIN HYDROCHLORIDE 0.4 MG: 0.4 CAPSULE ORAL at 09:03

## 2019-03-08 RX ADMIN — ACETAMINOPHEN 1000 MG: 500 TABLET ORAL at 05:03

## 2019-03-08 RX ADMIN — LISINOPRIL AND HYDROCHLOROTHIAZIDE 2 TABLET: 12.5; 1 TABLET ORAL at 09:03

## 2019-03-08 RX ADMIN — OXYCODONE HYDROCHLORIDE 5 MG: 5 TABLET ORAL at 06:03

## 2019-03-08 RX ADMIN — ASPIRIN 81 MG: 81 TABLET, COATED ORAL at 09:03

## 2019-03-08 RX ADMIN — ONDANSETRON 4 MG: 2 INJECTION INTRAMUSCULAR; INTRAVENOUS at 06:03

## 2019-03-08 RX ADMIN — OXYCODONE HYDROCHLORIDE 5 MG: 5 TABLET ORAL at 09:03

## 2019-03-08 RX ADMIN — FAMOTIDINE 20 MG: 20 TABLET ORAL at 09:03

## 2019-03-08 RX ADMIN — CEFAZOLIN 2 G: 330 INJECTION, POWDER, FOR SOLUTION INTRAMUSCULAR; INTRAVENOUS at 01:03

## 2019-03-08 RX ADMIN — MUPIROCIN 1 G: 20 OINTMENT TOPICAL at 09:03

## 2019-03-08 NOTE — ADDENDUM NOTE
Addendum  created 03/08/19 1220 by Holly Alarcon RN    Visit Navigator SmartForm Flowsheet section accepted

## 2019-03-08 NOTE — ASSESSMENT & PLAN NOTE
Letty Krueger is a 74 y.o. female POD 1 s/p right DAVID. 95  Degrees this AM will re take temperature. Otherwise doing well       - Weight bearing status: wbat  - Pain control: Per APS  - Antibiotics: ancef/vanc  - DVT Prophylaxis: asa 81 bid , SCD's at all times when not ambulating.  - PT/OT  - Lines/Drains: none  - Dispo: home with HH after cleared by PT

## 2019-03-08 NOTE — PLAN OF CARE
03/08/19 0956   Post-Acute Status   Post-Acute Authorization Placement   Post-Acute Placement Status Referrals Sent     Patient in need of  services. SW sent a referral via  and will follow up.    Marcelle Haely LMSW

## 2019-03-08 NOTE — PT/OT/SLP PROGRESS
"Physical Therapy Treatment    Patient Name:  Letty Krueger   MRN:  [id number]    Recommendations:     Discharge Recommendations:  (HHPT)   Discharge Equipment Recommendations: commode, walker, rolling, tub bench(hip kit)   Barriers to discharge: Decreased caregiver support lives alone    Assessment:     Letty Krueger is a 74 y.o. female admitted with a medical diagnosis of Primary osteoarthritis of right hip.  She presents with the following impairments/functional limitations:  weakness, pain, impaired functional mobilty, orthopedic precautions, decreased lower extremity function. Pt able to state 1/3 post hip prec and required verbal cues during treatment to maintain. Pt is motivated to progress with mobility.    Rehab Prognosis: Good; patient would benefit from acute skilled PT services to address these deficits and reach maximum level of function.    Recent Surgery: Procedure(s) (LRB):  ARTHROPLASTY, HIP, TOTAL, DANA COMPUTER-ASSISTED NAVIGATION (Right) 1 Day Post-Op    Plan:     During this hospitalization, patient to be seen BID to address the identified rehab impairments via gait training, therapeutic activities, therapeutic exercises and progress toward the following goals:    · Plan of Care Expires:  04/07/19    Subjective   "I want to do everything I need to to avoid complications"  Pain/Comfort:  · Pain Rating 1: 5/10  · Location - Side 1: Right  · Location - Orientation 1: generalized  · Location 1: hip  · Pain Addressed 1: Pre-medicate for activity, Reposition, Cessation of Activity  · Pain Rating Post-Intervention 1: 5/10      Objective:     Communicated with nurse prior to session.  Patient found all lines intact, call button in reach and nurse notified FCD  upon PT entry to room.     General Precautions: Standard, fall   Orthopedic Precautions:RLE weight bearing as tolerated, RLE posterior precautions   Braces: N/A     Functional Mobility:  · Bed Mobility:     · Supine to Sit: stand by " "assistance  · Sit to Supine: minimum assistance  · Transfers:     · Sit to Stand:  supervision with rolling walker  · Gait: 150ft with RW with supervision; pt educated to lead with her R foot when turning to the R to avoid R LE internal rotation.   · Stairs:  Pt ascended/descended 6" curb step with Rolling Walker with no handrails with Stand-by Assistance with verbal cues for proper technique.    AM-PAC 6 CLICK MOBILITY  Turning over in bed (including adjusting bedclothes, sheets and blankets)?: 3  Sitting down on and standing up from a chair with arms (e.g., wheelchair, bedside commode, etc.): 4  Moving from lying on back to sitting on the side of the bed?: 4  Moving to and from a bed to a chair (including a wheelchair)?: 4  Need to walk in hospital room?: 4  Climbing 3-5 steps with a railing?: 3  Basic Mobility Total Score: 22     Therapeutic Activities and Exercises:   Pt able to state 1/3 post hip prec; pt educated in post hip prec and given handout with post hip prec and HEP. Pt performed R LE exercises per DAVID protocol x 10 reps: quad sets, ankle pumps, and glut sets.    Patient left up in chair with all lines intact, call button in reach and nurse notified..    GOALS:   Multidisciplinary Problems     Physical Therapy Goals        Problem: Physical Therapy Goal    Goal Priority Disciplines Outcome Goal Variances Interventions   Physical Therapy Goal     PT, PT/OT Ongoing (interventions implemented as appropriate)     Description:  Goals to be met by: 3/14/19     Patient will increase functional independence with mobility by performin. Supine to sit with Set-up Fenton-met  2. Sit to supine with Set-up Fenton  3. Sit to stand transfer with Supervision-met  4. Bed to chair transfer with Stand-by Assistance using Rolling Walker  5. Gait  x 100 feet with Stand-by Assistance using Rolling Walker. -met  6. Ascend/Descend 7 inch curb step with Contact Guard Assistance using Rolling Walker.-met  7. " Lower extremity exercise program x20-30 reps per handout, with independence                         Time Tracking:     PT Received On: 03/08/19  PT Start Time: 0740     PT Stop Time: 0815  PT Total Time (min): 35 min     Billable Minutes: Gait Training 20 and Therapeutic Exercise 15    Treatment Type: Treatment  PT/PTA: PT           Kajal Telles, PT  03/08/2019

## 2019-03-08 NOTE — PLAN OF CARE
JOSUÉ spoke with patient and informed her that she has been accepted for HH services with [facility] to begin on Saturday.    Marcelle Healy LMSW

## 2019-03-08 NOTE — PLAN OF CARE
POD #1 R DAVID. This CM met with patient at bedside. Patient lives alone, states her sister-in-law and boyfriend will assist her at home. Care team recommend HHPT, dme rw, commode, tub bench, hip kit. Patient refused tub bench and hip kit. Prefers [facility] HH services. Boyfriend will provide transport home at discharge.  Patient is with [facility], spoke with Frannie at [facility] and was told to send referral to [facility] HH, also patient has appointment with Dr. Sellers [address] [address] thru [facility] on Monday 3/11/2019 at 10:oo am.       [facility] - [place] - [address] [facility]  [address] [facility] [address]  Phone: [phone] Fax: [phone]    Payor: [employer] MEDICARE / Plan: Visual Pro 360 (SPECIAL NEEDS PLAN) / Product Type: Medicare Advantage /       03/08/19 0815   Discharge Assessment   Assessment Type Discharge Planning Assessment   Confirmed/corrected address and phone number on facesheet? Yes   Assessment information obtained from? Patient   Expected Length of Stay (days) 1   Communicated expected length of stay with patient/caregiver yes   Prior to hospitilization cognitive status: Alert/Oriented   Prior to hospitalization functional status: Independent   Current cognitive status: Alert/Oriented   Current Functional Status: Assistive Equipment   Facility Arrived From: (from home)   Lives With alone   Able to Return to Prior Arrangements yes   Is patient able to care for self after discharge? Yes   Who are your caregiver(s) and their phone number(s)? (sister-in-law  Lyric [phone])   Patient's perception of discharge disposition home or selfcare   Readmission Within the Last 30 Days no previous admission in last 30 days   Patient currently being followed by outpatient case management? No   Patient currently receives any other outside agency services? No   Equipment Currently Used at Home none   Do you have any problems affording any of your  prescribed medications? No   Is the patient taking medications as prescribed? yes   Does the patient have transportation home? Yes   Transportation Anticipated family or friend will provide   Does the patient receive services at the [facility]? No   Discharge Plan A Home;Home Health   Discharge Plan B Home with family   DME Needed Upon Discharge  walker, rolling;commode;bath bench;hip kit  (patient refused tub bench and hip kit)   Patient/Family in Agreement with Plan yes     Alejandra Payton RN/BSN/HARSH  [facility]  [phone]  [place]/IM[place]OUR[place]/IM

## 2019-03-08 NOTE — PLAN OF CARE
Patient discharge home with friend. [facility] to provide services. dme equipment delivered at bedside.    Future Appointments   Date Time Provider Department Center   3/22/2019 10:30 AM Bryanna Melissa PA-C [facility] ORTHO Jose A thuy   3/29/2019 10:30 AM Ene Rae MD [facility] - [place] - [address]  [facility]  [address]  [address] [facility] [address]  Phone: [phone] Fax: [phone]       03/08/19 1258   Final Note   Assessment Type Final Discharge Note   Anticipated Discharge Disposition Home-Health   What phone number can be called within the next 1-3 days to see how you are doing after discharge? [phone]  (sister-in-law  Lyric [phone])   Hospital Follow Up  Appt(s) scheduled? Yes   Discharge plans and expectations educations in teach back method with documentation complete? Yes  (per staff)   Right Care Referral Info   Post Acute Recommendation Home-care   Referral Type (HH)   Facility Name ([facility])     Alejandra Payton RN/BSN/CM  [facility]  [phone]  Ortho/IMK/[facility]H

## 2019-03-08 NOTE — PT/OT/SLP PROGRESS
Occupational Therapy   Treatment/ Discharge Summary    Name: Letty Krueger  MRN: [id number]  Admitting Diagnosis:  Primary osteoarthritis of right hip  1 Day Post-Op    Recommendations:     Discharge Recommendations: (home w/ HH )  Discharge Equipment Recommendations:  commode, walker, rolling, tub bench(hip kit )  Barriers to discharge:  None    Assessment:     Letty Krueger is a 74 y.o. female with a medical diagnosis of Primary osteoarthritis of right hip.  She presents with no further acute OT needs at this time. Pt is performing her self-care and functional mobility tasks safely. Pt will have assistance from her significant other upon d/c from hospital. Pt will benefit from further therapy w/ HH services to maximize her functional independence and ensure her safety w/ all functional tasks within her home environment.      Performance deficits affecting function are weakness, orthopedic precautions, decreased lower extremity function, impaired functional mobilty, impaired self care skills, gait instability.     Rehab Prognosis:  Good; patient would benefit from acute skilled OT services to address these deficits and reach maximum level of function.       Plan:     · (d/c from acute OT - please reconsult if anything changes )   · Plan of Care Expires: 04/07/19  · Plan of Care Reviewed with: patient    Subjective     Pain/Comfort:  · Pain Rating 1: 5/10  · Location - Side 1: Right  · Location - Orientation 1: generalized  · Location 1: hip  · Pain Addressed 1: Pre-medicate for activity, Reposition, Cessation of Activity  · Pain Rating Post-Intervention 1: (did not rate)    Objective:     Communicated with: RN prior to session.  Patient found HOB elevated with FCD upon OT entry to room.    General Precautions: Standard, fall   Orthopedic Precautions:RLE posterior precautions, RLE weight bearing as tolerated   Braces: N/A     Occupational Performance:     Bed Mobility:    · Not assessed; pt remained  [facility]    Functional Mobility/Transfers:  · Patient completed Sit <> Stand Transfer with stand by assistance  with  rolling walker   · Patient completed Toilet Transfer Step Transfer technique with stand by assistance with  rolling walker and bedside commode  · Functional Mobility: Pt ambulated household distance w/ SBA and RW for increased stability and safety. Pt did not LOB or SOB    Activities of Daily Living:  · Grooming: modified independence washing hands standing at sink   · Upper Body Dressing: modified independence donned bra and overhead shirt in standing w/ RW support  · Lower Body Dressing: minimum assistance for AD utilization to don underwear and pants - pt states she has a reacher at home and S.O. will be able to assist her   · Toileting: modified independence for blas-hygiene in standing and SBA for clothing management      [facility] 6 Click ADL: 23    Treatment & Education:  - OT POC  - Importance of OOB activity to maximize recovery   - Safety w/ functional mobility; hand placement to ensure safe transfers to various surfaces  - reviewed all posterior hip precautions  - LBD adaptive technique w/ AD to maintain hip precautions   - proper gait sequence w/ RW management     Patient left up in chair with all lines intact, call button in reach and RN notifiedEducation:      GOALS:   Multidisciplinary Problems     Occupational Therapy Goals     Not on file          Multidisciplinary Problems (Resolved)        Problem: Occupational Therapy Goal    Goal Priority Disciplines Outcome Interventions   Occupational Therapy Goal   (Resolved)     OT, PT/OT Outcome(s) achieved    Description:  Goals to be met by: 7 days    Patient will increase functional independence with ADLs by performing:    UE Dressing with Supervision.  LE Dressing with Supervision with AD as needed.  Grooming while standing with Supervision.  Toileting from bedside commode with Supervision for hygiene and clothing management.   Stand pivot  transfers with Supervision.  Toilet transfer to bedside commode with Supervision.                         Time Tracking:     OT Date of Treatment: 03/08/19  OT Start Time: 0939  OT Stop Time: 1008  OT Total Time (min): 29 min    Billable Minutes:Self Care/Home Management 15  Therapeutic Activity 14    Bernadine Gore, OT  3/8/2019

## 2019-03-08 NOTE — SUBJECTIVE & OBJECTIVE
Principal Problem:<principal problem not specified>    Principal Orthopedic Problem: same    Interval History: Patient seen and examined at bedside. Day 1 s/p right pierre doing well.   No acute events overnight.  Pain controlled.  PT in PACU yesterday      Review of patient's allergies indicates:  No Known Allergies    Current Facility-Administered Medications   Medication    0.9%  NaCl infusion    acetaminophen (10 mg/mL) injection 1,000 mg    Followed by    acetaminophen tablet 1,000 mg    acetaminophen tablet 1,000 mg    aspirin EC tablet 81 mg    aspirin EC tablet 81 mg    bisacodyl suppository 10 mg    bisacodyl suppository 10 mg    ceFAZolin injection 2 g    [START ON 3/9/2019] celecoxib capsule 200 mg    famotidine tablet 20 mg    famotidine tablet 20 mg    lisinopril-hydrochlorothiazide 10-12.5 mg per tablet 2 tablet    morphine injection 2 mg    morphine injection 2 mg    morphine injection 2 mg    mupirocin 2 % ointment 1 g    mupirocin 2 % ointment 1 g    naloxone 0.4 mg/mL injection 0.02 mg    naloxone 0.4 mg/mL injection 0.02 mg    ondansetron injection 4 mg    ondansetron injection 4 mg    oxyCODONE immediate release tablet 5 mg    polyethylene glycol packet 17 g    polyethylene glycol packet 17 g    pregabalin capsule 75 mg    pregabalin capsule 75 mg    promethazine (PHENERGAN) 6.25 mg in dextrose 5 % 50 mL IVPB    promethazine (PHENERGAN) 6.25 mg in dextrose 5 % 50 mL IVPB    ramelteon tablet 8 mg    ramelteon tablet 8 mg    rosuvastatin tablet 10 mg    senna-docusate 8.6-50 mg per tablet 1 tablet    senna-docusate 8.6-50 mg per tablet 1 tablet    tamsulosin 24 hr capsule 0.4 mg     Objective:     Vital Signs (Most Recent):  Temp: (!) 95.8 °F (35.4 °C) (03/08/19 0436)  Pulse: 89 (03/08/19 0436)  Resp: 18 (03/08/19 0436)  BP: 127/66 (03/08/19 0436)  SpO2: 100 % (03/08/19 0436) Vital Signs (24h Range):  Temp:  [95.2 °F (35.1 °C)-97.6 °F (36.4 °C)] 95.8 °F (35.4  "°C)  Pulse:  [47-89] 89  Resp:  [16-23] 18  SpO2:  [93 %-100 %] 100 %  BP: (102-149)/(52-74) 127/66     Weight: 81.6 kg (180 lb)  Height: 5' 2" (157.5 cm)  Body mass index is 32.92 kg/m².      Intake/Output Summary (Last 24 hours) at 3/8/2019 0529  Last data filed at 3/8/2019 0329  Gross per 24 hour   Intake 2400 ml   Output 1200 ml   Net 1200 ml       Ortho/SPM Exam  Physical Exam:  NAD, A/O x 3.  Wound c/d/i with clean dressing.  No focal motor or sensory deficits noted.   PT 95 degrees this am    Significant Labs: All pertinent labs within the past 24 hours have been reviewed.    Significant Imaging: I have reviewed and interpreted all pertinent imaging results/findings.  "

## 2019-03-08 NOTE — PLAN OF CARE
Problem: Physical Therapy Goal  Goal: Physical Therapy Goal  Goals to be met by: 3/14/19     Patient will increase functional independence with mobility by performin. Supine to sit with Set-up [place]-met  2. Sit to supine with Set-up [place]  3. Sit to stand transfer with Supervision-met  4. Bed to chair transfer with Stand-by Assistance using Rolling Walker  5. Gait  x 100 feet with Stand-by Assistance using Rolling Walker. -met  6. Ascend/Descend 7 inch curb step with Contact Guard Assistance using Rolling Walker.-met  7. Lower extremity exercise program x20-30 reps per handout, with independence       Outcome: Ongoing (interventions implemented as appropriate)  Pt's goals partially met and pt will continue to benefit from skilled PT services to work towards improved functional mobility including: bed mobility, transfers, and gait.   Kajal Telles, PT  3/8/2019

## 2019-03-08 NOTE — PROGRESS NOTES
[facility]  Orthopedics  Progress Note    Patient Name: Letty Krueger  MRN: [id number]  Admission Date: 3/7/2019  Hospital Length of Stay: 1 days  Attending Provider: Stan Aguliar MD  Primary Care Provider: Bev Watt NP  Follow-up For: Procedure(s) (LRB):  ARTHROPLASTY, HIP, TOTAL, DANA COMPUTER-ASSISTED NAVIGATION (Right)    Post-Operative Day: 1 Day Post-Op  Subjective:     Principal Problem:<principal problem not specified>    Principal Orthopedic Problem: same    Interval History: Patient seen and examined at bedside. Day 1 s/p right pierre doing well.   No acute events overnight.  Pain controlled.  PT in PACU yesterday      Review of patient's allergies indicates:  No Known Allergies    Current Facility-Administered Medications   Medication    0.9%  NaCl infusion    acetaminophen (10 mg/mL) injection 1,000 mg    Followed by    acetaminophen tablet 1,000 mg    acetaminophen tablet 1,000 mg    aspirin EC tablet 81 mg    aspirin EC tablet 81 mg    bisacodyl suppository 10 mg    bisacodyl suppository 10 mg    ceFAZolin injection 2 g    [START ON 3/9/2019] celecoxib capsule 200 mg    famotidine tablet 20 mg    famotidine tablet 20 mg    lisinopril-hydrochlorothiazide 10-12.5 mg per tablet 2 tablet    morphine injection 2 mg    morphine injection 2 mg    morphine injection 2 mg    mupirocin 2 % ointment 1 g    mupirocin 2 % ointment 1 g    naloxone 0.4 mg/mL injection 0.02 mg    naloxone 0.4 mg/mL injection 0.02 mg    ondansetron injection 4 mg    ondansetron injection 4 mg    oxyCODONE immediate release tablet 5 mg    polyethylene glycol packet 17 g    polyethylene glycol packet 17 g    pregabalin capsule 75 mg    pregabalin capsule 75 mg    promethazine (PHENERGAN) 6.25 mg in dextrose 5 % 50 mL IVPB    promethazine (PHENERGAN) 6.25 mg in dextrose 5 % 50 mL IVPB    ramelteon tablet 8 mg    ramelteon tablet 8 mg    rosuvastatin tablet 10 mg     "senna-docusate 8.6-50 mg per tablet 1 tablet    senna-docusate 8.6-50 mg per tablet 1 tablet    tamsulosin 24 hr capsule 0.4 mg     Objective:     Vital Signs (Most Recent):  Temp: (!) 95.8 °F (35.4 °C) (03/08/19 0436)  Pulse: 89 (03/08/19 0436)  Resp: 18 (03/08/19 0436)  BP: 127/66 (03/08/19 0436)  SpO2: 100 % (03/08/19 0436) Vital Signs (24h Range):  Temp:  [95.2 °F (35.1 °C)-97.6 °F (36.4 °C)] 95.8 °F (35.4 °C)  Pulse:  [47-89] 89  Resp:  [16-23] 18  SpO2:  [93 %-100 %] 100 %  BP: (102-149)/(52-74) 127/66     Weight: 81.6 kg (180 lb)  Height: 5' 2" (157.5 cm)  Body mass index is 32.92 kg/m².      Intake/Output Summary (Last 24 hours) at 3/8/2019 0540  Last data filed at 3/8/2019 0329  Gross per 24 hour   Intake 2400 ml   Output 1200 ml   Net 1200 ml       Ortho/SPM Exam  Physical Exam:  NAD, A/O x 3.  Wound c/d/i with clean dressing.  No focal motor or sensory deficits noted.   PT 95 degrees this am    Significant Labs: All pertinent labs within the past 24 hours have been reviewed.    Significant Imaging: I have reviewed and interpreted all pertinent imaging results/findings.    Assessment/Plan:     Primary osteoarthritis of right hip    Letty Krueger is a 74 y.o. female POD 1 s/p right DAVID. 95  Degrees this AM will re take temperature. Otherwise doing well       - Weight bearing status: wbat  - Pain control: Per APS  - Antibiotics: ancef/vanc  - DVT Prophylaxis: asa 81 bid , SCD's at all times when not ambulating.  - PT/OT  - Lines/Drains: none  - Dispo: home with  after cleared by PT                 Wojciech Stokes MD  Orthopedics  [facility]  "

## 2019-03-08 NOTE — ADDENDUM NOTE
Addendum  created 03/08/19 1024 by Chey Rae MD    Charge Capture section accepted, Sign clinical note

## 2019-03-08 NOTE — TELEPHONE ENCOUNTER
Spoke with pt I told informed her  To use three to four pillows  Elevate above her heart 1-3 times a day for 30-40 min.pt.verbilized understanding.       ----- Message from Devika Morgan sent at 3/8/2019  2:43 PM CST -----  Contact: [email or web address]  Patient just had surgery patient wants to know should she prop her legs up please call patient.

## 2019-03-08 NOTE — ANESTHESIA POST-OP PAIN MANAGEMENT
Acute Pain Service and Perioperative Surgical Home Progress Note    Interval history  Letty Krueger is a 74 y.o., female, with 2 year history of Right hip pain. Pt underwent R total hip arthroplasty yesterday [3/7/19]. She had an epidural catheter placed and removed following the procedure yesterday. She is currently POD1. Has required 3 doses of 5 mg oxycodone [2 yesterday, one this am] and one dose of 10 mg oxycodone yesterday.       Surgery:  Procedure(s) (LRB):  ARTHROPLASTY, HIP, TOTAL, DANA COMPUTER-ASSISTED NAVIGATION (Right)    Post Op Day #: 1    Catheter type: epidural now removed     Problem List:    Active Hospital Problems    Diagnosis  POA    *Primary osteoarthritis of right hip [M16.11]  Yes      Resolved Hospital Problems   No resolved problems to display.       Subjective:       General appearance of alert, oriented, no complaints   Pain with rest: 4    Numbers   Pain with movement: 5    Numbers   Side Effects    1. Pruritis No    2. Nausea No    3. Motor Blockade No, 1=Ability to bend knees and ankles    4. Sedation No, 1=awake and alert    Schedule Medications:    acetaminophen  1,000 mg Intravenous Once    Followed by    acetaminophen  1,000 mg Oral Q6H    acetaminophen  1,000 mg Oral Q6H    aspirin  81 mg Oral BID    aspirin  81 mg Oral BID    ceFAZolin (ANCEF) IVPB  2 g Intravenous Q8H    [START ON 3/9/2019] celecoxib  200 mg Oral Daily    famotidine  20 mg Oral BID    famotidine  20 mg Oral BID    lisinopril-hydrochlorothiazide  2 tablet Oral Daily    mupirocin  1 g Nasal BID    mupirocin  1 g Nasal BID    polyethylene glycol  17 g Oral Daily    polyethylene glycol  17 g Oral Daily    pregabalin  75 mg Oral QHS    pregabalin  75 mg Oral QHS    rosuvastatin  10 mg Oral Daily    senna-docusate 8.6-50 mg  1 tablet Oral BID    senna-docusate 8.6-50 mg  1 tablet Oral BID    tamsulosin  0.4 mg Oral Daily        Continuous Infusions:   sodium chloride 0.9% Stopped  (03/08/19 0514)        PRN Medications:  bisacodyl, bisacodyl, morphine, morphine, morphine, naloxone, naloxone, ondansetron, ondansetron, oxyCODONE, promethazine (PHENERGAN) IVPB, promethazine (PHENERGAN) IVPB, ramelteon, ramelteon       Antibiotics:  Antibiotics (From admission, onward)    Start     Stop Route Frequency Ordered    03/08/19 0900  mupirocin 2 % ointment 1 g      03/13 0859 Nasl 2 times daily 03/07/19 1459    03/07/19 1600  ceFAZolin injection 2 g      03/08 0759 IV Every 8 hours (non-standard times) 03/07/19 1459    03/07/19 1446  mupirocin 2 % ointment 1 g      03/12 2059 Nasl 2 times daily 03/07/19 1446             Objective:    Pt up, sitting in chair. NAD. WO complaints at this time.     Vital Signs (Most Recent):  Temp: (!) 35.4 °C (95.8 °F) (03/08/19 0436)  Pulse: 89 (03/08/19 0436)  Resp: 18 (03/08/19 0436)  BP: 127/66 (03/08/19 0436)  SpO2: 100 % (03/08/19 0436) Vital Signs Range (Last 24H):  Temp:  [35.1 °C (95.2 °F)-36.4 °C (97.6 °F)]   Pulse:  [47-89]   Resp:  [16-23]   BP: (102-149)/(52-74)   SpO2:  [93 %-100 %]          I & O (Last 24H):    Intake/Output Summary (Last 24 hours) at 3/8/2019 0741  Last data filed at 3/8/2019 0329  Gross per 24 hour   Intake 2400 ml   Output 1200 ml   Net 1200 ml       Physical Exam:    GA: Alert, comfortable, no acute distress.   Pulmonary: Clear to auscultation A/P/L. No wheezing, crackles, or rhonchi.  Cardiac: RRR S1 & S2 w/o rubs/murmurs/gallops.   Abdominal:Bowel sounds present. No tenderness to palpation or distension. No appreciable hepatosplenomegaly.   Skin: No jaundice, rashes, or visible lesions.         Laboratory:  CBC:   Recent Labs     03/07/19  1500   WBC 11.29   RBC 3.93*   HGB 11.5*   HCT 35.9*      MCV 91   MCH 29.3   MCHC 32.0       BMP:   Recent Labs     03/07/19  1500     136   K 3.9  3.9   CO2 24  24     106   BUN 13  13   CREATININE 0.7  0.7   *  117*   CALCIUM 8.1*  8.1*       No results for  input(s): PT, INR, PROTIME, APTT in the last 72 hours.      Assessment:     Pain control adequate    Plan:     1) Pain: Epidural discontinued per protocol, tip intact.    2) HTN - continue HCTZ-lisinopril   3) HLD - continue rosuvastatin    4) FEN/GI: Tolerating full diet. No BM/gas yet   5) Dispo: Pt to work with PT/OT this am. Plan for D/C today but if not, tomorrow morning        Evaluator Riley Moore MD     I have seen the patient, reviewed the Resident's assessment and plan. I have personally interviewed and examined the patient at bedside and: agree with the findings.

## 2019-03-11 NOTE — DISCHARGE SUMMARY
"[facility]  Orthopedics  Discharge Summary      Patient Name: Letty Krueger  MRN: [id number]  Admission Date: 3/7/2019  Hospital Length of Stay: 1 days  Discharge Date and Time: 3/8/2019 12:21 PM  Attending Physician: No att. providers found   Discharging Provider: Wojciech Stokes MD  Primary Care Provider: Bev Watt NP    HPI: see hpi    Procedure(s) (LRB):  ARTHROPLASTY, HIP, TOTAL, DANA COMPUTER-ASSISTED NAVIGATION (Right)      Hospital Course: Patient presented for above procedure.  Tolerated it well and was discharged home POD 1 after voiding, tolerating diet, ambulating, pain controlled.  Discharge instructions, follow-up appointment, and med rec are below.          Significant Diagnostic Studies: No pertinent studies.    Pending Diagnostic Studies:     Procedure Component Value Units Date/Time    CBC auto differential [[id number]] Collected:  03/07/19 1505    Order Status:  Sent Lab Status:  In process Updated:  03/07/19 1506    Specimen:  Blood         Final Active Diagnoses:    Diagnosis Date Noted POA    PRINCIPAL PROBLEM:  Primary osteoarthritis of right hip [M16.11] 03/07/2019 Yes      Problems Resolved During this Admission:      Discharged Condition: stable    Disposition: Home or Self Care    Follow Up:    Patient Instructions:      WALKER FOR HOME USE     Order Specific Question Answer Comments   Type of Walker: Adult (5'4"-6'6")    With wheels? Yes    Height: 5' 2" (1.575 m)    Weight: 81.6 kg (180 lb)    Length of need (1-99 months): 99    Does patient have medical equipment at home? cane, straight    Please check all that apply: Walker will be used for gait training.    Vendor: [facility]    Expected Date of Delivery: 3/8/2019      3 IN 1 COMMODE FOR HOME USE     Order Specific Question Answer Comments   Type: Standard    Height: 5' 2" (1.575 m)    Weight: 81.6 kg (180 lb)    Does patient have medical equipment at home? canbj straight    Length of need (1-99 " "months): 99    Vendor: Other (use comments)    Expected Date of Delivery: 3/8/2019      TRANSFER TUB BENCH FOR HOME USE     Order Specific Question Answer Comments   Type of Transfer Tub Bench: Unpadded    Height: 5' 2" (1.575 m)    Weight: 81.6 kg (180 lb)    Does patient have medical equipment at home? cane, straight    Length of need (1-99 months): 99    Vendor: Other (use comments)    Expected Date of Delivery: 3/8/2019      3 IN 1 COMMODE FOR HOME USE     Order Specific Question Answer Comments   Type: Standard    Height: 5' 2" (1.575 m)    Weight: 83.6 kg (184 lb 4.9 oz)    Does patient have medical equipment at home? cane, straight    Length of need (1-99 months): 99    Vendor: [facility]    Expected Date of Delivery: 3/8/2019      TRANSFER TUB BENCH FOR HOME USE     Order Specific Question Answer Comments   Type of Transfer Tub Bench: Unpadded    Height: 5' 2" (1.575 m)    Weight: 83.6 kg (184 lb 4.9 oz)    Does patient have medical equipment at home? cane, straight    Length of need (1-99 months): 99    Vendor: Other (use comments)    Expected Date of Delivery: 3/8/2019      WALKER FOR HOME USE     Order Specific Question Answer Comments   Type of Walker: Adult (5'4"-6'6")    With wheels? Yes    Height: 5' 2" (1.575 m)    Weight: 83.6 kg (184 lb 4.9 oz)    Length of need (1-99 months): 99    Does patient have medical equipment at home? cane, straight    Please check all that apply: Walker will be used for gait training.    Vendor: Other (use comments)    Expected Date of Delivery: 3/8/2019      Activity as tolerated     Sponge bath only until clinic visit     Lifting restrictions   Order Comments: No strenuous exercise or lifting of > 10 lbs     Weight bearing as tolerated     No driving, operating heavy equipment or signing legal documents while taking pain medication     Leave dressing on - Keep it clean, dry, and intact until clinic visit   Order Comments: Do not remove surgical dressing for 2 weeks " post-op. This will be done only by MD at initial post-op visit. If dressing is completely saturated, replace with identical dressing - silver-impregnated hydrocolloid dressing.    Do not get dressings wet. Do not shower.    If dressing continues to be saturated or there are signs of infection, please call [facility] [phone] for further instructions and to make appt to be seen.     Call MD for:  temperature >100.4     Call MD for:  persistent nausea and vomiting     Call MD for:  severe uncontrolled pain     Call MD for:  difficulty breathing, headache or visual disturbances     Call MD for:  redness, tenderness, or signs of infection (pain, swelling, redness, odor or green/yellow discharge around incision site)     Call MD for:  hives     Call MD for:  persistent dizziness or light-headedness     Call MD for:  extreme fatigue     Activity as tolerated     Call MD for:  difficulty breathing, headache or visual disturbances     Call MD for:  extreme fatigue     Call MD for:  hives     Call MD for:  persistent dizziness or light-headedness     Call MD for:  persistent nausea and vomiting     Call MD for:  redness, tenderness, or signs of infection (pain, swelling, redness, odor or green/yellow discharge around incision site)     Call MD for:  severe uncontrolled pain     Call MD for:  temperature >100.4     Leave dressing on - Keep it clean, dry, and intact until clinic visit   Order Comments: Do not remove surgical dressing for 2 weeks post-op. This will be done only by MD at initial post-op visit. If dressing is completely saturated, replace with identical dressing - silver-impregnated hydrocolloid dressing.    Do not get dressings wet. Do not shower.    If dressing continues to be saturated or there are signs of infection, please call [facility] [phone] for further instructions and to make appt to be seen.     Lifting restrictions   Order Comments: No strenuous exercise or lifting of > 10 lbs     No  driving, operating heavy equipment or signing legal documents while taking pain medication     Sponge bath only until clinic visit     Weight bearing as tolerated     Medications:  Reconciled Home Medications:      Medication List      START taking these medications    aspirin 81 MG EC tablet  Commonly known as:  ECOTRIN  Take 1 tablet (81 mg total) by mouth 2 (two) times daily.     ondansetron 8 MG Tbdl  Commonly known as:  ZOFRAN-ODT  Dissolve 1 tablet (8 mg total) by mouth every 12 (twelve) hours as needed (nausea).     oxyCODONE-acetaminophen 5-325 mg per tablet  Commonly known as:  PERCOCET  Take 1 tablet by mouth every 4 to 6 hours as needed for Pain.     STOOL SOFTENER 100 MG capsule  Generic drug:  docusate sodium  Take 1 capsule (100 mg total) by mouth 2 (two) times daily as needed for Constipation.        CONTINUE taking these medications    gabapentin 400 MG capsule  Commonly known as:  NEURONTIN  gabapentin 400 mg capsule     lisinopril-hydrochlorothiazide 20-25 mg Tab  Commonly known as:  PRINZIDE,ZESTORETIC  lisinopril 20 mg-hydrochlorothiazide 25 mg tablet- 1 tablet oral daily     multivitamin capsule  Take 1 capsule by mouth once daily.     rosuvastatin 10 MG tablet  Commonly known as:  CRESTOR  Take 10 mg by mouth once daily.     VITAMIN B-12 1000 MCG tablet  Generic drug:  cyanocobalamin  Take 1,000 mcg by mouth once daily.            Wojciech Stokes MD  Orthopedics  [facility]

## 2019-03-11 NOTE — PROGRESS NOTES
Physical Therapy Discharge Summary    Name: Letty Krueger  MRN: [id number]   Principal Problem: Primary osteoarthritis of right hip     Patient Discharged from acute Physical Therapy on 3/8/19.  Please refer to prior PT noted date on 3/8/19 for functional status.     Assessment:     Patient appropriate for care in another setting.    Objective:     GOALS:   Multidisciplinary Problems     Physical Therapy Goals     Not on file          Multidisciplinary Problems (Resolved)        Problem: Physical Therapy Goal    Goal Priority Disciplines Outcome Goal Variances Interventions   Physical Therapy Goal   (Resolved)     PT, PT/OT Outcome(s) achieved     Description:  Goals to be met by: 3/14/19     Patient will increase functional independence with mobility by performin. Supine to sit with Set-up [place]-met  2. Sit to supine with Set-up [place]  3. Sit to stand transfer with Supervision-met  4. Bed to chair transfer with Stand-by Assistance using Rolling Walker  5. Gait  x 100 feet with Stand-by Assistance using Rolling Walker. -met  6. Ascend/Descend 7 inch curb step with Contact Guard Assistance using Rolling Walker.-met  7. Lower extremity exercise program x20-30 reps per handout, with independence                     Goals partially met    Reasons for Discontinuation of Therapy Services  Transfer to alternate level of care.      Plan:     Patient Discharged to: Home with Home Health Service.    Kajal Telles, PT  3/11/2019

## 2019-03-13 ENCOUNTER — TELEPHONE (OUTPATIENT)
Dept: ORTHOPEDICS | Facility: CLINIC | Age: 74
End: 2019-03-13

## 2019-03-13 NOTE — TELEPHONE ENCOUNTER
Spoke with pt, notified her that she can take the miralax for constipation. Pt verbalized understanding and had no further questions.    ----- Message from Bryanna Melissa PA-C sent at 3/13/2019  3:35 PM CDT -----  Contact: PT  Yes she can.     ----- Message -----  From: Sona Torres MA  Sent: 3/13/2019   1:32 PM  To: Bryanna Melissa PA-C        ----- Message -----  From: Milana Craven  Sent: 3/13/2019  12:36 PM  To: Lauren CYR Staff      Reason for call: Patient needs to know if you can take Miralax because of constipation has not had a bowel movement since last week.        Communication Preference: [phone]    Additional Information:

## 2019-03-22 ENCOUNTER — OFFICE VISIT (OUTPATIENT)
Dept: ORTHOPEDICS | Facility: CLINIC | Age: 74
End: 2019-03-22
Payer: MEDICARE

## 2019-03-22 VITALS
BODY MASS INDEX: 33.1 KG/M2 | DIASTOLIC BLOOD PRESSURE: 59 MMHG | SYSTOLIC BLOOD PRESSURE: 110 MMHG | WEIGHT: 179.88 LBS | HEART RATE: 77 BPM | HEIGHT: 62 IN

## 2019-03-22 DIAGNOSIS — Z96.641 STATUS POST TOTAL HIP REPLACEMENT, RIGHT: Primary | ICD-10-CM

## 2019-03-22 PROCEDURE — 99024 POSTOP FOLLOW-UP VISIT: CPT | Mod: S$GLB,,, | Performed by: PHYSICIAN ASSISTANT

## 2019-03-22 PROCEDURE — 99999 PR PBB SHADOW E&M-EST. PATIENT-LVL III: ICD-10-PCS | Mod: PBBFAC,,, | Performed by: PHYSICIAN ASSISTANT

## 2019-03-22 PROCEDURE — 99024 PR POST-OP FOLLOW-UP VISIT: ICD-10-PCS | Mod: S$GLB,,, | Performed by: PHYSICIAN ASSISTANT

## 2019-03-22 PROCEDURE — 99999 PR PBB SHADOW E&M-EST. PATIENT-LVL III: CPT | Mod: PBBFAC,,, | Performed by: PHYSICIAN ASSISTANT

## 2019-03-22 RX ORDER — OXYCODONE AND ACETAMINOPHEN 5; 325 MG/1; MG/1
1 TABLET ORAL
Qty: 30 TABLET | Refills: 0 | Status: SHIPPED | OUTPATIENT
Start: 2019-03-22 | End: 2019-09-30

## 2019-03-22 NOTE — PROGRESS NOTES
"Letty Krueger presents for initial post-operative visit following a right total hip arthroplasty performed by Dr. Aguilar on 3/7/2019.  Tolerating pain medication well.     Exam:  Blood pressure (!) 110/59, pulse 77, height 5' 2" (1.575 m), weight 81.6 kg (179 lb 14.3 oz).   Patient is ambulating well with walker.   Incision is clean and dry without drainage or erythema.      Initial post-operative radiographs reviewed today revealing satisfactory position of the prosthesis.    A/P  2 weeks s/p right total hip replacement    - Patient advised to keep the incision clean and dry for the next 24 hours after which she  may wash the area with antibacterial soap in the shower, but is advised not to submerge until the incision is completely healed.  - Outpatient physical therapy orders faxed to [facility] in [facility].  - Follow hip precautions for 3 months.  - Continue ASA for 1 month from surgery.  - Pain medication refilled. She has constipation controlled.  - Follow up in 4 weeks with new x-rays. Pt will call clinic immediately with    problems/concerns.     "

## 2019-03-25 ENCOUNTER — TELEPHONE (OUTPATIENT)
Dept: ADMINISTRATIVE | Facility: CLINIC | Age: 74
End: 2019-03-25

## 2019-03-25 NOTE — TELEPHONE ENCOUNTER
Home Health SOC 03/09/2019 - 05/07/2019 with [facility] ([employer] - Dr. Stan Aguilar. Patient received SN and PT services.

## 2019-03-28 ENCOUNTER — TELEPHONE (OUTPATIENT)
Dept: SPINE | Facility: CLINIC | Age: 74
End: 2019-03-28

## 2019-03-28 NOTE — TELEPHONE ENCOUNTER
Patient was contacted as per patient she stated she cancelled her appt due to her having recent hip surgery and would like another appt. Patient was informed that there is no availability at the moment however she will be placed on a waiting list and when there's a cancellation she will be contacted. Patient verbalized understanding

## 2019-03-28 NOTE — TELEPHONE ENCOUNTER
----- Message from Kika Morales sent at 3/28/2019 12:11 PM CDT -----  Contact: Pt   Name of Who is Calling: CHAPARRO HALL [[id number]]      What is the request in detail: Patient would like to speak with staff in regards to rescheduling her 3 month follow up for a later date but sooner than the next available appointment with Dr. Rae.Please contact to further discuss and advise      Can the clinic reply by [facility]: No      What Number [facility]: [phone]

## 2019-04-02 ENCOUNTER — TELEPHONE (OUTPATIENT)
Dept: ORTHOPEDICS | Facility: CLINIC | Age: 74
End: 2019-04-02

## 2019-04-02 NOTE — TELEPHONE ENCOUNTER
----- Message from Bryanna Melissa PA-C sent at 4/2/2019 11:15 AM CDT -----  Contact: Self/ [phone]  Please call and let her know that she cannot drive if she is still using pain medication. If she is off of pain medication and can get in and out of car without breaking her hip precautions she can drive. She should not drive if she does not think she could slam on brakes.     ----- Message -----  From: Alessia Simon MA  Sent: 4/2/2019   9:37 AM  To: Bryanna Melissa PA-C        ----- Message -----  From: Alessandra Jerez  Sent: 4/2/2019   9:27 AM  To: Belén Gould Staff    Patient would like a call back to see if she can drive again.

## 2019-04-29 NOTE — ADDENDUM NOTE
Addendum  created 04/29/19 1216 by Marcus Sanchez MD    Diagnosis association updated, Intraprocedure Blocks edited, Sign clinical note

## 2019-04-30 ENCOUNTER — HOSPITAL ENCOUNTER (OUTPATIENT)
Dept: RADIOLOGY | Facility: HOSPITAL | Age: 74
Discharge: HOME | End: 2019-04-30
Attending: PHYSICIAN ASSISTANT
Payer: MEDICARE

## 2019-04-30 ENCOUNTER — OFFICE VISIT (OUTPATIENT)
Dept: ORTHOPEDICS | Facility: CLINIC | Age: 74
End: 2019-04-30
Payer: MEDICARE

## 2019-04-30 VITALS — BODY MASS INDEX: 33.1 KG/M2 | WEIGHT: 179.88 LBS | HEIGHT: 62 IN

## 2019-04-30 DIAGNOSIS — M25.551 PAIN OF RIGHT HIP JOINT: ICD-10-CM

## 2019-04-30 DIAGNOSIS — M25.551 PAIN OF RIGHT HIP JOINT: Primary | ICD-10-CM

## 2019-04-30 DIAGNOSIS — Z96.641 STATUS POST TOTAL HIP REPLACEMENT, RIGHT: Primary | ICD-10-CM

## 2019-04-30 PROCEDURE — 99024 PR POST-OP FOLLOW-UP VISIT: ICD-10-PCS | Mod: S$GLB,,, | Performed by: PHYSICIAN ASSISTANT

## 2019-04-30 PROCEDURE — 99999 PR PBB SHADOW E&M-EST. PATIENT-LVL III: ICD-10-PCS | Mod: PBBFAC,,, | Performed by: PHYSICIAN ASSISTANT

## 2019-04-30 PROCEDURE — 99024 POSTOP FOLLOW-UP VISIT: CPT | Mod: S$GLB,,, | Performed by: PHYSICIAN ASSISTANT

## 2019-04-30 PROCEDURE — 73502 X-RAY EXAM HIP UNI 2-3 VIEWS: CPT | Mod: TC,RT

## 2019-04-30 PROCEDURE — 99999 PR PBB SHADOW E&M-EST. PATIENT-LVL III: CPT | Mod: PBBFAC,,, | Performed by: PHYSICIAN ASSISTANT

## 2019-04-30 PROCEDURE — 73502 XR HIP 2 VIEW RIGHT: ICD-10-PCS | Mod: 26,RT,, | Performed by: RADIOLOGY

## 2019-04-30 PROCEDURE — 73502 X-RAY EXAM HIP UNI 2-3 VIEWS: CPT | Mod: 26,RT,, | Performed by: RADIOLOGY

## 2019-04-30 RX ORDER — HYDROCODONE BITARTRATE AND ACETAMINOPHEN 5; 325 MG/1; MG/1
1 TABLET ORAL
Qty: 20 TABLET | Refills: 0 | Status: SHIPPED | OUTPATIENT
Start: 2019-04-30 | End: 2019-09-30

## 2019-04-30 NOTE — PROGRESS NOTES
"Letty Krueger presents for 6 week post-operative visit following a right total hip arthroplasty performed by Dr. Aguilar on 3/7/2019.  Off of pain medication. Doing very well.     Exam:  Height 5' 2" (1.575 m), weight 81.6 kg (179 lb 14.3 oz).   Patient is ambulating well. Cane for long distances only.   Incision is well healed    New radiographs obtained & reviewed today revealing satisfactory position of the prosthesis.    A/P  6 weeks s/p right total hip replacement  - Continue outpatient physical therapy  @  [facility] in [facility].  - Follow hip precautions for 3 months.  - Reviewed antibiotic prophylaxis  - Follow up in 6 weeks with Dr. Aguilar. Pt will call clinic immediately with problems/concerns.     "

## 2019-06-24 ENCOUNTER — OFFICE VISIT (OUTPATIENT)
Dept: ORTHOPEDICS | Facility: CLINIC | Age: 74
End: 2019-06-24
Payer: MEDICARE

## 2019-06-24 VITALS — WEIGHT: 186.38 LBS | BODY MASS INDEX: 34.3 KG/M2 | HEIGHT: 62 IN

## 2019-06-24 DIAGNOSIS — Z96.641 STATUS POST TOTAL HIP REPLACEMENT, RIGHT: Primary | ICD-10-CM

## 2019-06-24 PROCEDURE — 99024 PR POST-OP FOLLOW-UP VISIT: ICD-10-PCS | Mod: S$GLB,,, | Performed by: ORTHOPAEDIC SURGERY

## 2019-06-24 PROCEDURE — 99024 POSTOP FOLLOW-UP VISIT: CPT | Mod: S$GLB,,, | Performed by: ORTHOPAEDIC SURGERY

## 2019-06-24 PROCEDURE — 99999 PR PBB SHADOW E&M-EST. PATIENT-LVL III: CPT | Mod: PBBFAC,,, | Performed by: ORTHOPAEDIC SURGERY

## 2019-06-24 PROCEDURE — 99999 PR PBB SHADOW E&M-EST. PATIENT-LVL III: ICD-10-PCS | Mod: PBBFAC,,, | Performed by: ORTHOPAEDIC SURGERY

## 2019-06-24 RX ORDER — ERGOCALCIFEROL 1.25 MG/1
50000 CAPSULE ORAL
COMMUNITY

## 2019-06-24 NOTE — PROGRESS NOTES
Letty Krueger is in for 3 month follow up for a right DAVID.  She is doing fairly well well.  Mild pain in the hip.  She has not quite resumed activities of daily living.      Exam demonstrates  A well developed female in no distress.  Alert and oriented.  Mood and affect are appropriate.    Hip incision is well healed.  There is a good, stable range of motion and leg lengths are clinically equal.  The extremity is neurovascularly intact.    Xrays demonstrate a well fixed and positioned prosthesis.    Imp:Doing fairly well      F/u in 3 months with xrays of her right hip.

## 2019-08-29 NOTE — TELEPHONE ENCOUNTER
Left voice message asking for a return call to schedule for bilateral L4 TF TREVON with Dr. Wolf,  Referred by Dr. Rae   4

## 2019-09-19 DIAGNOSIS — Z96.641 STATUS POST TOTAL HIP REPLACEMENT, RIGHT: Primary | ICD-10-CM

## 2019-09-30 ENCOUNTER — HOSPITAL ENCOUNTER (OUTPATIENT)
Dept: RADIOLOGY | Facility: HOSPITAL | Age: 74
Discharge: HOME | End: 2019-09-30
Attending: ORTHOPAEDIC SURGERY
Payer: MEDICARE

## 2019-09-30 ENCOUNTER — OFFICE VISIT (OUTPATIENT)
Dept: ORTHOPEDICS | Facility: CLINIC | Age: 74
End: 2019-09-30
Payer: MEDICARE

## 2019-09-30 VITALS — BODY MASS INDEX: 34.18 KG/M2 | HEIGHT: 62 IN | WEIGHT: 185.75 LBS

## 2019-09-30 DIAGNOSIS — M70.61 TROCHANTERIC BURSITIS, RIGHT HIP: Primary | ICD-10-CM

## 2019-09-30 DIAGNOSIS — Z96.641 STATUS POST TOTAL HIP REPLACEMENT, RIGHT: ICD-10-CM

## 2019-09-30 PROCEDURE — 99999 PR PBB SHADOW E&M-EST. PATIENT-LVL III: CPT | Mod: PBBFAC,,, | Performed by: ORTHOPAEDIC SURGERY

## 2019-09-30 PROCEDURE — 1101F PR PT FALLS ASSESS DOC 0-1 FALLS W/OUT INJ PAST YR: ICD-10-PCS | Mod: CPTII,S$GLB,, | Performed by: ORTHOPAEDIC SURGERY

## 2019-09-30 PROCEDURE — 1101F PT FALLS ASSESS-DOCD LE1/YR: CPT | Mod: CPTII,S$GLB,, | Performed by: ORTHOPAEDIC SURGERY

## 2019-09-30 PROCEDURE — 73502 X-RAY EXAM HIP UNI 2-3 VIEWS: CPT | Mod: 26,RT,, | Performed by: RADIOLOGY

## 2019-09-30 PROCEDURE — 99213 PR OFFICE/OUTPT VISIT, EST, LEVL III, 20-29 MIN: ICD-10-PCS | Mod: S$GLB,,, | Performed by: ORTHOPAEDIC SURGERY

## 2019-09-30 PROCEDURE — 73502 X-RAY EXAM HIP UNI 2-3 VIEWS: CPT | Mod: TC,RT

## 2019-09-30 PROCEDURE — 99999 PR PBB SHADOW E&M-EST. PATIENT-LVL III: ICD-10-PCS | Mod: PBBFAC,,, | Performed by: ORTHOPAEDIC SURGERY

## 2019-09-30 PROCEDURE — 99213 OFFICE O/P EST LOW 20 MIN: CPT | Mod: S$GLB,,, | Performed by: ORTHOPAEDIC SURGERY

## 2019-09-30 PROCEDURE — 73502 XR HIP 2 VIEW RIGHT: ICD-10-PCS | Mod: 26,RT,, | Performed by: RADIOLOGY

## 2019-09-30 RX ORDER — RIBOFLAVIN (VITAMIN B2) 100 MG
TABLET ORAL
COMMUNITY
Start: 2019-09-13 | End: 2022-07-15

## 2019-09-30 RX ORDER — DUREZOL 0.5 MG/ML
EMULSION OPHTHALMIC
COMMUNITY
Start: 2019-08-01 | End: 2022-05-04

## 2019-09-30 RX ORDER — MELOXICAM 15 MG/1
15 TABLET ORAL DAILY
Qty: 30 TABLET | Refills: 1 | Status: SHIPPED | OUTPATIENT
Start: 2019-09-30 | End: 2019-10-30

## 2019-09-30 RX ORDER — AMOXICILLIN AND CLAVULANATE POTASSIUM 875; 125 MG/1; MG/1
TABLET, FILM COATED ORAL
COMMUNITY
End: 2022-05-04

## 2019-09-30 RX ORDER — ACYCLOVIR 800 MG/1
TABLET ORAL
COMMUNITY
End: 2022-05-04

## 2019-09-30 RX ORDER — BESIFLOXACIN 6 MG/ML
SUSPENSION OPHTHALMIC
COMMUNITY
Start: 2019-08-01 | End: 2022-05-04

## 2019-09-30 RX ORDER — NEPAFENAC 3 MG/ML
SUSPENSION/ DROPS OPHTHALMIC
COMMUNITY
Start: 2019-09-04 | End: 2022-05-04

## 2019-09-30 NOTE — PROGRESS NOTES
"Subjective:      Patient ID: Letty Krueger is a 74 y.o. female.    Chief Complaint: Pain of the Right Hip    HPI  Letty Krueger has right hip pain.  The pain has worsened slightly. The pain is located in the lateral.  There  is radiation.  The pain radaites to the lateral mid thigh.   The pain is described as burning. The pain is aggravated by activity.  It is alleviated by rset.  There is not associated back pain.  Her history, medications and problem list were reviewed.    Review of Systems   Constitution: Negative for chills, fever and night sweats.   HENT: Negative for hearing loss.    Eyes: Negative for blurred vision and double vision.   Cardiovascular: Negative for chest pain, claudication and leg swelling.   Respiratory: Negative for shortness of breath.    Endocrine: Negative for polydipsia, polyphagia and polyuria.   Hematologic/Lymphatic: Negative for adenopathy and bleeding problem. Does not bruise/bleed easily.   Skin: Negative for poor wound healing.   Musculoskeletal: Positive for joint pain.   Gastrointestinal: Negative for diarrhea and heartburn.   Genitourinary: Negative for bladder incontinence.   Neurological: Negative for focal weakness, headaches, numbness, paresthesias and sensory change.   Psychiatric/Behavioral: The patient is not nervous/anxious.    Allergic/Immunologic: Negative for persistent infections.         Objective:      Body mass index is 33.97 kg/m².  Vitals:    09/30/19 1120   Weight: 84.2 kg (185 lb 11.8 oz)   Height: 5' 2" (1.575 m)           General    Constitutional: She is oriented to person, place, and time. She appears well-developed and well-nourished.   HENT:   Head: Normocephalic and atraumatic.   Eyes: EOM are normal.   Cardiovascular: Normal rate.    Pulmonary/Chest: Effort normal.   Neurological: She is alert and oriented to person, place, and time.   Psychiatric: She has a normal mood and affect. Her behavior is normal.     General Musculoskeletal " Exam   Gait: normal   Pelvic Obliquity: none      Right Knee Exam     Inspection   Alignment:  normal  Effusion: absent    Left Knee Exam     Inspection   Alignment:  normal  Effusion: absent    Right Hip Exam     Inspection   Scars: present  Swelling: absent  Bruising: absent  No deformity of hip.  Quadriceps Atrophy:  Negative  Erythema: absent    Crepitus   The patient has crepitus of the trochanteric bursa.    Range of Motion   Abduction: 25   Adduction: 20   Extension: 0   Flexion: 100   External rotation: 30   Internal rotation: 25     Tests   Pain w/ forced internal rotation (KELBY): absent  Stinchfield test: negative    Other   Sensation: normal  Left Hip Exam     Inspection   Scars: absent  Swelling: absent  No deformity of hip.  Quadriceps Atrophy:  negative  Erythema: absent  Bruising: absent    Range of Motion   Abduction: 25   Adduction: 20   Extension: 0   Flexion: 100   External rotation: 30   Internal rotation: 25     Tests   Pain w/ forced internal rotation (KELBY): absent  Stinchfield test: negative    Other   Sensation: normal      Back (L-Spine & T-Spine) / Neck (C-Spine) Exam   Back exam is normal.      Muscle Strength   Right Lower Extremity   Hip Abduction: 5/5   Hip Adduction: 5/5   Hip Flexion: 5/5   Ankle Dorsiflexion:  5/5   Left Lower Extremity   Hip Abduction: 5/5   Hip Adduction: 5/5   Hip Flexion: 5/5   Ankle Dorsiflexion:  5/5     Reflexes     Left Side  Quadriceps:  2+    Right Side   Quadriceps:  2+    Vascular Exam     Right Pulses  Dorsalis Pedis:      2+          Left Pulses  Dorsalis Pedis:      2+          Capillary Refill  Right Hand: normal capillary refill  Left Hand: normal capillary refill    Edema  Right Upper Leg: absent  Left Upper Leg: absent              Assessment:       Encounter Diagnosis   Name Primary?    Trochanteric bursitis, right hip Yes          Plan:       Letty was seen today for pain.    Diagnoses and all orders for this visit:    Trochanteric  bursitis, right hip    Other orders  -     meloxicam (MOBIC) 15 MG tablet; Take 1 tablet (15 mg total) by mouth once daily.      Letty Krueger was given a prescription for Meloxicam.  The patient was given instructions on how to take the medication and side effects were discussed.  F/U in 6 weeks.

## 2019-11-13 ENCOUNTER — OFFICE VISIT (OUTPATIENT)
Dept: ORTHOPEDICS | Facility: CLINIC | Age: 74
End: 2019-11-13
Payer: MEDICARE

## 2019-11-13 VITALS — BODY MASS INDEX: 34.59 KG/M2 | HEIGHT: 62 IN | WEIGHT: 187.94 LBS

## 2019-11-13 DIAGNOSIS — Z96.641 STATUS POST TOTAL HIP REPLACEMENT, RIGHT: ICD-10-CM

## 2019-11-13 DIAGNOSIS — M70.61 TROCHANTERIC BURSITIS, RIGHT HIP: Primary | ICD-10-CM

## 2019-11-13 PROCEDURE — 1101F PT FALLS ASSESS-DOCD LE1/YR: CPT | Mod: CPTII,S$GLB,, | Performed by: ORTHOPAEDIC SURGERY

## 2019-11-13 PROCEDURE — 1101F PR PT FALLS ASSESS DOC 0-1 FALLS W/OUT INJ PAST YR: ICD-10-PCS | Mod: CPTII,S$GLB,, | Performed by: ORTHOPAEDIC SURGERY

## 2019-11-13 PROCEDURE — 99213 OFFICE O/P EST LOW 20 MIN: CPT | Mod: 25,S$GLB,, | Performed by: ORTHOPAEDIC SURGERY

## 2019-11-13 PROCEDURE — 99213 PR OFFICE/OUTPT VISIT, EST, LEVL III, 20-29 MIN: ICD-10-PCS | Mod: 25,S$GLB,, | Performed by: ORTHOPAEDIC SURGERY

## 2019-11-13 PROCEDURE — 99999 PR PBB SHADOW E&M-EST. PATIENT-LVL III: ICD-10-PCS | Mod: PBBFAC,,, | Performed by: ORTHOPAEDIC SURGERY

## 2019-11-13 PROCEDURE — 99999 PR PBB SHADOW E&M-EST. PATIENT-LVL III: CPT | Mod: PBBFAC,,, | Performed by: ORTHOPAEDIC SURGERY

## 2019-11-13 PROCEDURE — 20610 DRAIN/INJ JOINT/BURSA W/O US: CPT | Mod: RT,S$GLB,, | Performed by: ORTHOPAEDIC SURGERY

## 2019-11-13 PROCEDURE — 20610 PR DRAIN/INJECT LARGE JOINT/BURSA: ICD-10-PCS | Mod: RT,S$GLB,, | Performed by: ORTHOPAEDIC SURGERY

## 2019-11-13 RX ORDER — TRIAMCINOLONE ACETONIDE 40 MG/ML
40 INJECTION, SUSPENSION INTRA-ARTICULAR; INTRAMUSCULAR
Status: COMPLETED | OUTPATIENT
Start: 2019-11-13 | End: 2019-11-13

## 2019-11-13 RX ADMIN — TRIAMCINOLONE ACETONIDE 40 MG: 40 INJECTION, SUSPENSION INTRA-ARTICULAR; INTRAMUSCULAR at 11:11

## 2019-11-13 NOTE — PROGRESS NOTES
" Subjective:      Patient ID: Letty Krueger is a 74 y.o. female.    Chief Complaint: Follow-up of the Right Hip    HPI  Ltety Krueger has right hip pain.  The pain is unchanged. No help with meloxicamThe pain is located in the lateral thigh.  There  is radiation.  The pain radaites to the leg.   The pain is described as achy. The pain is aggravated by walking and laying on it.  It is alleviated by rest.  There is not associated back pain.  Her history, medications and problem list were reviewed.    Review of Systems   Constitution: Negative for chills, fever and night sweats.   HENT: Negative for hearing loss.    Eyes: Negative for blurred vision and double vision.   Cardiovascular: Negative for chest pain, claudication and leg swelling.   Respiratory: Negative for shortness of breath.    Endocrine: Negative for polydipsia, polyphagia and polyuria.   Hematologic/Lymphatic: Negative for adenopathy and bleeding problem. Does not bruise/bleed easily.   Skin: Negative for poor wound healing.   Musculoskeletal: Positive for joint pain.   Gastrointestinal: Negative for diarrhea and heartburn.   Genitourinary: Negative for bladder incontinence.   Neurological: Negative for focal weakness, headaches, numbness, paresthesias and sensory change.   Psychiatric/Behavioral: The patient is not nervous/anxious.    Allergic/Immunologic: Negative for persistent infections.         Objective:      Body mass index is 34.38 kg/m².  Vitals:    11/13/19 1112   Weight: 85.2 kg (187 lb 15.1 oz)   Height: 5' 2" (1.575 m)           General    Constitutional: She is oriented to person, place, and time. She appears well-developed and well-nourished.   HENT:   Head: Normocephalic and atraumatic.   Eyes: EOM are normal.   Cardiovascular: Normal rate.    Pulmonary/Chest: Effort normal.   Neurological: She is alert and oriented to person, place, and time.   Psychiatric: She has a normal mood and affect. Her behavior is normal. "     General Musculoskeletal Exam   Gait: normal   Pelvic Obliquity: none      Right Knee Exam     Inspection   Alignment:  normal  Effusion: absent    Left Knee Exam     Inspection   Alignment:  normal  Effusion: absent    Right Hip Exam     Inspection   Scars: present  Swelling: absent  Bruising: absent  No deformity of hip.  Quadriceps Atrophy:  Negative  Erythema: absent    Tenderness   The patient tender to palpation of the trochanteric bursa.    Range of Motion   Abduction: 25   Adduction: 20   Extension: 0   Flexion: 100   External rotation: 30   Internal rotation: 25     Tests   Pain w/ forced internal rotation (KELBY): absent  Stinchfield test: negative    Other   Sensation: normal  Left Hip Exam     Inspection   Scars: absent  Swelling: absent  No deformity of hip.  Quadriceps Atrophy:  negative  Erythema: absent  Bruising: absent    Range of Motion   Abduction: 25   Adduction: 20   Extension: 0   Flexion: 100   External rotation: 30   Internal rotation: 25     Tests   Pain w/ forced internal rotation (KELBY): absent  Stinchfield test: negative    Other   Sensation: normal      Back (L-Spine & T-Spine) / Neck (C-Spine) Exam   Back exam is normal.      Muscle Strength   Right Lower Extremity   Hip Abduction: 5/5   Hip Adduction: 5/5   Hip Flexion: 5/5   Ankle Dorsiflexion:  5/5   Left Lower Extremity   Hip Abduction: 5/5   Hip Adduction: 5/5   Hip Flexion: 5/5   Ankle Dorsiflexion:  5/5     Reflexes     Left Side  Quadriceps:  2+    Right Side   Quadriceps:  2+    Vascular Exam     Right Pulses  Dorsalis Pedis:      2+          Left Pulses  Dorsalis Pedis:      2+          Capillary Refill  Right Hand: normal capillary refill  Left Hand: normal capillary refill    Edema  Right Upper Leg: absent  Left Upper Leg: absent              Assessment:       Encounter Diagnoses   Name Primary?    Trochanteric bursitis, right hip Yes    Status post total hip replacement, 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           Plan:       Letty  was seen today for follow-up.    Diagnoses and all orders for this visit:    Trochanteric bursitis, right hip    Status post total hip replacement, 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    Other orders  -     triamcinolone acetonide injection 40 mg        Treatment options were discussed. Verbal consent was obtained.   After time out was performed and patient ID, side, and site were verified, the right hip was sterilely prepped in the standard fashion.  A 22-gauge needle was introduced intothe trochanteric bursa without complication.The bursa was then injected with 40 mg Kenalog and 9 cc 1% plain lidocaine.  Sterile dressing was applied.  The patient was informed that they may resume activities as tolerated.  Elevation of glucose in diabetic patients was discussed.    F/U in 6 weeks

## 2019-12-23 ENCOUNTER — OFFICE VISIT (OUTPATIENT)
Dept: ORTHOPEDICS | Facility: CLINIC | Age: 74
End: 2019-12-23
Payer: MEDICARE

## 2019-12-23 VITALS — BODY MASS INDEX: 33.87 KG/M2 | WEIGHT: 184.06 LBS | HEIGHT: 62 IN

## 2019-12-23 DIAGNOSIS — M70.61 TROCHANTERIC BURSITIS, RIGHT HIP: Primary | ICD-10-CM

## 2019-12-23 PROCEDURE — 1101F PR PT FALLS ASSESS DOC 0-1 FALLS W/OUT INJ PAST YR: ICD-10-PCS | Mod: CPTII,S$GLB,, | Performed by: ORTHOPAEDIC SURGERY

## 2019-12-23 PROCEDURE — 1159F PR MEDICATION LIST DOCUMENTED IN MEDICAL RECORD: ICD-10-PCS | Mod: S$GLB,,, | Performed by: ORTHOPAEDIC SURGERY

## 2019-12-23 PROCEDURE — 99999 PR PBB SHADOW E&M-EST. PATIENT-LVL III: ICD-10-PCS | Mod: PBBFAC,,, | Performed by: ORTHOPAEDIC SURGERY

## 2019-12-23 PROCEDURE — 1125F PR PAIN SEVERITY QUANTIFIED, PAIN PRESENT: ICD-10-PCS | Mod: S$GLB,,, | Performed by: ORTHOPAEDIC SURGERY

## 2019-12-23 PROCEDURE — 99999 PR PBB SHADOW E&M-EST. PATIENT-LVL III: CPT | Mod: PBBFAC,,, | Performed by: ORTHOPAEDIC SURGERY

## 2019-12-23 PROCEDURE — 99213 PR OFFICE/OUTPT VISIT, EST, LEVL III, 20-29 MIN: ICD-10-PCS | Mod: S$GLB,,, | Performed by: ORTHOPAEDIC SURGERY

## 2019-12-23 PROCEDURE — 1125F AMNT PAIN NOTED PAIN PRSNT: CPT | Mod: S$GLB,,, | Performed by: ORTHOPAEDIC SURGERY

## 2019-12-23 PROCEDURE — 1101F PT FALLS ASSESS-DOCD LE1/YR: CPT | Mod: CPTII,S$GLB,, | Performed by: ORTHOPAEDIC SURGERY

## 2019-12-23 PROCEDURE — 99213 OFFICE O/P EST LOW 20 MIN: CPT | Mod: S$GLB,,, | Performed by: ORTHOPAEDIC SURGERY

## 2019-12-23 PROCEDURE — 1159F MED LIST DOCD IN RCRD: CPT | Mod: S$GLB,,, | Performed by: ORTHOPAEDIC SURGERY

## 2019-12-23 NOTE — PROGRESS NOTES
"Subjective:      Patient ID: Letty Krueger is a 74 y.o. female.    Chief Complaint: Follow-up of the Right Hip    HPI  Letty Krueger has bilateral hip pain.  The pain has only slightly improved. The pain is located in the lateral.  There  is not radiation.  The pain is described as achy. The pain is aggravated by activity.  It is alleviated by rest.  There is not associated back pain.  His history, medications and problem list were reviewed.    Review of Systems   Constitution: Negative for chills, fever and night sweats.   HENT: Negative for hearing loss.    Eyes: Negative for blurred vision and double vision.   Cardiovascular: Negative for chest pain, claudication and leg swelling.   Respiratory: Negative for shortness of breath.    Endocrine: Negative for polydipsia, polyphagia and polyuria.   Hematologic/Lymphatic: Negative for adenopathy and bleeding problem. Does not bruise/bleed easily.   Skin: Negative for poor wound healing.   Musculoskeletal: Positive for joint pain.   Gastrointestinal: Negative for diarrhea and heartburn.   Genitourinary: Negative for bladder incontinence.   Neurological: Negative for focal weakness, headaches, numbness, paresthesias and sensory change.   Psychiatric/Behavioral: The patient is not nervous/anxious.    Allergic/Immunologic: Negative for persistent infections.         Objective:      Body mass index is 33.67 kg/m².  Vitals:    12/23/19 1329   Weight: 83.5 kg (184 lb 1.4 oz)   Height: 5' 2" (1.575 m)           Ortho/SPM Exam            Assessment:       Encounter Diagnosis   Name Primary?    Trochanteric bursitis, right hip Yes          Plan:       Letty was seen today for follow-up.    Diagnoses and all orders for this visit:    Trochanteric bursitis, right hip  -     MRI Hip Without Contrast Right; Future        Options discussed.  WIll get MRI right hip.  No help with injection, activity modification.         "

## 2019-12-31 ENCOUNTER — HOSPITAL ENCOUNTER (OUTPATIENT)
Dept: RADIOLOGY | Facility: HOSPITAL | Age: 74
Discharge: HOME | End: 2019-12-31
Attending: ORTHOPAEDIC SURGERY
Payer: MEDICARE

## 2019-12-31 DIAGNOSIS — M70.61 TROCHANTERIC BURSITIS, RIGHT HIP: ICD-10-CM

## 2019-12-31 PROCEDURE — 73721 MRI JNT OF LWR EXTRE W/O DYE: CPT | Mod: 26,RT,, | Performed by: RADIOLOGY

## 2019-12-31 PROCEDURE — 73721 MRI JNT OF LWR EXTRE W/O DYE: CPT | Mod: TC,RT

## 2019-12-31 PROCEDURE — 73721 MRI HIP WITHOUT CONTRAST RIGHT: ICD-10-PCS | Mod: 26,RT,, | Performed by: RADIOLOGY

## 2020-01-07 ENCOUNTER — TELEPHONE (OUTPATIENT)
Dept: ORTHOPEDICS | Facility: CLINIC | Age: 75
End: 2020-01-07

## 2020-01-07 NOTE — TELEPHONE ENCOUNTER
I tried calling pr. No answer I also LVM.         ----- Message from Stan Aguilar MD sent at 1/2/2020  6:54 AM CST -----  Please call pt.  The MRI showed bursitis. Please schedule f/u to discuss options.  Also, please have her schedule an appt with her gyn or her primary doctor.  The lining of her uterus was thick and she needs to get this checked with an ultrasound that they can order for her.  GC

## 2020-01-08 ENCOUNTER — TELEPHONE (OUTPATIENT)
Dept: ORTHOPEDICS | Facility: CLINIC | Age: 75
End: 2020-01-08

## 2020-01-08 NOTE — TELEPHONE ENCOUNTER
I tried calling pt. Back no answer I LVM.        ----- Message from Brenda Willson MA sent at 1/8/2020  8:54 AM CST -----  Contact: Self/[phone]  PT stated she missed a call in reference to her results.

## 2020-01-08 NOTE — TELEPHONE ENCOUNTER
Spoke with pt. I informed pt. Of her MRI results. Pt. Said that she will get back with me to schedule her f/u appointment at a later date because she will be leaving out of town to go see her children. Pt. Had no further questions.              ----- Message from Jhony Dumont sent at 1/8/2020  3:16 PM CST -----  Contact: Pt  Pt called and would like the results of her MRI.     Pt can be reached at [phone].     If you miss her, would you please send her results in the mail?

## 2021-02-12 ENCOUNTER — LAB VISIT (OUTPATIENT)
Dept: LAB | Facility: OTHER | Age: 76
End: 2021-02-12
Attending: INTERNAL MEDICINE
Payer: OTHER GOVERNMENT

## 2021-02-12 DIAGNOSIS — Z20.822 ENCOUNTER FOR LABORATORY TESTING FOR COVID-19 VIRUS: ICD-10-CM

## 2021-02-12 PROCEDURE — U0003 INFECTIOUS AGENT DETECTION BY NUCLEIC ACID (DNA OR RNA); SEVERE ACUTE RESPIRATORY SYNDROME CORONAVIRUS 2 (SARS-COV-2) (CORONAVIRUS DISEASE [COVID-19]), AMPLIFIED PROBE TECHNIQUE, MAKING USE OF HIGH THROUGHPUT TECHNOLOGIES AS DESCRIBED BY CMS-2020-01-R: HCPCS

## 2021-02-15 LAB — SARS-COV-2 RNA RESP QL NAA+PROBE: NOT DETECTED

## 2021-07-16 ENCOUNTER — OFFICE VISIT (OUTPATIENT)
Dept: SPORTS MEDICINE | Facility: CLINIC | Age: 76
End: 2021-07-16
Payer: MEDICARE

## 2021-07-16 ENCOUNTER — HOSPITAL ENCOUNTER (OUTPATIENT)
Dept: RADIOLOGY | Facility: HOSPITAL | Age: 76
Discharge: HOME | End: 2021-07-16
Attending: STUDENT IN AN ORGANIZED HEALTH CARE EDUCATION/TRAINING PROGRAM
Payer: MEDICARE

## 2021-07-16 VITALS
HEART RATE: 77 BPM | SYSTOLIC BLOOD PRESSURE: 123 MMHG | BODY MASS INDEX: 35.15 KG/M2 | DIASTOLIC BLOOD PRESSURE: 70 MMHG | WEIGHT: 191 LBS | HEIGHT: 62 IN

## 2021-07-16 DIAGNOSIS — M25.551 CHRONIC RIGHT HIP PAIN: Primary | ICD-10-CM

## 2021-07-16 DIAGNOSIS — M25.551 RIGHT HIP PAIN: ICD-10-CM

## 2021-07-16 DIAGNOSIS — G89.29 CHRONIC RIGHT HIP PAIN: Primary | ICD-10-CM

## 2021-07-16 DIAGNOSIS — M25.551 GREATER TROCHANTERIC PAIN SYNDROME OF RIGHT LOWER EXTREMITY: ICD-10-CM

## 2021-07-16 PROCEDURE — 99204 OFFICE O/P NEW MOD 45 MIN: CPT | Mod: 25,S$GLB,, | Performed by: STUDENT IN AN ORGANIZED HEALTH CARE EDUCATION/TRAINING PROGRAM

## 2021-07-16 PROCEDURE — 97110 PR THERAPEUTIC EXERCISES: ICD-10-PCS | Mod: GP,S$GLB,, | Performed by: STUDENT IN AN ORGANIZED HEALTH CARE EDUCATION/TRAINING PROGRAM

## 2021-07-16 PROCEDURE — 73502 X-RAY EXAM HIP UNI 2-3 VIEWS: CPT | Mod: TC,RT

## 2021-07-16 PROCEDURE — 99999 PR PBB SHADOW E&M-EST. PATIENT-LVL III: ICD-10-PCS | Mod: PBBFAC,,, | Performed by: STUDENT IN AN ORGANIZED HEALTH CARE EDUCATION/TRAINING PROGRAM

## 2021-07-16 PROCEDURE — 3288F PR FALLS RISK ASSESSMENT DOCUMENTED: ICD-10-PCS | Mod: CPTII,S$GLB,, | Performed by: STUDENT IN AN ORGANIZED HEALTH CARE EDUCATION/TRAINING PROGRAM

## 2021-07-16 PROCEDURE — 1101F PR PT FALLS ASSESS DOC 0-1 FALLS W/OUT INJ PAST YR: ICD-10-PCS | Mod: CPTII,S$GLB,, | Performed by: STUDENT IN AN ORGANIZED HEALTH CARE EDUCATION/TRAINING PROGRAM

## 2021-07-16 PROCEDURE — 1125F AMNT PAIN NOTED PAIN PRSNT: CPT | Mod: S$GLB,,, | Performed by: STUDENT IN AN ORGANIZED HEALTH CARE EDUCATION/TRAINING PROGRAM

## 2021-07-16 PROCEDURE — 99999 PR PBB SHADOW E&M-EST. PATIENT-LVL III: CPT | Mod: PBBFAC,,, | Performed by: STUDENT IN AN ORGANIZED HEALTH CARE EDUCATION/TRAINING PROGRAM

## 2021-07-16 PROCEDURE — 20611 DRAIN/INJ JOINT/BURSA W/US: CPT | Mod: RT,S$GLB,, | Performed by: STUDENT IN AN ORGANIZED HEALTH CARE EDUCATION/TRAINING PROGRAM

## 2021-07-16 PROCEDURE — 1101F PT FALLS ASSESS-DOCD LE1/YR: CPT | Mod: CPTII,S$GLB,, | Performed by: STUDENT IN AN ORGANIZED HEALTH CARE EDUCATION/TRAINING PROGRAM

## 2021-07-16 PROCEDURE — 99204 PR OFFICE/OUTPT VISIT, NEW, LEVL IV, 45-59 MIN: ICD-10-PCS | Mod: 25,S$GLB,, | Performed by: STUDENT IN AN ORGANIZED HEALTH CARE EDUCATION/TRAINING PROGRAM

## 2021-07-16 PROCEDURE — 20611 LARGE JOINT ASPIRATION/INJECTION: R GREATER TROCHANTERIC BURSA: ICD-10-PCS | Mod: RT,S$GLB,, | Performed by: STUDENT IN AN ORGANIZED HEALTH CARE EDUCATION/TRAINING PROGRAM

## 2021-07-16 PROCEDURE — 1125F PR PAIN SEVERITY QUANTIFIED, PAIN PRESENT: ICD-10-PCS | Mod: S$GLB,,, | Performed by: STUDENT IN AN ORGANIZED HEALTH CARE EDUCATION/TRAINING PROGRAM

## 2021-07-16 PROCEDURE — 97110 THERAPEUTIC EXERCISES: CPT | Mod: GP,S$GLB,, | Performed by: STUDENT IN AN ORGANIZED HEALTH CARE EDUCATION/TRAINING PROGRAM

## 2021-07-16 PROCEDURE — 73502 X-RAY EXAM HIP UNI 2-3 VIEWS: CPT | Mod: 26,RT,, | Performed by: RADIOLOGY

## 2021-07-16 PROCEDURE — 1159F MED LIST DOCD IN RCRD: CPT | Mod: S$GLB,,, | Performed by: STUDENT IN AN ORGANIZED HEALTH CARE EDUCATION/TRAINING PROGRAM

## 2021-07-16 PROCEDURE — 73502 XR HIP WITH PELVIS WHEN PERFORMED, 2 OR 3  VIEWS RIGHT: ICD-10-PCS | Mod: 26,RT,, | Performed by: RADIOLOGY

## 2021-07-16 PROCEDURE — 1159F PR MEDICATION LIST DOCUMENTED IN MEDICAL RECORD: ICD-10-PCS | Mod: S$GLB,,, | Performed by: STUDENT IN AN ORGANIZED HEALTH CARE EDUCATION/TRAINING PROGRAM

## 2021-07-16 PROCEDURE — 3288F FALL RISK ASSESSMENT DOCD: CPT | Mod: CPTII,S$GLB,, | Performed by: STUDENT IN AN ORGANIZED HEALTH CARE EDUCATION/TRAINING PROGRAM

## 2021-07-16 RX ORDER — TRIAMCINOLONE ACETONIDE 40 MG/ML
40 INJECTION, SUSPENSION INTRA-ARTICULAR; INTRAMUSCULAR
Status: DISCONTINUED | OUTPATIENT
Start: 2021-07-16 | End: 2021-07-16 | Stop reason: HOSPADM

## 2021-07-16 RX ADMIN — TRIAMCINOLONE ACETONIDE 40 MG: 40 INJECTION, SUSPENSION INTRA-ARTICULAR; INTRAMUSCULAR at 01:07

## 2021-08-27 ENCOUNTER — TELEPHONE (OUTPATIENT)
Dept: SPORTS MEDICINE | Facility: CLINIC | Age: 76
End: 2021-08-27

## 2021-09-01 ENCOUNTER — TELEPHONE (OUTPATIENT)
Dept: SPORTS MEDICINE | Facility: CLINIC | Age: 76
End: 2021-09-01

## 2021-10-13 ENCOUNTER — TELEPHONE (OUTPATIENT)
Dept: SPORTS MEDICINE | Facility: CLINIC | Age: 76
End: 2021-10-13

## 2021-10-15 ENCOUNTER — OFFICE VISIT (OUTPATIENT)
Dept: SPORTS MEDICINE | Facility: CLINIC | Age: 76
End: 2021-10-15
Payer: MEDICARE

## 2021-10-15 VITALS
SYSTOLIC BLOOD PRESSURE: 124 MMHG | WEIGHT: 186 LBS | HEIGHT: 62 IN | TEMPERATURE: 98 F | HEART RATE: 68 BPM | DIASTOLIC BLOOD PRESSURE: 75 MMHG | BODY MASS INDEX: 34.23 KG/M2

## 2021-10-15 DIAGNOSIS — G89.29 CHRONIC RIGHT HIP PAIN: Primary | ICD-10-CM

## 2021-10-15 DIAGNOSIS — M25.551 GREATER TROCHANTERIC PAIN SYNDROME OF RIGHT LOWER EXTREMITY: ICD-10-CM

## 2021-10-15 DIAGNOSIS — M25.551 CHRONIC RIGHT HIP PAIN: Primary | ICD-10-CM

## 2021-10-15 PROCEDURE — 1125F AMNT PAIN NOTED PAIN PRSNT: CPT | Mod: CPTII,S$GLB,, | Performed by: STUDENT IN AN ORGANIZED HEALTH CARE EDUCATION/TRAINING PROGRAM

## 2021-10-15 PROCEDURE — 3078F PR MOST RECENT DIASTOLIC BLOOD PRESSURE < 80 MM HG: ICD-10-PCS | Mod: CPTII,S$GLB,, | Performed by: STUDENT IN AN ORGANIZED HEALTH CARE EDUCATION/TRAINING PROGRAM

## 2021-10-15 PROCEDURE — 1159F MED LIST DOCD IN RCRD: CPT | Mod: CPTII,S$GLB,, | Performed by: STUDENT IN AN ORGANIZED HEALTH CARE EDUCATION/TRAINING PROGRAM

## 2021-10-15 PROCEDURE — 4010F ACE/ARB THERAPY RXD/TAKEN: CPT | Mod: CPTII,S$GLB,, | Performed by: STUDENT IN AN ORGANIZED HEALTH CARE EDUCATION/TRAINING PROGRAM

## 2021-10-15 PROCEDURE — 4010F PR ACE/ARB THEARPY RXD/TAKEN: ICD-10-PCS | Mod: CPTII,S$GLB,, | Performed by: STUDENT IN AN ORGANIZED HEALTH CARE EDUCATION/TRAINING PROGRAM

## 2021-10-15 PROCEDURE — 3288F PR FALLS RISK ASSESSMENT DOCUMENTED: ICD-10-PCS | Mod: CPTII,S$GLB,, | Performed by: STUDENT IN AN ORGANIZED HEALTH CARE EDUCATION/TRAINING PROGRAM

## 2021-10-15 PROCEDURE — 3074F SYST BP LT 130 MM HG: CPT | Mod: CPTII,S$GLB,, | Performed by: STUDENT IN AN ORGANIZED HEALTH CARE EDUCATION/TRAINING PROGRAM

## 2021-10-15 PROCEDURE — 1159F PR MEDICATION LIST DOCUMENTED IN MEDICAL RECORD: ICD-10-PCS | Mod: CPTII,S$GLB,, | Performed by: STUDENT IN AN ORGANIZED HEALTH CARE EDUCATION/TRAINING PROGRAM

## 2021-10-15 PROCEDURE — 1101F PR PT FALLS ASSESS DOC 0-1 FALLS W/OUT INJ PAST YR: ICD-10-PCS | Mod: CPTII,S$GLB,, | Performed by: STUDENT IN AN ORGANIZED HEALTH CARE EDUCATION/TRAINING PROGRAM

## 2021-10-15 PROCEDURE — 1160F RVW MEDS BY RX/DR IN RCRD: CPT | Mod: CPTII,S$GLB,, | Performed by: STUDENT IN AN ORGANIZED HEALTH CARE EDUCATION/TRAINING PROGRAM

## 2021-10-15 PROCEDURE — 20611 DRAIN/INJ JOINT/BURSA W/US: CPT | Mod: RT,S$GLB,, | Performed by: STUDENT IN AN ORGANIZED HEALTH CARE EDUCATION/TRAINING PROGRAM

## 2021-10-15 PROCEDURE — 1125F PR PAIN SEVERITY QUANTIFIED, PAIN PRESENT: ICD-10-PCS | Mod: CPTII,S$GLB,, | Performed by: STUDENT IN AN ORGANIZED HEALTH CARE EDUCATION/TRAINING PROGRAM

## 2021-10-15 PROCEDURE — 3288F FALL RISK ASSESSMENT DOCD: CPT | Mod: CPTII,S$GLB,, | Performed by: STUDENT IN AN ORGANIZED HEALTH CARE EDUCATION/TRAINING PROGRAM

## 2021-10-15 PROCEDURE — 99214 PR OFFICE/OUTPT VISIT, EST, LEVL IV, 30-39 MIN: ICD-10-PCS | Mod: 25,S$GLB,, | Performed by: STUDENT IN AN ORGANIZED HEALTH CARE EDUCATION/TRAINING PROGRAM

## 2021-10-15 PROCEDURE — 20611 LARGE JOINT ASPIRATION/INJECTION: R GREATER TROCHANTERIC BURSA: ICD-10-PCS | Mod: RT,S$GLB,, | Performed by: STUDENT IN AN ORGANIZED HEALTH CARE EDUCATION/TRAINING PROGRAM

## 2021-10-15 PROCEDURE — 3078F DIAST BP <80 MM HG: CPT | Mod: CPTII,S$GLB,, | Performed by: STUDENT IN AN ORGANIZED HEALTH CARE EDUCATION/TRAINING PROGRAM

## 2021-10-15 PROCEDURE — 99999 PR PBB SHADOW E&M-EST. PATIENT-LVL III: ICD-10-PCS | Mod: PBBFAC,,, | Performed by: STUDENT IN AN ORGANIZED HEALTH CARE EDUCATION/TRAINING PROGRAM

## 2021-10-15 PROCEDURE — 99214 OFFICE O/P EST MOD 30 MIN: CPT | Mod: 25,S$GLB,, | Performed by: STUDENT IN AN ORGANIZED HEALTH CARE EDUCATION/TRAINING PROGRAM

## 2021-10-15 PROCEDURE — 1160F PR REVIEW ALL MEDS BY PRESCRIBER/CLIN PHARMACIST DOCUMENTED: ICD-10-PCS | Mod: CPTII,S$GLB,, | Performed by: STUDENT IN AN ORGANIZED HEALTH CARE EDUCATION/TRAINING PROGRAM

## 2021-10-15 PROCEDURE — 99999 PR PBB SHADOW E&M-EST. PATIENT-LVL III: CPT | Mod: PBBFAC,,, | Performed by: STUDENT IN AN ORGANIZED HEALTH CARE EDUCATION/TRAINING PROGRAM

## 2021-10-15 PROCEDURE — 1101F PT FALLS ASSESS-DOCD LE1/YR: CPT | Mod: CPTII,S$GLB,, | Performed by: STUDENT IN AN ORGANIZED HEALTH CARE EDUCATION/TRAINING PROGRAM

## 2021-10-15 PROCEDURE — 3074F PR MOST RECENT SYSTOLIC BLOOD PRESSURE < 130 MM HG: ICD-10-PCS | Mod: CPTII,S$GLB,, | Performed by: STUDENT IN AN ORGANIZED HEALTH CARE EDUCATION/TRAINING PROGRAM

## 2021-10-15 RX ORDER — DICLOFENAC SODIUM 10 MG/G
2 GEL TOPICAL 4 TIMES DAILY
Qty: 1 TUBE | Refills: 3 | Status: SHIPPED | OUTPATIENT
Start: 2021-10-15

## 2021-10-15 RX ORDER — TRIAMCINOLONE ACETONIDE 40 MG/ML
40 INJECTION, SUSPENSION INTRA-ARTICULAR; INTRAMUSCULAR
Status: DISCONTINUED | OUTPATIENT
Start: 2021-10-15 | End: 2021-10-15 | Stop reason: HOSPADM

## 2021-10-15 RX ADMIN — TRIAMCINOLONE ACETONIDE 40 MG: 40 INJECTION, SUSPENSION INTRA-ARTICULAR; INTRAMUSCULAR at 11:10

## 2021-12-29 DIAGNOSIS — M79.605 LEG PAIN, BILATERAL: ICD-10-CM

## 2021-12-29 DIAGNOSIS — M48.061 SPINAL STENOSIS, LUMBAR: Primary | ICD-10-CM

## 2021-12-29 DIAGNOSIS — M24.28 LIGAMENTUM FLAVUM HYPERTROPHY: ICD-10-CM

## 2021-12-29 DIAGNOSIS — M79.604 LEG PAIN, BILATERAL: ICD-10-CM

## 2022-02-07 ENCOUNTER — TELEPHONE (OUTPATIENT)
Dept: NEUROLOGY | Facility: CLINIC | Age: 77
End: 2022-02-07
Payer: MEDICARE

## 2022-02-07 NOTE — TELEPHONE ENCOUNTER
Unable to leave message for patient on vm.  Printed and mailed letter informing patient that her EMG Procedure from 2/28/22 has been rescheduled. The new date is 3/24/22 at 11am.

## 2022-03-24 ENCOUNTER — PROCEDURE VISIT (OUTPATIENT)
Dept: NEUROLOGY | Facility: CLINIC | Age: 77
End: 2022-03-24
Payer: MEDICARE

## 2022-03-24 DIAGNOSIS — M79.604 LEG PAIN, BILATERAL: ICD-10-CM

## 2022-03-24 DIAGNOSIS — M24.28 LIGAMENTUM FLAVUM HYPERTROPHY: ICD-10-CM

## 2022-03-24 DIAGNOSIS — M79.605 LEG PAIN, BILATERAL: ICD-10-CM

## 2022-03-24 DIAGNOSIS — M48.061 SPINAL STENOSIS, LUMBAR: ICD-10-CM

## 2022-03-24 PROCEDURE — 95886 PR EMG COMPLETE, W/ NERVE CONDUCTION STUDIES, 5+ MUSCLES: ICD-10-PCS | Mod: S$GLB,,, | Performed by: PSYCHIATRY & NEUROLOGY

## 2022-03-24 PROCEDURE — 95911 PR NERVE CONDUCTION STUDY; 9-10 STUDIES: ICD-10-PCS | Mod: S$GLB,,, | Performed by: PSYCHIATRY & NEUROLOGY

## 2022-03-24 PROCEDURE — 95911 NRV CNDJ TEST 9-10 STUDIES: CPT | Mod: S$GLB,,, | Performed by: PSYCHIATRY & NEUROLOGY

## 2022-03-24 PROCEDURE — 95886 MUSC TEST DONE W/N TEST COMP: CPT | Mod: S$GLB,,, | Performed by: PSYCHIATRY & NEUROLOGY

## 2022-04-13 ENCOUNTER — TELEPHONE (OUTPATIENT)
Dept: DERMATOLOGY | Facility: CLINIC | Age: 77
End: 2022-04-13
Payer: MEDICARE

## 2022-04-13 NOTE — TELEPHONE ENCOUNTER
Spoke with pt - pt scheduled    ----- Message from Roxane Avalos MD sent at 4/12/2022  9:22 AM CDT -----  Regarding: RE: External Referral  Yes. Any MD. JAM  ----- Message -----  From: Mariana Badillo LPN  Sent: 4/12/2022   8:44 AM CDT  To: Roxane Avalos MD  Subject: FW: External Referral                            Is this seen in dermatology?  ----- Message -----  From: Mariana Badillo LPN  Sent: 4/11/2022   5:07 PM CDT  To: Mariana Badillo LPN  Subject: FW: External Referral                              ----- Message -----  From: Kiara Healy  Sent: 4/11/2022   2:35 PM CDT  To: [facility] Clinical Staff  Subject: External Referral                                Good afternoon,     Dr. Carl Hughes referring patient for unilateral lip swelling and attempting to rule out Melkersson-Clint syndrome. Referral and notes scanned into media mgr. Is this diagnosis seen in Dermatology? Can you please assist patient?      Thank you,     Kiara Quintana

## 2022-05-04 ENCOUNTER — OFFICE VISIT (OUTPATIENT)
Dept: DERMATOLOGY | Facility: CLINIC | Age: 77
End: 2022-05-04
Payer: MEDICARE

## 2022-05-04 DIAGNOSIS — R22.0 LIP SWELLING: Primary | ICD-10-CM

## 2022-05-04 PROCEDURE — 99203 PR OFFICE/OUTPT VISIT, NEW, LEVL III, 30-44 MIN: ICD-10-PCS | Mod: S$GLB,,, | Performed by: DERMATOLOGY

## 2022-05-04 PROCEDURE — 99203 OFFICE O/P NEW LOW 30 MIN: CPT | Mod: S$GLB,,, | Performed by: DERMATOLOGY

## 2022-05-04 PROCEDURE — 99999 PR PBB SHADOW E&M-EST. PATIENT-LVL III: CPT | Mod: PBBFAC,,,

## 2022-05-04 PROCEDURE — 99999 PR PBB SHADOW E&M-EST. PATIENT-LVL III: ICD-10-PCS | Mod: PBBFAC,,,

## 2022-05-04 RX ORDER — LOSARTAN POTASSIUM 50 MG/1
100 TABLET ORAL
COMMUNITY
End: 2022-07-14

## 2022-05-04 NOTE — PROGRESS NOTES
Subjective:       Patient ID:  Letty Krueger is a 77 y.o. female who presents for lip swelling.    76 yo F with history of HTN and HLD presents for evaluation of swelling in the lips. Patient reports she noticed the lip swelling in January 2022 occurring mostly in her upper lip on the left side. Also reports a history of chronic right eye swelling that started in 2001. She had surgery to correct this in 2012 but continued to get some mild swelling around that eye and milder swelling on the left eye. She was seen by ENT 2 months ago who recommended switching off of her lisinopril and she was able to get her PCP to change this to losartan. She has not noticed any improvement since. 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 Denies any facial palsy or paresis.    Review of Systems   Constitutional: Negative.    HENT: Positive for lip swelling. Negative for mouth sores.    Gastrointestinal: Negative for nausea, vomiting, abdominal pain and diarrhea.   Skin: Negative for itching and rash.        Objective:    Physical Exam   Constitutional: She appears well-developed and well-nourished.   Neurological: She is alert.   Skin:   Areas Examined (abnormalities noted in diagram):   Head / Face Inspection Performed              Diagram Legend     Erythematous scaling macule/papule c/w actinic keratosis       Vascular papule c/w angioma      Pigmented verrucoid papule/plaque c/w seborrheic keratosis      Yellow umbilicated papule c/w sebaceous hyperplasia      Irregularly shaped tan macule c/w lentigo     1-2 mm smooth white papules consistent with Milia      Movable subcutaneous cyst with punctum c/w epidermal inclusion cyst      Subcutaneous movable cyst c/w pilar cyst      Firm pink to brown papule c/w dermatofibroma                  Pedunculated fleshy papule(s) c/w skin tag(s)      Evenly pigmented macule c/w junctional  nevus     Mildly variegated pigmented, slightly irregular-bordered macule c/w mildly atypical nevus      Flesh colored to evenly pigmented papule c/w intradermal nevus       Pink pearly papule/plaque c/w basal cell carcinoma      Erythematous hyperkeratotic cursted plaque c/w SCC      Surgical scar with no sign of skin cancer recurrence      Open and closed comedones      Inflammatory papules and pustules      Verrucoid papule consistent consistent with wart     Erythematous eczematous patches and plaques     Dystrophic onycholytic nail with subungual debris c/w onychomycosis     Umbilicated papule    Erythematous-base heme-crusted tan verrucoid plaque consistent with inflamed seborrheic keratosis     Erythematous Silvery Scaling Plaque c/w Psoriasis     See annotation      Assessment / Plan:        Lip swelling  -     Ambulatory referral/consult to ENT; Future; Expected date: 05/11/2022  -     Ambulatory referral/consult to Allergy; Future; Expected date: 05/11/2022    Patient with asymmetric upper lip swelling and intermittent periocular edema 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 Less concern for Crohn's given no GI symptoms and associated periocular edema.  Pt does have fissured tongue which is a part of this syndrome.  She denies any history of facial palsy.  -Refer to ENT for evaluation and to consider lip biopsy to evaluate for possible Melkersson-Clint syndrome vs granulomatous cheilitis  -Refer to Allergy to eval for workup of chronic angioedema vs other etiologies  -Follow up as needed           No follow-ups on file.

## 2022-05-05 ENCOUNTER — TELEPHONE (OUTPATIENT)
Dept: ALLERGY | Facility: CLINIC | Age: 77
End: 2022-05-05
Payer: MEDICARE

## 2022-05-05 NOTE — TELEPHONE ENCOUNTER
----- Message from Vandana Max sent at 5/4/2022  2:29 PM CDT -----  Regarding: speak with nurse  Contact: patient  [phone]  please call patient being referred for lip swelling waiting on a call back from the nurse thanks.

## 2022-05-06 ENCOUNTER — TELEPHONE (OUTPATIENT)
Dept: OTOLARYNGOLOGY | Facility: CLINIC | Age: 77
End: 2022-05-06
Payer: MEDICARE

## 2022-05-09 ENCOUNTER — LAB VISIT (OUTPATIENT)
Dept: LAB | Facility: HOSPITAL | Age: 77
End: 2022-05-09
Attending: NURSE PRACTITIONER
Payer: MEDICARE

## 2022-05-09 ENCOUNTER — OFFICE VISIT (OUTPATIENT)
Dept: ALLERGY | Facility: CLINIC | Age: 77
End: 2022-05-09
Payer: MEDICARE

## 2022-05-09 VITALS — HEIGHT: 62 IN | BODY MASS INDEX: 34.61 KG/M2 | WEIGHT: 188.06 LBS

## 2022-05-09 DIAGNOSIS — R23.8 OTHER SKIN CHANGES: ICD-10-CM

## 2022-05-09 DIAGNOSIS — H57.89 PERIORBITAL SWELLING: ICD-10-CM

## 2022-05-09 DIAGNOSIS — R22.0 LIP SWELLING: Primary | ICD-10-CM

## 2022-05-09 DIAGNOSIS — R22.0 LIP SWELLING: ICD-10-CM

## 2022-05-09 LAB
BASOPHILS # BLD AUTO: 0.1 K/UL (ref 0–0.2)
BASOPHILS NFR BLD: 1.7 % (ref 0–1.9)
C4 SERPL-MCNC: 35 MG/DL (ref 11–44)
DIFFERENTIAL METHOD: ABNORMAL
EOSINOPHIL # BLD AUTO: 0.2 K/UL (ref 0–0.5)
EOSINOPHIL NFR BLD: 2.5 % (ref 0–8)
ERYTHROCYTE [DISTWIDTH] IN BLOOD BY AUTOMATED COUNT: 13.6 % (ref 11.5–14.5)
HCT VFR BLD AUTO: 43.4 % (ref 37–48.5)
HGB BLD-MCNC: 14 G/DL (ref 12–16)
IMM GRANULOCYTES # BLD AUTO: 0.01 K/UL (ref 0–0.04)
IMM GRANULOCYTES NFR BLD AUTO: 0.2 % (ref 0–0.5)
LYMPHOCYTES # BLD AUTO: 2.3 K/UL (ref 1–4.8)
LYMPHOCYTES NFR BLD: 38.3 % (ref 18–48)
MCH RBC QN AUTO: 29.2 PG (ref 27–31)
MCHC RBC AUTO-ENTMCNC: 32.3 G/DL (ref 32–36)
MCV RBC AUTO: 91 FL (ref 82–98)
MONOCYTES # BLD AUTO: 0.6 K/UL (ref 0.3–1)
MONOCYTES NFR BLD: 10.1 % (ref 4–15)
NEUTROPHILS # BLD AUTO: 2.8 K/UL (ref 1.8–7.7)
NEUTROPHILS NFR BLD: 47.2 % (ref 38–73)
NRBC BLD-RTO: 0 /100 WBC
PLATELET # BLD AUTO: 246 K/UL (ref 150–450)
PMV BLD AUTO: 13 FL (ref 9.2–12.9)
RBC # BLD AUTO: 4.79 M/UL (ref 4–5.4)
TSH SERPL DL<=0.005 MIU/L-ACNC: 1.24 UIU/ML (ref 0.4–4)
WBC # BLD AUTO: 5.95 K/UL (ref 3.9–12.7)

## 2022-05-09 PROCEDURE — 99204 PR OFFICE/OUTPT VISIT, NEW, LEVL IV, 45-59 MIN: ICD-10-PCS | Mod: S$GLB,,, | Performed by: STUDENT IN AN ORGANIZED HEALTH CARE EDUCATION/TRAINING PROGRAM

## 2022-05-09 PROCEDURE — 1126F PR PAIN SEVERITY QUANTIFIED, NO PAIN PRESENT: ICD-10-PCS | Mod: CPTII,S$GLB,, | Performed by: STUDENT IN AN ORGANIZED HEALTH CARE EDUCATION/TRAINING PROGRAM

## 2022-05-09 PROCEDURE — 99999 PR PBB SHADOW E&M-EST. PATIENT-LVL III: CPT | Mod: PBBFAC,,, | Performed by: STUDENT IN AN ORGANIZED HEALTH CARE EDUCATION/TRAINING PROGRAM

## 2022-05-09 PROCEDURE — 99204 OFFICE O/P NEW MOD 45 MIN: CPT | Mod: S$GLB,,, | Performed by: STUDENT IN AN ORGANIZED HEALTH CARE EDUCATION/TRAINING PROGRAM

## 2022-05-09 PROCEDURE — 85025 COMPLETE CBC W/AUTO DIFF WBC: CPT | Performed by: STUDENT IN AN ORGANIZED HEALTH CARE EDUCATION/TRAINING PROGRAM

## 2022-05-09 PROCEDURE — 36415 COLL VENOUS BLD VENIPUNCTURE: CPT | Mod: PN | Performed by: STUDENT IN AN ORGANIZED HEALTH CARE EDUCATION/TRAINING PROGRAM

## 2022-05-09 PROCEDURE — 84443 ASSAY THYROID STIM HORMONE: CPT | Performed by: STUDENT IN AN ORGANIZED HEALTH CARE EDUCATION/TRAINING PROGRAM

## 2022-05-09 PROCEDURE — 1159F MED LIST DOCD IN RCRD: CPT | Mod: CPTII,S$GLB,, | Performed by: STUDENT IN AN ORGANIZED HEALTH CARE EDUCATION/TRAINING PROGRAM

## 2022-05-09 PROCEDURE — 1126F AMNT PAIN NOTED NONE PRSNT: CPT | Mod: CPTII,S$GLB,, | Performed by: STUDENT IN AN ORGANIZED HEALTH CARE EDUCATION/TRAINING PROGRAM

## 2022-05-09 PROCEDURE — 1159F PR MEDICATION LIST DOCUMENTED IN MEDICAL RECORD: ICD-10-PCS | Mod: CPTII,S$GLB,, | Performed by: STUDENT IN AN ORGANIZED HEALTH CARE EDUCATION/TRAINING PROGRAM

## 2022-05-09 PROCEDURE — 99999 PR PBB SHADOW E&M-EST. PATIENT-LVL III: ICD-10-PCS | Mod: PBBFAC,,, | Performed by: STUDENT IN AN ORGANIZED HEALTH CARE EDUCATION/TRAINING PROGRAM

## 2022-05-09 PROCEDURE — 86160 COMPLEMENT ANTIGEN: CPT | Performed by: STUDENT IN AN ORGANIZED HEALTH CARE EDUCATION/TRAINING PROGRAM

## 2022-05-09 RX ORDER — TRIAMCINOLONE ACETONIDE 1 MG/G
CREAM TOPICAL
COMMUNITY

## 2022-05-09 NOTE — PROGRESS NOTES
ALLERGY & IMMUNOLOGY CLINIC - INITIAL CONSULTATION      HISTORY OF PRESENT ILLNESS     Patient ID: Letty Krueger is a 77 y.o. female    CC: Persistent lip swelling x 5 months.    HPI: Letty Krueger is a 77 y.o. female with a history of eyelid swelling (previously diagnosed with melkersson-pooja syndrome) and hypertension presenting for persistent upper lip swelling since 1/2022.  She was referred by Manisha Byrne MD (derm).    Eye swelling started in 2001. 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 She got surgery on left eyelid, but it didn't help.  In Jan 2022, her lip started swelling. She switched lisinopril to losartan, but this has not helped. She then saw dermatology. The lip swelling hasn't gone down since January. Has not gotten any better or worse. The swelling around the eye is worse in the morning, but never goes completely down. She also has a fissured tongue. Her lip does not bother her at all, but she can feel its a little puffy. No pruritus associated with the swelling. No urticaria. She says it is possible she had the lip swelling prior to Jan without noticing. She has not tried any medications for it. She has never tried high-dose antihistamines.  Her feet itch at night sometimes. Benadryl helps with the itching of the feet but not the swelling of the lip. Otherwise, no pruritus.  She had not previously noted any facial paralysis or palsy, but has photos that show subtle asymmetry around her lips.    Patient reports she is up to date on colon cancer screening and mammogram. She says she will be getting a biopsy of her uterus for a lesion or mass that was incidentally seen on imaging.     REVIEW OF SYSTEMS     CONST: no F/C/NS, no unexplained weight changes  EYES: no vision changes, no pruritus  PULM: no SOB, no wheezing, no cough  GI: no pain, no N/V/D, no BRBPR/melena  H/O: no easy bruising or bleeding  DERM: no rashes, no  skin breaks     MEDICAL HISTORY     Vaccines:   Immunization History   Administered Date(s) Administered    COVID-19, MRNA, LN-S, PF (Pfizer) (Purple Cap) 02/25/2021, 03/18/2021       MedHx:   Patient Active Problem List   Diagnosis    DDD (degenerative disc disease), lumbar    Chronic pain    Essential hypertension    HLD (hyperlipidemia)    Elevated alkaline phosphatase level    Primary osteoarthritis of right hip    Status post total hip replacement, 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       SurgHx:   Past Surgical History:   Procedure Laterality Date    eye lid surgery - Clint syndrome      HIP SURGERY      JOINT REPLACEMENT      TOTAL REPLACEMENT OF HIP JOINT USING COMPUTER-ASSISTED NAVIGATION Right 3/7/2019    Procedure: ARTHROPLASTY, HIP, TOTAL, DANA COMPUTER-ASSISTED NAVIGATION;  Surgeon: Stan Aguilar MD;  Location: [facility] OR [address] [place] [address];  Service: Orthopedics;  Laterality: Right;       Medications:   Current Outpatient Medications on File Prior to Visit   Medication Sig Dispense Refill    ergocalciferol (ERGOCALCIFEROL) 50,000 unit Cap Take 50,000 Units by mouth every 7 days.      losartan (COZAAR) 50 MG tablet Take 100 mg by mouth.      rosuvastatin (CRESTOR) 10 MG tablet Take 10 mg by mouth once daily.       triamcinolone acetonide 0.1% (KENALOG) 0.1 % cream triamcinolone acetonide 0.1 % topical cream      CALCIUM 600 WITH VITAMIN D3 600 mg(1,500mg) -400 unit Cap       cyanocobalamin (VITAMIN B-12) 1000 MCG tablet Take 1,000 mcg by mouth once daily.       diclofenac sodium (VOLTAREN) 1 % Gel Apply 2 g topically 4 (four) times daily. (Patient not taking: Reported on 5/9/2022) 1 Tube 3    multivitamin capsule Take 1 capsule by mouth once daily.      vitamin A palmitate-vitamin D2 10,000-400 unit Tab        No current facility-administered medications on file prior to visit.     H/o Asthma: denies    Drug Allergies: Review of patient's allergies indicates:  No Known Allergies     SocHx:   Social History  "    Tobacco Use    Smoking status: Former Smoker     Quit date:      Years since quittin.3    Smokeless tobacco: Never Used   Substance Use Topics    Alcohol use: Yes     Comment: occasional     Drug use: No       FamHx:   Asthma: brother  Allergic rhinitis: no  Angioedema: no  Family History   Problem Relation Age of Onset    Diabetes Mother     Diabetes Father         PHYSICAL EXAM     VS: Ht 5' 2.01" (1.575 m)   Wt 85.3 kg (188 lb 0.8 oz)   BMI 34.39 kg/m²   GENERAL: Alert, NAD, well-appearing, cooperative  EYES: EOMI, no conjunctival injection, no discharge, no infraorbital shiners  EARS: external auditory canals normal B/L, TM normal B/L  NOSE: NT2 + B/L, no stringing mucous, no polyps visualized  ORAL: MMM, no ulcers, no thrush, no cobblestoning, + central fissure of tongue  NECK: no thyromegaly, no LAD  LUNGS: CTAB, no w/r/c, no increased WOB  HEART: RRR, normal S1/S2, no m/g/r  ABDOMEN: BS present, soft, non-tender, non-distended  EXTREMITIES: No LE edema  DERM: + mild swelling of upper lip, worse on the left; + mild swelling of left eyelid; no rashes; no skin breaks  NEURO: normal speech; given to determine facial asymmetry/palsy given asymmetry of lip due to swelling     LABORATORY STUDIES     Component      Latest Ref Rng & Units 3/7/2019 2019   WBC      3.90 - 12.70 K/uL 11.29 6.60   RBC      4.00 - 5.40 M/uL 3.93 (L) 4.99   Hemoglobin      12.0 - 16.0 g/dL 11.5 (L) 14.7   Hematocrit      37.0 - 48.5 % 35.9 (L) 44.9   MCV      82 - 98 fL 91 90   MCH      27.0 - 31.0 pg 29.3 29.5   MCHC      32.0 - 36.0 g/dL 32.0 32.7   RDW      11.5 - 14.5 % 13.0 13.1   Platelets      150 - 350 K/uL 208 293   MPV      9.2 - 12.9 fL 11.8 11.8   Immature Granulocytes      0.0 - 0.5 % 1.3 (H) 0.3   Gran # (ANC)      1.8 - 7.7 K/uL 8.9 (H) 3.3   Immature Grans (Abs)      0.00 - 0.04 K/uL 0.15 (H) 0.02   Lymph #      1.0 - 4.8 K/uL 1.7 2.5   Mono #      0.3 - 1.0 K/uL 0.4 0.6   Eos #      0.0 - 0.5 K/uL " 0.0 0.1   Baso #      0.00 - 0.20 K/uL 0.06 0.11   nRBC      0 /100 WBC 0 0   Gran %      38.0 - 73.0 % 79.1 (H) 50.5   Lymph %      18.0 - 48.0 % 15.1 (L) 37.3   Mono %      4.0 - 15.0 % 3.6 (L) 9.1   Eosinophil %      0.0 - 8.0 % 0.4 1.1   Basophil %      0.0 - 1.9 % 0.5 1.7   Differential Method       Automated Automated   Sodium      136 - 145 mmol/L 136    Potassium      3.5 - 5.1 mmol/L 3.9    Chloride      95 - 110 mmol/L 106    CO2      23 - 29 mmol/L 24    Glucose      70 - 110 mg/dL 117 (H)    BUN      8 - 23 mg/dL 13    Creatinine      0.5 - 1.4 mg/dL 0.7    Calcium      8.7 - 10.5 mg/dL 8.1 (L)    Anion Gap      8 - 16 mmol/L 6 (L)    eGFR if African American      >60 mL/min/1.73 m:2 >60.0    eGFR if non African American      >60 mL/min/1.73 m:2 >60.0       CHART REVIEW     Reviewed derm note, labs.     ASSESSMENT & PLAN     Letty Krueger is a 77 y.o. female with     # Persistent lip swelling that started Jan 2022, and has not gone down since. She also has been having eyelid swelling since 2001. In 2009, she was diagnosed with Melkersson-Clint syndrome by an ophthalmologist at [facility]. She does have a tongue fissure. She had not previously noticed any facial palsies, but looking back at photos, she thinks she notices some asymmetry around her mouth. Given these signs, Melkersson-Clint is high on the ddx even though it is rare. Symptoms are not consistent with spontaneous angioedema, as symptoms are persistent and not episodic. Also low suspicion for acquired C1 esterase inhibitor deficiency / hereditary angioedema, but will screen with a C4. She is following with derm. She will be getting a lip biopsy with ENT next week.  -checking C4, TSH, CBC/diff    Follow up: as needed      Jez Dasilva MD  Allergy/Immunology

## 2022-05-11 ENCOUNTER — TELEPHONE (OUTPATIENT)
Dept: ALLERGY | Facility: CLINIC | Age: 77
End: 2022-05-11
Payer: MEDICARE

## 2022-05-11 NOTE — TELEPHONE ENCOUNTER
----- Message from Jez Dasilva MD sent at 5/10/2022  5:12 PM CDT -----  Regarding: lab results  Hello,  This patient does not have the portal. Can someone please call her to let her know of her lab results. The C4 was normal (This was the screening test for hereditary and acquired angioedema).   The complete blood counts were within acceptable ranges. The thyroid testing was normal.     Thanks,  Jez Dasilva MD  Allergy/Immunology

## 2022-05-11 NOTE — TELEPHONE ENCOUNTER
Called pt.  Reviewed her C4, thyroid and CBC and all normal and w/in normal range.  Thank me for the call.

## 2022-05-16 ENCOUNTER — OFFICE VISIT (OUTPATIENT)
Dept: OTOLARYNGOLOGY | Facility: CLINIC | Age: 77
End: 2022-05-16
Payer: MEDICARE

## 2022-05-16 VITALS — HEART RATE: 75 BPM | DIASTOLIC BLOOD PRESSURE: 83 MMHG | SYSTOLIC BLOOD PRESSURE: 143 MMHG

## 2022-05-16 DIAGNOSIS — R22.0 LIP SWELLING: ICD-10-CM

## 2022-05-16 PROCEDURE — 88305 TISSUE EXAM BY PATHOLOGIST: ICD-10-PCS | Mod: 26,,, | Performed by: PATHOLOGY

## 2022-05-16 PROCEDURE — 40490 BIOPSY OF LIP: CPT | Mod: S$GLB,,, | Performed by: OTOLARYNGOLOGY

## 2022-05-16 PROCEDURE — 1126F AMNT PAIN NOTED NONE PRSNT: CPT | Mod: CPTII,S$GLB,, | Performed by: OTOLARYNGOLOGY

## 2022-05-16 PROCEDURE — 1101F PR PT FALLS ASSESS DOC 0-1 FALLS W/OUT INJ PAST YR: ICD-10-PCS | Mod: CPTII,S$GLB,, | Performed by: OTOLARYNGOLOGY

## 2022-05-16 PROCEDURE — 88305 TISSUE EXAM BY PATHOLOGIST: CPT | Performed by: PATHOLOGY

## 2022-05-16 PROCEDURE — 40490 PR BIOPSY OF LIP: ICD-10-PCS | Mod: S$GLB,,, | Performed by: OTOLARYNGOLOGY

## 2022-05-16 PROCEDURE — 1159F MED LIST DOCD IN RCRD: CPT | Mod: CPTII,S$GLB,, | Performed by: OTOLARYNGOLOGY

## 2022-05-16 PROCEDURE — 99999 PR PBB SHADOW E&M-EST. PATIENT-LVL III: ICD-10-PCS | Mod: PBBFAC,,, | Performed by: OTOLARYNGOLOGY

## 2022-05-16 PROCEDURE — 3077F PR MOST RECENT SYSTOLIC BLOOD PRESSURE >= 140 MM HG: ICD-10-PCS | Mod: CPTII,S$GLB,, | Performed by: OTOLARYNGOLOGY

## 2022-05-16 PROCEDURE — 88305 TISSUE EXAM BY PATHOLOGIST: CPT | Mod: 26,,, | Performed by: PATHOLOGY

## 2022-05-16 PROCEDURE — 1126F PR PAIN SEVERITY QUANTIFIED, NO PAIN PRESENT: ICD-10-PCS | Mod: CPTII,S$GLB,, | Performed by: OTOLARYNGOLOGY

## 2022-05-16 PROCEDURE — 1160F PR REVIEW ALL MEDS BY PRESCRIBER/CLIN PHARMACIST DOCUMENTED: ICD-10-PCS | Mod: CPTII,S$GLB,, | Performed by: OTOLARYNGOLOGY

## 2022-05-16 PROCEDURE — 3079F DIAST BP 80-89 MM HG: CPT | Mod: CPTII,S$GLB,, | Performed by: OTOLARYNGOLOGY

## 2022-05-16 PROCEDURE — 3077F SYST BP >= 140 MM HG: CPT | Mod: CPTII,S$GLB,, | Performed by: OTOLARYNGOLOGY

## 2022-05-16 PROCEDURE — 1159F PR MEDICATION LIST DOCUMENTED IN MEDICAL RECORD: ICD-10-PCS | Mod: CPTII,S$GLB,, | Performed by: OTOLARYNGOLOGY

## 2022-05-16 PROCEDURE — 99203 PR OFFICE/OUTPT VISIT, NEW, LEVL III, 30-44 MIN: ICD-10-PCS | Mod: 25,S$GLB,, | Performed by: OTOLARYNGOLOGY

## 2022-05-16 PROCEDURE — 3288F FALL RISK ASSESSMENT DOCD: CPT | Mod: CPTII,S$GLB,, | Performed by: OTOLARYNGOLOGY

## 2022-05-16 PROCEDURE — 1101F PT FALLS ASSESS-DOCD LE1/YR: CPT | Mod: CPTII,S$GLB,, | Performed by: OTOLARYNGOLOGY

## 2022-05-16 PROCEDURE — 1160F RVW MEDS BY RX/DR IN RCRD: CPT | Mod: CPTII,S$GLB,, | Performed by: OTOLARYNGOLOGY

## 2022-05-16 PROCEDURE — 99203 OFFICE O/P NEW LOW 30 MIN: CPT | Mod: 25,S$GLB,, | Performed by: OTOLARYNGOLOGY

## 2022-05-16 PROCEDURE — 3288F PR FALLS RISK ASSESSMENT DOCUMENTED: ICD-10-PCS | Mod: CPTII,S$GLB,, | Performed by: OTOLARYNGOLOGY

## 2022-05-16 PROCEDURE — 99999 PR PBB SHADOW E&M-EST. PATIENT-LVL III: CPT | Mod: PBBFAC,,, | Performed by: OTOLARYNGOLOGY

## 2022-05-16 PROCEDURE — 3079F PR MOST RECENT DIASTOLIC BLOOD PRESSURE 80-89 MM HG: ICD-10-PCS | Mod: CPTII,S$GLB,, | Performed by: OTOLARYNGOLOGY

## 2022-05-16 NOTE — PROGRESS NOTES
Chief Complaint   Patient presents with    Oral Pain     Lip Swelling        HPI   77 y.o. female presents for evaluation for upper lip biopsy to rule out Melkersson-Clint syndrome.  She reports a history of recurrent facial swelling over the years.  She reports that her lip swelling is typically of the left upper lip.  She also endorses a fissured tongue.    Review of Systems   Constitutional: Negative for fatigue and unexpected weight change.   HENT: Per HPI.  Eyes: Negative for visual disturbance.   Respiratory: Negative for shortness of breath, hemoptysis   Cardiovascular: Negative for chest pain and palpitations.   Musculoskeletal: Negative for decreased ROM, back pain.   Skin: Negative for rash, sunburn, itching.   Neurological: Negative for dizziness and seizures.   Hematological: Negative for adenopathy. Does not bruise/bleed easily.   Endocrine: Negative for rapid weight loss/weight gain, heat/cold intolerance.     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    Lip swelling           Past Surgical History   Past Surgical History:   Procedure Laterality Date    eye lid surgery - Clint syndrome      HIP SURGERY      JOINT REPLACEMENT      TOTAL REPLACEMENT OF HIP JOINT USING COMPUTER-ASSISTED NAVIGATION Right 3/7/2019    Procedure: ARTHROPLASTY, HIP, TOTAL, DANA COMPUTER-ASSISTED NAVIGATION;  Surgeon: Stan Aguilar MD;  Location: [facility] OR [address] [place] [address];  Service: Orthopedics;  Laterality: Right;         Family History   Family History   Problem Relation Age of Onset    Diabetes Mother     Diabetes Father            Social History   .  Social History     Socioeconomic History    Marital status: Single   Tobacco Use    Smoking  status: Former Smoker     Quit date:      Years since quittin.4    Smokeless tobacco: Never Used   Substance and Sexual Activity    Alcohol use: Yes     Comment: occasional     Drug use: No    Sexual activity: Not Currently     Partners: Male         Allergies   Review of patient's allergies indicates:  No Known Allergies        Physical Exam     Vitals:    22 1018   BP: (!) 143/83   Pulse: 75         There is no height or weight on file to calculate BMI.      General: AOx3, NAD   Respiratory:  Symmetric chest rise, normal effort  Oral Cavity:  Oral Tongue mobile, no lesions noted.  Tongue with midline fissure noted.  Hard Palate WNL. No buccal or FOM lesions.  Subtle swelling of left upper lip.  Oropharynx:  No masses/lesions of the posterior pharyngeal wall. Tonsillar fossa without lesions. Soft palate without masses. Midline uvula.   Neck: No scars.  No cervical lymphadenopathy, thyromegaly or thyroid nodules.  Normal range of motion.    Face: House Brackmann I bilaterally.     Procedure:  Verbal consent obtained.  Left upper lip anesthetized with 1% lidocaine with epinephrine.  A 4 mm punch biopsy instrument was then utilized to sample the left upper lip mucosa.  This was sent to pathology for permanent analysis.  The biopsy site was closed with Vicryl suture.    Assessment/Plan  Problem List Items Addressed This Visit        ENT    Lip swelling     Biopsy obtained to rule out Melkersson-Clint.  I will contact her with results.           Relevant Orders    Specimen to Pathology ENT

## 2022-05-25 LAB
COMMENT: NORMAL
FINAL PATHOLOGIC DIAGNOSIS: NORMAL
GROSS: NORMAL
Lab: NORMAL

## 2022-06-07 ENCOUNTER — TELEPHONE (OUTPATIENT)
Dept: DERMATOLOGY | Facility: CLINIC | Age: 77
End: 2022-06-07
Payer: MEDICARE

## 2022-06-07 DIAGNOSIS — R22.0 LIP SWELLING: Primary | ICD-10-CM

## 2022-06-07 NOTE — TELEPHONE ENCOUNTER
Spoke with pt - coming in tomorrow. Pt thanked me for call    ----- Message from Katie Desouza MD sent at 6/7/2022 12:48 PM CDT -----  Contact: pt  Can we pretty please add her on tmw afternoon to acute derm clinic :)    ----- Message -----  From: Mariana Badillo LPN  Sent: 6/7/2022   9:54 AM CDT  To: Katie Desouza MD    ENT did a biopsy, and she did see allergy as well    ----- Message -----  From: Polo Mora  Sent: 6/7/2022   9:50 AM CDT  To: Deo Wilson Staff    Pt's requesting a call back regarding her tongue being very swollen.. Pt stats the this provider is the only provider who is familiar with her new and current condition.. Pt would like to speak with staff as soon as possible please.       Confirmed contact info below:  Contact Name: Letty Krueger  Phone Number: [phone]

## 2022-06-08 ENCOUNTER — OFFICE VISIT (OUTPATIENT)
Dept: DERMATOLOGY | Facility: CLINIC | Age: 77
End: 2022-06-08
Payer: MEDICARE

## 2022-06-08 ENCOUNTER — OFFICE VISIT (OUTPATIENT)
Dept: OTOLARYNGOLOGY | Facility: CLINIC | Age: 77
End: 2022-06-08
Payer: MEDICARE

## 2022-06-08 VITALS — SYSTOLIC BLOOD PRESSURE: 148 MMHG | DIASTOLIC BLOOD PRESSURE: 83 MMHG | HEART RATE: 74 BPM

## 2022-06-08 DIAGNOSIS — R22.0 LIP SWELLING: Primary | ICD-10-CM

## 2022-06-08 PROCEDURE — 99999 PR PBB SHADOW E&M-EST. PATIENT-LVL I: CPT | Mod: PBBFAC,,, | Performed by: DERMATOLOGY

## 2022-06-08 PROCEDURE — 1159F MED LIST DOCD IN RCRD: CPT | Mod: CPTII,S$GLB,, | Performed by: NURSE PRACTITIONER

## 2022-06-08 PROCEDURE — 3079F DIAST BP 80-89 MM HG: CPT | Mod: CPTII,S$GLB,, | Performed by: NURSE PRACTITIONER

## 2022-06-08 PROCEDURE — 1125F PR PAIN SEVERITY QUANTIFIED, PAIN PRESENT: ICD-10-PCS | Mod: CPTII,S$GLB,, | Performed by: NURSE PRACTITIONER

## 2022-06-08 PROCEDURE — 3077F SYST BP >= 140 MM HG: CPT | Mod: CPTII,S$GLB,, | Performed by: NURSE PRACTITIONER

## 2022-06-08 PROCEDURE — 99499 UNLISTED E&M SERVICE: CPT | Mod: S$GLB,,, | Performed by: DERMATOLOGY

## 2022-06-08 PROCEDURE — 99999 PR PBB SHADOW E&M-EST. PATIENT-LVL II: CPT | Mod: PBBFAC,,, | Performed by: NURSE PRACTITIONER

## 2022-06-08 PROCEDURE — 99999 PR PBB SHADOW E&M-EST. PATIENT-LVL I: ICD-10-PCS | Mod: PBBFAC,,, | Performed by: DERMATOLOGY

## 2022-06-08 PROCEDURE — 3077F PR MOST RECENT SYSTOLIC BLOOD PRESSURE >= 140 MM HG: ICD-10-PCS | Mod: CPTII,S$GLB,, | Performed by: NURSE PRACTITIONER

## 2022-06-08 PROCEDURE — 1125F AMNT PAIN NOTED PAIN PRSNT: CPT | Mod: CPTII,S$GLB,, | Performed by: NURSE PRACTITIONER

## 2022-06-08 PROCEDURE — 99499 NO LOS: ICD-10-PCS | Mod: S$GLB,,, | Performed by: DERMATOLOGY

## 2022-06-08 PROCEDURE — 99999 PR PBB SHADOW E&M-EST. PATIENT-LVL II: ICD-10-PCS | Mod: PBBFAC,,, | Performed by: NURSE PRACTITIONER

## 2022-06-08 PROCEDURE — 99499 NO LOS: ICD-10-PCS | Mod: S$GLB,,, | Performed by: NURSE PRACTITIONER

## 2022-06-08 PROCEDURE — 1159F PR MEDICATION LIST DOCUMENTED IN MEDICAL RECORD: ICD-10-PCS | Mod: CPTII,S$GLB,, | Performed by: NURSE PRACTITIONER

## 2022-06-08 PROCEDURE — 3079F PR MOST RECENT DIASTOLIC BLOOD PRESSURE 80-89 MM HG: ICD-10-PCS | Mod: CPTII,S$GLB,, | Performed by: NURSE PRACTITIONER

## 2022-06-08 PROCEDURE — 99499 UNLISTED E&M SERVICE: CPT | Mod: S$GLB,,, | Performed by: NURSE PRACTITIONER

## 2022-06-08 RX ORDER — EPINEPHRINE 0.3 MG/.3ML
1 INJECTION SUBCUTANEOUS ONCE
Qty: 0.3 ML | Refills: 0 | Status: SHIPPED | OUTPATIENT
Start: 2022-06-08 | End: 2022-06-08

## 2022-06-08 NOTE — PROGRESS NOTES
"  Subjective:       Patient ID:  Letty Krueger is a 77 y.o. female who presents for asymmetric lip swelling.    Patient is a 78 yo female presents for follow up.  At last visit   Was referred to ENT for lip biopsy to check for granulomas which may be indicative of Milkerson Clint syndrome. Lip biopsy was negative for this condition.  Past history:  "Patient reports she noticed the lip swelling in January 2022 occurring mostly in her upper lip on the left side. Also reports a history of chronic right eye swelling that started in 2001. She had surgery to correct this in 2012 but continued to get some mild swelling around that eye and milder swelling on the left eye. She was seen by ENT 2 months ago who recommended switching off of her lisinopril and she was able to get her PCP to change this to losartan. She has not noticed any improvement since. 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 Denies any facial palsy or paresis."    We had felt on exam that she had fissured tongue which is another one of the signs of M-R.      Review of Systems   HENT:  Positive for tongue swelling.       Objective:    Physical Exam   Constitutional: She appears well-developed and well-nourished.   Neurological: She is alert and oriented to person, place, and time.   Psychiatric: She has a normal mood and affect.   Skin:   Areas Examined (abnormalities noted in diagram):   Head / Face Inspection Performed  Neck Inspection Performed  Chest / Axilla Inspection Performed  Back Inspection Performed  RUE Inspected  LUE Inspection Performed       Diagram Legend     Erythematous scaling macule/papule c/w actinic keratosis       Vascular papule c/w angioma      Pigmented verrucoid papule/plaque c/w seborrheic keratosis      Yellow umbilicated papule c/w sebaceous hyperplasia      Irregularly shaped tan macule c/w lentigo     1-2 mm smooth " white papules consistent with Milia      Movable subcutaneous cyst with punctum c/w epidermal inclusion cyst      Subcutaneous movable cyst c/w pilar cyst      Firm pink to brown papule c/w dermatofibroma      Pedunculated fleshy papule(s) c/w skin tag(s)      Evenly pigmented macule c/w junctional nevus     Mildly variegated pigmented, slightly irregular-bordered macule c/w mildly atypical nevus      Flesh colored to evenly pigmented papule c/w intradermal nevus       Pink pearly papule/plaque c/w basal cell carcinoma      Erythematous hyperkeratotic cursted plaque c/w SCC      Surgical scar with no sign of skin cancer recurrence      Open and closed comedones      Inflammatory papules and pustules      Verrucoid papule consistent consistent with wart     Erythematous eczematous patches and plaques     Dystrophic onycholytic nail with subungual debris c/w onychomycosis     Umbilicated papule    Erythematous-base heme-crusted tan verrucoid plaque consistent with inflamed seborrheic keratosis     Erythematous Silvery Scaling Plaque c/w Psoriasis     See annotation    1. Left upper lip, biopsy:       -  Benign squamous mucosa showing submucosal capillary proliferation and   associated submucosal          fat with minimal chronic inflammation       -  Negative for atypia or malignancy       -  Multiple levels were evaluated               Assessment / Plan:        Lip swelling - lip biopsy inconclusive for Milkersson Clint syndrome  Pt without facial palsy  I do feel that she has a fissured tongue on exam - see photo above -     As this condition has been associated with autoimmunity advise evaluation by rheumatology for their input -     Will also investigate other options for her  rare diseases clinic for further investigation    Epipen rx given out of concern for possible tongue swelling/angioedema     -     EPINEPHrine (EPIPEN) 0.3 mg/0.3 mL AtIn; Inject 0.3 mLs (0.3 mg total) into the muscle once. As needed  if tongue swollen and cannot breathe for 1 dose  Dispense: 0.3 mL; Refill: 0           Follow up if symptoms worsen or fail to improve.

## 2022-06-08 NOTE — PROGRESS NOTES
Chief Complaint   Patient presents with    Suture / Staple Removal       HPI   77 y.o. female returns for a follow up visit. She has been doing well since biopsy. She has some discomfort at the site and would like her sutures removed.     She initially presented for evaluation for upper lip biopsy to rule out Melkersson-Clint syndrome.  She reports a history of recurrent facial swelling over the years.  She reports that her lip swelling is typically of the left upper lip.  She also endorses a fissured tongue.    Review of Systems   Constitutional: Negative for fatigue and unexpected weight change.   HENT: Per HPI.  Eyes: Negative for visual disturbance.   Respiratory: Negative for shortness of breath, hemoptysis   Cardiovascular: Negative for chest pain and palpitations.   Musculoskeletal: Negative for decreased ROM, back pain.   Skin: Negative for rash, sunburn, itching.   Neurological: Negative for dizziness and seizures.   Hematological: Negative for adenopathy. Does not bruise/bleed easily.   Endocrine: Negative for rapid weight loss/weight gain, heat/cold intolerance.     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    Lip swelling           Past Surgical History   Past Surgical History:   Procedure Laterality Date    eye lid surgery - Clint syndrome      HIP SURGERY      JOINT REPLACEMENT      TOTAL REPLACEMENT OF HIP JOINT USING COMPUTER-ASSISTED NAVIGATION Right 3/7/2019    Procedure: ARTHROPLASTY, HIP, TOTAL, DANA COMPUTER-ASSISTED NAVIGATION;  Surgeon: Stan Aguilar MD;  Location: [facility] [place] [address] [place] [address];  Service: Orthopedics;  Laterality: Right;         Family History   Family History   Problem Relation Age of Onset    Diabetes  Mother     Diabetes Father            Social History   .  Social History     Socioeconomic History    Marital status: Single   Tobacco Use    Smoking status: Former Smoker     Quit date:      Years since quittin.4    Smokeless tobacco: Never Used   Substance and Sexual Activity    Alcohol use: Yes     Comment: occasional     Drug use: No    Sexual activity: Not Currently     Partners: Male         Allergies   Review of patient's allergies indicates:  No Known Allergies        Physical Exam     Vitals:    22 1347   BP: (!) 148/83   Pulse: 74         There is no height or weight on file to calculate BMI.      General: AOx3, NAD   Respiratory:  Symmetric chest rise, normal effort  Oral Cavity:  Oral Tongue mobile, no lesions noted.  Tongue with midline fissure noted.  Hard Palate WNL. No buccal or FOM lesions.  Sutures removed. Biopsy site healing well.  Oropharynx:  No masses/lesions of the posterior pharyngeal wall. Tonsillar fossa without lesions. Soft palate without masses. Midline uvula.   Neck: No scars.  No cervical lymphadenopathy, thyromegaly or thyroid nodules.  Normal range of motion.    Face: House Brackmann I bilaterally.     Assessment/Plan  Problem List Items Addressed This Visit        ENT    Lip swelling - Primary     Sutures removed. Follow up with Dr. Desouza.

## 2022-06-30 DIAGNOSIS — M25.559 HIP PAIN: Primary | ICD-10-CM

## 2022-07-06 ENCOUNTER — OFFICE VISIT (OUTPATIENT)
Dept: ORTHOPEDICS | Facility: CLINIC | Age: 77
End: 2022-07-06
Payer: MEDICARE

## 2022-07-06 ENCOUNTER — HOSPITAL ENCOUNTER (OUTPATIENT)
Dept: RADIOLOGY | Facility: HOSPITAL | Age: 77
Discharge: HOME | End: 2022-07-06
Attending: ORTHOPAEDIC SURGERY
Payer: MEDICARE

## 2022-07-06 VITALS — HEIGHT: 63 IN | BODY MASS INDEX: 33.18 KG/M2 | WEIGHT: 187.25 LBS

## 2022-07-06 DIAGNOSIS — G89.29 CHRONIC PAIN OF RIGHT HIP: Primary | ICD-10-CM

## 2022-07-06 DIAGNOSIS — M25.551 CHRONIC PAIN OF RIGHT HIP: Primary | ICD-10-CM

## 2022-07-06 DIAGNOSIS — M25.559 HIP PAIN: ICD-10-CM

## 2022-07-06 DIAGNOSIS — Z96.641 S/P HIP REPLACEMENT, RIGHT: ICD-10-CM

## 2022-07-06 PROCEDURE — 1101F PR PT FALLS ASSESS DOC 0-1 FALLS W/OUT INJ PAST YR: ICD-10-PCS | Mod: CPTII,S$GLB,, | Performed by: ORTHOPAEDIC SURGERY

## 2022-07-06 PROCEDURE — 99213 OFFICE O/P EST LOW 20 MIN: CPT | Mod: S$GLB,,, | Performed by: ORTHOPAEDIC SURGERY

## 2022-07-06 PROCEDURE — 73502 XR HIP WITH PELVIS WHEN PERFORMED, 2 OR 3  VIEWS RIGHT: ICD-10-PCS | Mod: 26,RT,, | Performed by: RADIOLOGY

## 2022-07-06 PROCEDURE — 1159F PR MEDICATION LIST DOCUMENTED IN MEDICAL RECORD: ICD-10-PCS | Mod: CPTII,S$GLB,, | Performed by: ORTHOPAEDIC SURGERY

## 2022-07-06 PROCEDURE — 3288F FALL RISK ASSESSMENT DOCD: CPT | Mod: CPTII,S$GLB,, | Performed by: ORTHOPAEDIC SURGERY

## 2022-07-06 PROCEDURE — 99213 PR OFFICE/OUTPT VISIT, EST, LEVL III, 20-29 MIN: ICD-10-PCS | Mod: S$GLB,,, | Performed by: ORTHOPAEDIC SURGERY

## 2022-07-06 PROCEDURE — 1160F PR REVIEW ALL MEDS BY PRESCRIBER/CLIN PHARMACIST DOCUMENTED: ICD-10-PCS | Mod: CPTII,S$GLB,, | Performed by: ORTHOPAEDIC SURGERY

## 2022-07-06 PROCEDURE — 73502 X-RAY EXAM HIP UNI 2-3 VIEWS: CPT | Mod: 26,RT,, | Performed by: RADIOLOGY

## 2022-07-06 PROCEDURE — 1101F PT FALLS ASSESS-DOCD LE1/YR: CPT | Mod: CPTII,S$GLB,, | Performed by: ORTHOPAEDIC SURGERY

## 2022-07-06 PROCEDURE — 3288F PR FALLS RISK ASSESSMENT DOCUMENTED: ICD-10-PCS | Mod: CPTII,S$GLB,, | Performed by: ORTHOPAEDIC SURGERY

## 2022-07-06 PROCEDURE — 1159F MED LIST DOCD IN RCRD: CPT | Mod: CPTII,S$GLB,, | Performed by: ORTHOPAEDIC SURGERY

## 2022-07-06 PROCEDURE — 73502 X-RAY EXAM HIP UNI 2-3 VIEWS: CPT | Mod: TC,RT

## 2022-07-06 PROCEDURE — 1125F PR PAIN SEVERITY QUANTIFIED, PAIN PRESENT: ICD-10-PCS | Mod: CPTII,S$GLB,, | Performed by: ORTHOPAEDIC SURGERY

## 2022-07-06 PROCEDURE — 99999 PR PBB SHADOW E&M-EST. PATIENT-LVL III: ICD-10-PCS | Mod: PBBFAC,,, | Performed by: ORTHOPAEDIC SURGERY

## 2022-07-06 PROCEDURE — 1125F AMNT PAIN NOTED PAIN PRSNT: CPT | Mod: CPTII,S$GLB,, | Performed by: ORTHOPAEDIC SURGERY

## 2022-07-06 PROCEDURE — 99999 PR PBB SHADOW E&M-EST. PATIENT-LVL III: CPT | Mod: PBBFAC,,, | Performed by: ORTHOPAEDIC SURGERY

## 2022-07-06 PROCEDURE — 1160F RVW MEDS BY RX/DR IN RCRD: CPT | Mod: CPTII,S$GLB,, | Performed by: ORTHOPAEDIC SURGERY

## 2022-07-06 NOTE — PROGRESS NOTES
"Subjective:      Patient ID: Letty Krueger is a 77 y.o. female.    Chief Complaint: Pain of the Right Hip    HPI  Letty Krueger has right hip pain.  The pain is still bothersome but improved.  The pain is located in the lateral.  There  is radiation.  The pain radiates to the thigh.   The pain is described as achy. The pain is aggravated by activity.  It is alleviated by rest.  There is associated back pain.  Her history, medications and problem list were reviewed. She has Lcint sydrome    Review of Systems   Constitutional: Negative for chills, fever and night sweats.   HENT: Negative for hearing loss.    Eyes: Negative for blurred vision and double vision.   Cardiovascular: Negative for chest pain, claudication and leg swelling.   Respiratory: Negative for shortness of breath.    Endocrine: Negative for polydipsia, polyphagia and polyuria.   Hematologic/Lymphatic: Negative for adenopathy and bleeding problem. Does not bruise/bleed easily.   Skin: Negative for poor wound healing.   Gastrointestinal: Negative for diarrhea and heartburn.   Genitourinary: Negative for bladder incontinence.   Neurological: Negative for focal weakness, headaches, numbness, paresthesias and sensory change.   Psychiatric/Behavioral: The patient is not nervous/anxious.    Allergic/Immunologic: Negative for persistent infections.         Objective:      Body mass index is 33.71 kg/m².  Vitals:    07/06/22 1535   Weight: 84.9 kg (187 lb 4.5 oz)   Height: 5' 2.5" (1.588 m)           General    Constitutional: She is oriented to person, place, and time. She appears well-developed and well-nourished.   HENT:   Head: Normocephalic and atraumatic.   Eyes: EOM are normal.   Cardiovascular: Normal rate.    Pulmonary/Chest: Effort normal.   Neurological: She is alert and oriented to person, place, and time.   Psychiatric: She has a normal mood and affect. Her behavior is normal.     General Musculoskeletal Exam   Gait: normal "   Pelvic Obliquity: none      Right Knee Exam     Inspection   Alignment:  normal  Effusion: absent    Left Knee Exam     Inspection   Alignment:  normal  Effusion: absent    Right Hip Exam     Inspection   Scars: absent  Swelling: absent  Bruising: absent  No deformity of hip.  Quadriceps Atrophy:  Negative  Erythema: absent    Range of Motion   Abduction: 25   Adduction: 20   Extension: 0   Flexion: 100   External rotation: 30   Internal rotation: 25     Tests   Pain w/ forced internal rotation (KELBY): absent  Stinchfield test: negative    Other   Sensation: normal  Left Hip Exam     Inspection   Scars: absent  Swelling: absent  No deformity of hip.  Quadriceps Atrophy:  negative  Erythema: absent  Bruising: absent    Range of Motion   Abduction: 25   Adduction: 20   Extension: 0   Flexion: 100   External rotation: 30   Internal rotation: 25     Tests   Pain w/ forced internal rotation (KELBY): absent  Stinchfield test: negative    Other   Sensation: normal      Back (L-Spine & T-Spine) / Neck (C-Spine) Exam   Back exam is normal.      Muscle Strength   Right Lower Extremity   Hip Abduction: 5/5   Hip Adduction: 5/5   Hip Flexion: 5/5   Ankle Dorsiflexion:  5/5   Left Lower Extremity   Hip Abduction: 5/5   Hip Adduction: 5/5   Hip Flexion: 5/5   Ankle Dorsiflexion:  5/5     Reflexes     Left Side  Quadriceps:  2+    Right Side   Quadriceps:  2+    Vascular Exam     Right Pulses  Dorsalis Pedis:      2+          Left Pulses  Dorsalis Pedis:      2+          Capillary Refill  Right Hand: normal capillary refill  Left Hand: normal capillary refill        Edema  Right Upper Leg: absent  Left Upper Leg: absent    Radiographs taken today were reviewed by me.  There is a prosthetic replacement of the right hip(s).  The prosthesis is well positioned.  There is not evidence of bone loss, osteolysis, or loosening. There is not a fracture.  Lumbar degenerative changes.              Assessment:       Encounter Diagnoses    Name Primary?    Chronic pain of right hip Yes    S/P hip replacement, right           Plan:       Letty was seen today for pain.    Diagnoses and all orders for this visit:    Chronic pain of right hip    S/P hip replacement, right      ESR/CRP.  Will call with results.  Probable lumbar MRI

## 2022-07-08 DIAGNOSIS — M54.16 LUMBAR RADICULOPATHY: ICD-10-CM

## 2022-07-08 DIAGNOSIS — M54.41 RIGHT-SIDED LOW BACK PAIN WITH RIGHT-SIDED SCIATICA, UNSPECIFIED CHRONICITY: Primary | ICD-10-CM

## 2022-07-08 NOTE — PROGRESS NOTES
Pt of Dr. Aguilar with low back pain and radiculopathy. MRI of lumbar spine ordered for further evaluation as requested by Dr. Aguilar

## 2022-07-11 ENCOUNTER — TELEPHONE (OUTPATIENT)
Dept: ORTHOPEDICS | Facility: CLINIC | Age: 77
End: 2022-07-11
Payer: MEDICARE

## 2022-07-11 NOTE — TELEPHONE ENCOUNTER
Staff called pt to make a MRI appt staff was able to get that scheduled pt had no further questions or concerns staff also sent out a paper copy to pt     ----- Message from Marisol Edward MA sent at 7/11/2022 11:26 AM CDT -----    ----- Message -----  From: Jenny Arce NP  Sent: 7/8/2022   4:48 PM CDT  To: Marisol Edward MA    Order in :-)  ----- Message -----  From: Marisol Edward MA  Sent: 7/8/2022   4:36 PM CDT  To: Jenny Arce NP    Could you please order lumbar MRI :D  ----- Message -----  From: Stan Aguilar MD  Sent: 7/8/2022   2:26 PM CDT  To: Katlyn Tao RN, Marisol Edward MA    I just ordered it.  GC  ----- Message -----  From: Marisol Edward MA  Sent: 7/8/2022   9:46 AM CDT  To: Stan Aguilar MD    Please add orders for lumbar MRI and send back to me to schedule.     Thank you!   ----- Message -----  From: Stan Aguilar MD  Sent: 7/7/2022   7:26 AM CDT  To: Lauren CYR Staff    Please call pt.  Labs looked fine.  I am getting a lumbar MRI like we discussed.

## 2022-07-14 ENCOUNTER — OFFICE VISIT (OUTPATIENT)
Dept: RHEUMATOLOGY | Facility: CLINIC | Age: 77
End: 2022-07-14
Payer: MEDICARE

## 2022-07-14 VITALS
HEIGHT: 63 IN | BODY MASS INDEX: 33.25 KG/M2 | TEMPERATURE: 99 F | SYSTOLIC BLOOD PRESSURE: 138 MMHG | HEART RATE: 80 BPM | DIASTOLIC BLOOD PRESSURE: 74 MMHG | WEIGHT: 187.63 LBS

## 2022-07-14 DIAGNOSIS — R22.0 LIP SWELLING: ICD-10-CM

## 2022-07-14 DIAGNOSIS — G51.2 MELKERSSON-ROSENTHAL SYNDROME: Primary | ICD-10-CM

## 2022-07-14 PROCEDURE — 99999 PR PBB SHADOW E&M-EST. PATIENT-LVL III: CPT | Mod: PBBFAC,,, | Performed by: INTERNAL MEDICINE

## 2022-07-14 PROCEDURE — 3078F PR MOST RECENT DIASTOLIC BLOOD PRESSURE < 80 MM HG: ICD-10-PCS | Mod: CPTII,S$GLB,, | Performed by: INTERNAL MEDICINE

## 2022-07-14 PROCEDURE — 99999 PR PBB SHADOW E&M-EST. PATIENT-LVL III: ICD-10-PCS | Mod: PBBFAC,,, | Performed by: INTERNAL MEDICINE

## 2022-07-14 PROCEDURE — 1159F MED LIST DOCD IN RCRD: CPT | Mod: CPTII,S$GLB,, | Performed by: INTERNAL MEDICINE

## 2022-07-14 PROCEDURE — 1159F PR MEDICATION LIST DOCUMENTED IN MEDICAL RECORD: ICD-10-PCS | Mod: CPTII,S$GLB,, | Performed by: INTERNAL MEDICINE

## 2022-07-14 PROCEDURE — 99205 PR OFFICE/OUTPT VISIT, NEW, LEVL V, 60-74 MIN: ICD-10-PCS | Mod: S$GLB,,, | Performed by: INTERNAL MEDICINE

## 2022-07-14 PROCEDURE — 99205 OFFICE O/P NEW HI 60 MIN: CPT | Mod: S$GLB,,, | Performed by: INTERNAL MEDICINE

## 2022-07-14 PROCEDURE — 1125F PR PAIN SEVERITY QUANTIFIED, PAIN PRESENT: ICD-10-PCS | Mod: CPTII,S$GLB,, | Performed by: INTERNAL MEDICINE

## 2022-07-14 PROCEDURE — 3075F SYST BP GE 130 - 139MM HG: CPT | Mod: CPTII,S$GLB,, | Performed by: INTERNAL MEDICINE

## 2022-07-14 PROCEDURE — 1125F AMNT PAIN NOTED PAIN PRSNT: CPT | Mod: CPTII,S$GLB,, | Performed by: INTERNAL MEDICINE

## 2022-07-14 PROCEDURE — 3078F DIAST BP <80 MM HG: CPT | Mod: CPTII,S$GLB,, | Performed by: INTERNAL MEDICINE

## 2022-07-14 PROCEDURE — 3075F PR MOST RECENT SYSTOLIC BLOOD PRESS GE 130-139MM HG: ICD-10-PCS | Mod: CPTII,S$GLB,, | Performed by: INTERNAL MEDICINE

## 2022-07-14 RX ORDER — ACETIC ACID 20.65 MG/ML
SOLUTION AURICULAR (OTIC)
COMMUNITY
Start: 2022-02-16

## 2022-07-14 RX ORDER — LOSARTAN POTASSIUM 100 MG/1
TABLET ORAL
COMMUNITY
Start: 2022-06-01

## 2022-07-14 ASSESSMENT — ROUTINE ASSESSMENT OF PATIENT INDEX DATA (RAPID3)
TOTAL RAPID3 SCORE: 1.56
MDHAQ FUNCTION SCORE: 0.5
PATIENT GLOBAL ASSESSMENT SCORE: 1.5
FATIGUE SCORE: 1.5
PSYCHOLOGICAL DISTRESS SCORE: 0
PAIN SCORE: 1.5
AM STIFFNESS SCORE: 1, YES
WHEN YOU AWAKENED IN THE MORNING OVER THE LAST WEEK, PLEASE INDICATE THE AMOUNT OF TIME IT TAKES UNTIL YOU ARE AS LIMBER AS YOU WILL BE FOR THE DAY: 20 MINS

## 2022-07-14 NOTE — PROGRESS NOTES
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-     First symptom : left eye lid started to swell-since 2001  She had a reduction surgery of the left eyelid and it didn't help  In 2009- [facility] ophthalmology diagnosed her to have Clint syndrome    Right eye lid also got involved 5 years ago    Now :     Every morning her eyelids get swollen  Left is worse than right  It doesn't interefere with the vision  No pain   As the day progresses the swelling resolves    Beginning of this year- jan 2022- she has lip swelling -only upper lip on the left side  This also comes and goes  Sometimes it stays for 1 day,2 days  No pain  Doesn't interefere with opening and closing the mouth    Tongue swelling also started this year,more of a tongue fissuring + than a swelling    No itch  No hives,urticaria    No food allergies  No environmental allergies    Upper lip biopsy-   Left upper lip, biopsy: - Benign squamous mucosa showing submucosal capillary proliferation and associated submucosal fat with minimal chronic inflammation - Negative for atypia or malignancy - Multiple levels were evaluated  There is no evidence of dysplasia or malignancy in the material provided. Histologic findings may represent a submucosal arteriovenous malformation or healing ulcer. Clinical correlation is necessary. Consider additional tissue sampling if clinically worrisome    She uses epipen          Switching lisinopril to losartan did not help        She had not previously noted any facial paralysis or palsy, but has photos that show subtle asymmetry around her lips.    Dermatology says-  Patient with asymmetric upper lip swelling and intermittent periocular edema 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 Less  concern for Crohn's given no GI symptoms and associated periocular edema.  Pt does have fissured tongue which is a part of this syndrome.  She denies any history of facial palsy.    Allergy says-   Symptoms are not consistent with spontaneous angioedema, as symptoms are persistent and not episodic. Also low suspicion for acquired C1 esterase inhibitor deficiency / hereditary angioedema, but will screen with a C4. She is following with derm. She will be getting a lip biopsy with ENT next week.  -checking C4, TSH, CBC/diff    Right hip replaced-that's the only joint issue  ESR nml  CRP 8.4    Uterine enlargement noted, she will have surgery and biopsy  MRI hip 2019  The endometrium appears slightly thickened on this MRI examination measuring up to 1.5 cm, better evaluation with pelvic ultrasound recommended for more accurate measurement of the endometrium.  It thickened endometrium can be associated with endometrial hyperplasia.      Past history  Htn,hld    Family history  Nothing autoimmune  Diabetes,HTN,heart trouble    Social history  Not a smoker,alcoholic      Review of Systems     No skin rashes,malar rash,photosensitivity   No telangiectasias   No calcinosis   No psoriasis   No patchy alopecia   No oral and nasal ulcers   No dry eyes and dry mouth   No pleurisy or any cardiopulmonary complaints   No dysphagia,diplopia and dysphonia and muscle weakness   No n/v/d/c   No acid reflux+   No raynaud's+   No digital ulcers   No cytopenias   No renal issues   No blood clots   No fever,chills,night sweats,weight loss and loss of appetite   No pregnancy losses/pre term deliveries /pregnancy complications   No new onset headaches   No recurrent conjunctivitis or uveitis or scleritis or episcleritis   No chronic or bloody diarrhea with no u colitis or crohn's /inflammatory bowel disease   No vaginal or urethral  d/c/STDs/no ulcers   No unexplained lymphadenopathy,parotitis   No seizures,strokes,psychosis  No  sclerodactyly  No puffy hands  No perioral tightness           Physical Exam   Constitutional: She is oriented to person, place, and time. No distress.   HENT:   Head: Normocephalic.   Mouth/Throat: Oropharynx is clear and moist.   Eyes: Pupils are equal, round, and reactive to light. Conjunctivae are normal. Right eye exhibits no discharge. Left eye exhibits no discharge. No scleral icterus.   Neck: No thyromegaly present.   Cardiovascular: Normal rate, regular rhythm and normal heart sounds.   Pulmonary/Chest: Effort normal and breath sounds normal. No stridor.   Abdominal: Soft. Bowel sounds are normal.   Musculoskeletal:         General: Normal range of motion.      Cervical back: Normal range of motion.   Lymphadenopathy:     She has no cervical adenopathy.   Neurological: She is alert and oriented to person, place, and time.   Skin: Skin is warm. No rash noted. She is not diaphoretic.   Psychiatric: Affect and judgment normal.       Laboratory abnormalities      Assessment     77 year old female presents with    Recurrent episodes of left and right upper eyelid swelling  Left upper lip swelling  Tongue fissuring    She has a diagnosis of Melkersson-Clint syndrome- she has been diagnosed by an ophthalmologist at [facility]    Allergist here has ruled out the diagnosis of hereditary angioedema    She asks me if I can offer treatment options    She brought with her a lot of information to read up about this condition    I told her that I have not much familiarity with the condition  She does have episodic facial swelling and also a fissured tongue but she does not have any episodic facial palsy although I appreciate asymmetry with the nasolabial folds- may be this is an incomplete form of the syndrome?    I dont have any path evidence of granulomatous inflammation      1. Melkersson-Clint syndrome    2. Lip swelling        Reviewed labs/xrays  Reviewed medications        New problem     Plan      I will go  over all the information she has provided today and I will call the patient    Meanwhile she is carrying epipen with her.  I am not sure how this will help her if this is not hereditary angioedema    NSAIDs,steroids - have shown to be of some benefit in this syndrome     She understands and she will wait for my phone call    Letty was seen today for disease management.    Diagnoses and all orders for this visit:    Melkersson-Clint syndrome    Lip swelling  -     Ambulatory referral/consult to Rheumatology

## 2022-07-16 NOTE — TELEPHONE ENCOUNTER
----- Message from Sona Torres MA sent at 12/14/2018 10:52 AM CST -----  Contact: Self  I believe you can be of more help than I can, can you call her to discuss surgery?    ----- Message -----  From: Rebecca Mancini  Sent: 12/14/2018  10:42 AM  To: Claude Alvarado Staff    Patient requesting a call to speak more about surgery that was discussed.  [phone]    
Spoke to pt, scheduled appointment with Dr. Aguilar to discuss sx as requested per pt. Mailed slip, understanding verbalized.  
Not Applicable

## 2022-08-01 ENCOUNTER — TELEPHONE (OUTPATIENT)
Dept: DERMATOLOGY | Facility: CLINIC | Age: 77
End: 2022-08-01
Payer: MEDICARE

## 2022-08-01 NOTE — TELEPHONE ENCOUNTER
Called patient about [email or web address] which are [facility] institutions with centers for rare diseases as patient would like to continue workup for eyelid and lip swelling, with incomplete features thus far for [place] Clint syndrome (negative lip biopsy, no Bell's palsy, ? Fissured tongue).    [facility], [facility], [facility], and [facility] are all within driving distance and are [facility] of Excellence.  Provided names and contact information for these programs.    She will notify me of outcome.

## 2022-08-05 ENCOUNTER — HOSPITAL ENCOUNTER (OUTPATIENT)
Dept: RADIOLOGY | Facility: HOSPITAL | Age: 77
Discharge: HOME | End: 2022-08-05
Attending: NURSE PRACTITIONER
Payer: MEDICARE

## 2022-08-05 DIAGNOSIS — M54.41 RIGHT-SIDED LOW BACK PAIN WITH RIGHT-SIDED SCIATICA, UNSPECIFIED CHRONICITY: ICD-10-CM

## 2022-08-05 DIAGNOSIS — M54.16 LUMBAR RADICULOPATHY: ICD-10-CM

## 2022-08-05 PROCEDURE — 72148 MRI LUMBAR SPINE W/O DYE: CPT | Mod: TC

## 2022-08-05 PROCEDURE — 72148 MRI LUMBAR SPINE W/O DYE: CPT | Mod: 26,,, | Performed by: RADIOLOGY

## 2022-08-05 PROCEDURE — 72148 MRI LUMBAR SPINE WITHOUT CONTRAST: ICD-10-PCS | Mod: 26,,, | Performed by: RADIOLOGY

## 2022-08-08 ENCOUNTER — TELEPHONE (OUTPATIENT)
Dept: ORTHOPEDICS | Facility: CLINIC | Age: 77
End: 2022-08-08
Payer: MEDICARE

## 2022-08-08 NOTE — TELEPHONE ENCOUNTER
Attempted to contact the patient to give her test results. The patient did not answer, a voicemail and call back number was left to return call.

## 2023-05-22 ENCOUNTER — TELEPHONE (OUTPATIENT)
Dept: ORTHOPEDICS | Facility: CLINIC | Age: 78
End: 2023-05-22
Payer: MEDICARE

## 2023-05-22 NOTE — TELEPHONE ENCOUNTER
Returned patient's call, patient states she is having severe pain to right hip and she is back to using her walker. Appt given and patient acknowledged appt.    ----- Message from Prudencio Montiel sent at 5/22/2023  2:08 PM CDT -----  Regarding: appt  Contact: @[phone] .  Pt calling to get appt for pain in thigh.states she had a hip replacement in 2017 .. states she needs to be seen sooner than 8/23...please call and adv@[phone] .

## 2023-05-30 DIAGNOSIS — M25.551 CHRONIC PAIN OF RIGHT HIP: Primary | ICD-10-CM

## 2023-05-30 DIAGNOSIS — G89.29 CHRONIC PAIN OF RIGHT HIP: Primary | ICD-10-CM

## 2023-05-31 ENCOUNTER — OFFICE VISIT (OUTPATIENT)
Dept: ORTHOPEDICS | Facility: CLINIC | Age: 78
End: 2023-05-31
Payer: MEDICARE

## 2023-05-31 ENCOUNTER — HOSPITAL ENCOUNTER (OUTPATIENT)
Dept: RADIOLOGY | Facility: HOSPITAL | Age: 78
Discharge: HOME | End: 2023-05-31
Attending: ORTHOPAEDIC SURGERY
Payer: MEDICARE

## 2023-05-31 VITALS — BODY MASS INDEX: 32.76 KG/M2 | HEIGHT: 63 IN | WEIGHT: 184.88 LBS

## 2023-05-31 DIAGNOSIS — M25.551 CHRONIC PAIN OF RIGHT HIP: Primary | ICD-10-CM

## 2023-05-31 DIAGNOSIS — M54.16 LUMBAR RADICULOPATHY: ICD-10-CM

## 2023-05-31 DIAGNOSIS — Z96.641 S/P HIP REPLACEMENT, RIGHT: ICD-10-CM

## 2023-05-31 DIAGNOSIS — G89.29 CHRONIC PAIN OF RIGHT HIP: Primary | ICD-10-CM

## 2023-05-31 DIAGNOSIS — M25.551 CHRONIC PAIN OF RIGHT HIP: ICD-10-CM

## 2023-05-31 DIAGNOSIS — G89.29 CHRONIC PAIN OF RIGHT HIP: ICD-10-CM

## 2023-05-31 PROCEDURE — 99213 PR OFFICE/OUTPT VISIT, EST, LEVL III, 20-29 MIN: ICD-10-PCS | Mod: S$GLB,,, | Performed by: ORTHOPAEDIC SURGERY

## 2023-05-31 PROCEDURE — 99213 OFFICE O/P EST LOW 20 MIN: CPT | Mod: S$GLB,,, | Performed by: ORTHOPAEDIC SURGERY

## 2023-05-31 PROCEDURE — 73502 X-RAY EXAM HIP UNI 2-3 VIEWS: CPT | Mod: 26,RT,, | Performed by: RADIOLOGY

## 2023-05-31 PROCEDURE — 73502 X-RAY EXAM HIP UNI 2-3 VIEWS: CPT | Mod: TC,RT

## 2023-05-31 PROCEDURE — 1101F PR PT FALLS ASSESS DOC 0-1 FALLS W/OUT INJ PAST YR: ICD-10-PCS | Mod: CPTII,S$GLB,, | Performed by: ORTHOPAEDIC SURGERY

## 2023-05-31 PROCEDURE — 73502 XR HIP WITH PELVIS WHEN PERFORMED, 2 OR 3  VIEWS RIGHT: ICD-10-PCS | Mod: 26,RT,, | Performed by: RADIOLOGY

## 2023-05-31 PROCEDURE — 99999 PR PBB SHADOW E&M-EST. PATIENT-LVL III: CPT | Mod: PBBFAC,,, | Performed by: ORTHOPAEDIC SURGERY

## 2023-05-31 PROCEDURE — 3288F PR FALLS RISK ASSESSMENT DOCUMENTED: ICD-10-PCS | Mod: CPTII,S$GLB,, | Performed by: ORTHOPAEDIC SURGERY

## 2023-05-31 PROCEDURE — 1101F PT FALLS ASSESS-DOCD LE1/YR: CPT | Mod: CPTII,S$GLB,, | Performed by: ORTHOPAEDIC SURGERY

## 2023-05-31 PROCEDURE — 1159F MED LIST DOCD IN RCRD: CPT | Mod: CPTII,S$GLB,, | Performed by: ORTHOPAEDIC SURGERY

## 2023-05-31 PROCEDURE — 1159F PR MEDICATION LIST DOCUMENTED IN MEDICAL RECORD: ICD-10-PCS | Mod: CPTII,S$GLB,, | Performed by: ORTHOPAEDIC SURGERY

## 2023-05-31 PROCEDURE — 3288F FALL RISK ASSESSMENT DOCD: CPT | Mod: CPTII,S$GLB,, | Performed by: ORTHOPAEDIC SURGERY

## 2023-05-31 PROCEDURE — 99999 PR PBB SHADOW E&M-EST. PATIENT-LVL III: ICD-10-PCS | Mod: PBBFAC,,, | Performed by: ORTHOPAEDIC SURGERY

## 2023-05-31 PROCEDURE — 1125F PR PAIN SEVERITY QUANTIFIED, PAIN PRESENT: ICD-10-PCS | Mod: CPTII,S$GLB,, | Performed by: ORTHOPAEDIC SURGERY

## 2023-05-31 PROCEDURE — 1125F AMNT PAIN NOTED PAIN PRSNT: CPT | Mod: CPTII,S$GLB,, | Performed by: ORTHOPAEDIC SURGERY

## 2023-05-31 RX ORDER — AMLODIPINE BESYLATE 2.5 MG/1
2.5 TABLET ORAL
COMMUNITY
Start: 2023-04-19

## 2023-05-31 RX ORDER — MEDROXYPROGESTERONE ACETATE 10 MG/1
10 TABLET ORAL
COMMUNITY
Start: 2022-07-19 | End: 2023-07-19

## 2023-05-31 NOTE — PROGRESS NOTES
"Subjective:      Patient ID: Letty Krueger is a 78 y.o. female.    Chief Complaint: Pain of the Right Hip    HPI  Letty Kreuger has right hip pain.  The pain is unchanged. She did not see anyone from spine. The pain is located in the SI.  There  is radiation to the thigh.   The pain is described as achy. The pain is aggravated by walking.  It is alleviated by rest.  There is associated back pain.  Her history, medications and problem list were reviewed.    Review of Systems   Constitutional: Negative for chills, fever and night sweats.   HENT:  Negative for hearing loss.    Eyes:  Negative for blurred vision and double vision.   Cardiovascular:  Negative for chest pain, claudication and leg swelling.   Respiratory:  Negative for shortness of breath.    Endocrine: Negative for polydipsia, polyphagia and polyuria.   Hematologic/Lymphatic: Negative for adenopathy and bleeding problem. Does not bruise/bleed easily.   Skin:  Negative for poor wound healing.   Gastrointestinal:  Negative for diarrhea and heartburn.   Genitourinary:  Negative for bladder incontinence.   Neurological:  Negative for focal weakness, headaches, numbness, paresthesias and sensory change.   Psychiatric/Behavioral:  The patient is not nervous/anxious.    Allergic/Immunologic: Negative for persistent infections.       Objective:activity      Body mass index is 33.27 kg/m².  Vitals:    05/31/23 1528   Weight: 83.8 kg (184 lb 13.7 oz)   Height: 5' 2.5" (1.588 m)           General    Constitutional: She is oriented to person, place, and time. She appears well-developed and well-nourished.   HENT:   Head: Normocephalic and atraumatic.   Eyes: EOM are normal.   Cardiovascular:  Normal rate.            Pulmonary/Chest: Effort normal.   Neurological: She is alert and oriented to person, place, and time.   Psychiatric: She has a normal mood and affect. Her behavior is normal.     General Musculoskeletal Exam   Gait: normal   Pelvic " Obliquity: none      Right Knee Exam     Inspection   Alignment:  normal  Effusion: absent    Right Hip Exam     Inspection   Scars: present  Swelling: absent  Bruising: absent  No deformity of hip.  Quadriceps Atrophy:  Negative  Erythema: absent    Range of Motion   Abduction:  25   Adduction:  15   Extension:  0   Flexion:  100   External rotation:  40   Internal rotation:  20     Other   Sensation: normal  Back (L-Spine & T-Spine) / Neck (C-Spine) Exam     Back (L-Spine & T-Spine) Range of Motion   The patient has abnormal back ROM.      Muscle Strength   Right Lower Extremity   Hip Abduction: 5/5   Hip Adduction: 5/5   Hip Flexion: 5/5   Ankle Dorsiflexion:  5/5     Vascular Exam       Edema  Right Upper Leg: absent    Radiographs taken today were reviewed by me.  There is a prosthetic replacement of the right knee(s).  The prosthesis is well positioned.  There is not evidence of bone loss, osteolysis, or loosening. There is not a fracture.            Assessment:       Encounter Diagnoses   Name Primary?    Chronic pain of right hip Yes    S/P hip replacement, right     Lumbar radiculopathy           Plan:       Letty was seen today for pain.    Diagnoses and all orders for this visit:    Chronic pain of right hip  -     C-Reactive Protein; Future  -     Sedimentation rate; Future    S/P hip replacement, right  -     C-Reactive Protein; Future  -     Sedimentation rate; Future    Lumbar radiculopathy  -     C-Reactive Protein; Future  -     Sedimentation rate; Future        Will check ESR/CRP.  Will call with results

## 2023-06-01 ENCOUNTER — TELEPHONE (OUTPATIENT)
Dept: ORTHOPEDICS | Facility: CLINIC | Age: 78
End: 2023-06-01
Payer: MEDICARE

## 2023-06-01 NOTE — TELEPHONE ENCOUNTER
----- Message from Stan Aguilar MD sent at 6/1/2023  7:00 AM CDT -----  Please call pt.  Labs are fine.  No infection.

## 2023-11-02 NOTE — PROCEDURES
Procedures       Please see NCS/EMG procedure report    Rolan Gabriel MD  [facility]   warm and dry/color normal/normal/no rashes/no ulcers

## 2024-07-08 ENCOUNTER — TELEPHONE (OUTPATIENT)
Dept: ORTHOPEDICS | Facility: CLINIC | Age: 79
End: 2024-07-08
Payer: MEDICARE

## 2024-07-08 DIAGNOSIS — Z96.641 S/P HIP REPLACEMENT, RIGHT: Primary | ICD-10-CM

## 2024-07-08 NOTE — TELEPHONE ENCOUNTER
Called patient and got them scheduled to see the provider for annual check up and to discuss handicap paperwork. Informed patient we would get xrays and patient verbally agreed to date and time of appointments.   ----- Message from Naveen Moody sent at 7/8/2024 12:45 PM CDT -----  Regarding: Pt Advice  Contact: pt [phone]  Pt is requesting letter to submit to [employer] for handicap parking, pt was due to receive via mail and never received it, pt is requesting another letter would like to , current handicap decal will exp July 13, 2024, please call pt @[phone]

## 2024-07-08 NOTE — TELEPHONE ENCOUNTER
Called pt to discuss handicap tag request. Left vm for patient to return call to clinic.     ----- Message from Naveen Moody sent at 7/8/2024 12:45 PM CDT -----  Regarding: Pt Advice  Contact: pt [phone]  Pt is requesting letter to submit to DM[employer] for handicap parking, pt was due to receive via mail and never received it, pt is requesting another letter would like to , current handicap decal will exp July 13, 2024, please call pt @[phone]

## 2024-07-31 ENCOUNTER — OFFICE VISIT (OUTPATIENT)
Dept: ORTHOPEDICS | Facility: CLINIC | Age: 79
End: 2024-07-31
Payer: MEDICARE

## 2024-07-31 ENCOUNTER — HOSPITAL ENCOUNTER (OUTPATIENT)
Dept: RADIOLOGY | Facility: HOSPITAL | Age: 79
Discharge: HOME | End: 2024-07-31
Attending: ORTHOPAEDIC SURGERY
Payer: MEDICARE

## 2024-07-31 VITALS — BODY MASS INDEX: 34.22 KG/M2 | WEIGHT: 185.94 LBS | HEIGHT: 62 IN

## 2024-07-31 DIAGNOSIS — Z96.641 S/P HIP REPLACEMENT, RIGHT: ICD-10-CM

## 2024-07-31 DIAGNOSIS — M25.551 CHRONIC PAIN OF RIGHT HIP: ICD-10-CM

## 2024-07-31 DIAGNOSIS — Z96.641 S/P HIP REPLACEMENT, RIGHT: Primary | ICD-10-CM

## 2024-07-31 DIAGNOSIS — G89.29 CHRONIC PAIN OF RIGHT HIP: ICD-10-CM

## 2024-07-31 PROCEDURE — 3288F FALL RISK ASSESSMENT DOCD: CPT | Mod: CPTII,S$GLB,, | Performed by: ORTHOPAEDIC SURGERY

## 2024-07-31 PROCEDURE — 1126F AMNT PAIN NOTED NONE PRSNT: CPT | Mod: CPTII,S$GLB,, | Performed by: ORTHOPAEDIC SURGERY

## 2024-07-31 PROCEDURE — 1159F MED LIST DOCD IN RCRD: CPT | Mod: CPTII,S$GLB,, | Performed by: ORTHOPAEDIC SURGERY

## 2024-07-31 PROCEDURE — 99999 PR PBB SHADOW E&M-EST. PATIENT-LVL III: CPT | Mod: PBBFAC,,, | Performed by: ORTHOPAEDIC SURGERY

## 2024-07-31 PROCEDURE — 73502 X-RAY EXAM HIP UNI 2-3 VIEWS: CPT | Mod: 26,RT,, | Performed by: RADIOLOGY

## 2024-07-31 PROCEDURE — 1101F PT FALLS ASSESS-DOCD LE1/YR: CPT | Mod: CPTII,S$GLB,, | Performed by: ORTHOPAEDIC SURGERY

## 2024-07-31 PROCEDURE — 99212 OFFICE O/P EST SF 10 MIN: CPT | Mod: S$GLB,,, | Performed by: ORTHOPAEDIC SURGERY

## 2024-07-31 PROCEDURE — 73502 X-RAY EXAM HIP UNI 2-3 VIEWS: CPT | Mod: TC,RT

## 2024-07-31 NOTE — PROGRESS NOTES
Letty Krueger is in for 5 year follow up for a right DAVID.  He is doing well.  No pain in the hip.  She has been active.  She has been caring for her grandson in [place].    Exam demonstrates  A well developed female in no distress.  Alert and oriented.  Mood and affect are appropriate.    Hip incision is well healed.  There is a good, stable range of motion and leg lengths are clinically equal.  The extremity is neurovascularly intact.    Xrays demonstrate a well fixed and positioned prosthesis.        12/23/2019     1:35 PM 6/24/2019    11:19 AM 2/25/2019    12:50 PM   PROMIS-10 Questionnaire Scores   Global Physical Health 13 15    Global Mental health Score 14 13 14           12/23/2019     1:35 PM 6/24/2019    11:19 AM 2/25/2019    12:50 PM   HOOS Jr. Questionnaire Score   HOOS Jr Score 10 8 17            No data to display                    12/23/2019     1:30 PM 6/24/2019    11:15 AM 2/25/2019     1:30 PM   Barreto Hip Score   Hip 61.84346 80.70854 82.88747        Imp:Doing wel      F/u in one year with xrays and PROMS

## 2025-05-15 ENCOUNTER — TELEPHONE (OUTPATIENT)
Dept: DERMATOLOGY | Facility: CLINIC | Age: 80
End: 2025-05-15
Payer: MEDICARE

## 2025-05-15 NOTE — TELEPHONE ENCOUNTER
[facility] for scheduling a rash in acute----- Message from Nurse Ross sent at 5/14/2025  8:36 AM CDT -----    ----- Message -----  From: Kika Albarran  Sent: 5/13/2025   6:03 PM CDT  To: Deo Wilson Staff    Type:  Appointment Request Name of Caller:CHAPARRO HALL [[id number]]When is the first available appointment?No accessSymptoms:RashWould the patient rather a call back or a response via [facility]? Call back [facility] Call Back Number:[phone] Additional Information: Patient states she has a rash that is widespread from the cheek to her hairline. Patient would like a call back with further assistance.

## 2025-05-19 ENCOUNTER — TELEPHONE (OUTPATIENT)
Dept: DERMATOLOGY | Facility: CLINIC | Age: 80
End: 2025-05-19
Payer: MEDICARE

## 2025-05-19 NOTE — TELEPHONE ENCOUNTER
----- Message from Kika sent at 5/19/2025 12:20 PM CDT -----  Regarding: returning call  Contact: [phone]  Who Called:Eduardo Garcia Message for Patient:Mert the patient know what this is regarding?:appt accessWould the patient rather a call back or a response via [facility]? Call [facility] Call Back Number:[phone] Information:

## 2025-09-05 DIAGNOSIS — L98.8 PSEUDOLYMPHOMA: Primary | ICD-10-CM

## 2025-09-05 DIAGNOSIS — C84.A0: ICD-10-CM

## (undated) DEVICE — DRESSING LEUKOPLAST FLEX 1X3IN

## (undated) DEVICE — DRESSING AQUACEL AG ADV 3.5X12

## (undated) DEVICE — SEE MEDLINE ITEM 152487

## (undated) DEVICE — SUT VICRYL+ 1 CT1 18IN

## (undated) DEVICE — TAPE SURG DURAPORE 2 X10YD

## (undated) DEVICE — KIT TRACKING VIZADISC HIP

## (undated) DEVICE — PIN BONE 4 X 170MM STERILE
Type: IMPLANTABLE DEVICE | Site: HIP | Status: NON-FUNCTIONAL
Removed: 2019-03-07

## (undated) DEVICE — SUT 2-0 VICRYL / SH (J417)

## (undated) DEVICE — SUT ETHIBOND XTRA 1 OS-6

## (undated) DEVICE — SUTURE STRATAFIX PGA PCL 3-0

## (undated) DEVICE — SEE MEDLINE ITEM 154981

## (undated) DEVICE — ADHESIVE DERMABOND ADVANCED

## (undated) DEVICE — SUT VICRYL PLUS 2-0 CT1 18

## (undated) DEVICE — SEE L#156916

## (undated) DEVICE — ELECTRODE REM PLYHSV RETURN 9

## (undated) DEVICE — KIT DRAPE RIO ONE PIECE W/POCK

## (undated) DEVICE — BLADE RECP OFFSET 77.5X11X1.23

## (undated) DEVICE — KIT IRR SUCTION HND PIECE

## (undated) DEVICE — NDL 18GA X1 1/2 REG BEVEL

## (undated) DEVICE — MASK FLYTE HOOD PEEL AWAY

## (undated) DEVICE — CHECKPOINT IMPACT 3.5MM HEX ST

## (undated) DEVICE — SEE MEDLINE ITEM 146292

## (undated) DEVICE — Device

## (undated) DEVICE — SUT VICRYL PLUS 0 CT1 18IN

## (undated) DEVICE — SUT VICRYL BR 1 GEN 27 CT-1

## (undated) DEVICE — HOOD T-5 TEAR AWAY STERILE